# Patient Record
Sex: FEMALE | Race: WHITE | NOT HISPANIC OR LATINO | Employment: OTHER | URBAN - METROPOLITAN AREA
[De-identification: names, ages, dates, MRNs, and addresses within clinical notes are randomized per-mention and may not be internally consistent; named-entity substitution may affect disease eponyms.]

---

## 2019-01-01 ENCOUNTER — APPOINTMENT (OUTPATIENT)
Dept: CT IMAGING | Facility: HOSPITAL | Age: 84
End: 2019-01-01

## 2019-01-01 ENCOUNTER — APPOINTMENT (OUTPATIENT)
Dept: GENERAL RADIOLOGY | Facility: HOSPITAL | Age: 84
End: 2019-01-01

## 2019-01-01 ENCOUNTER — HOSPITAL ENCOUNTER (INPATIENT)
Facility: HOSPITAL | Age: 84
LOS: 1 days | End: 2019-11-30
Attending: EMERGENCY MEDICINE | Admitting: INTERNAL MEDICINE

## 2019-01-01 ENCOUNTER — HOSPITAL ENCOUNTER (OUTPATIENT)
Facility: HOSPITAL | Age: 84
Setting detail: OBSERVATION
Discharge: NURSING, REHAB OR LONG-TERM CARE | End: 2019-03-20
Attending: INTERNAL MEDICINE | Admitting: INTERNAL MEDICINE

## 2019-01-01 ENCOUNTER — HOSPITAL ENCOUNTER (INPATIENT)
Facility: HOSPITAL | Age: 84
LOS: 7 days | Discharge: HOME | End: 2019-06-14
Attending: EMERGENCY MEDICINE | Admitting: INTERNAL MEDICINE

## 2019-01-01 ENCOUNTER — HOSPITAL ENCOUNTER (OUTPATIENT)
Dept: CT IMAGING | Facility: HOSPITAL | Age: 84
Discharge: HOME | End: 2019-09-17
Admitting: INTERNAL MEDICINE

## 2019-01-01 ENCOUNTER — APPOINTMENT (OUTPATIENT)
Dept: CARDIOLOGY | Facility: HOSPITAL | Age: 84
End: 2019-01-01

## 2019-01-01 ENCOUNTER — TRANSCRIBE ORDERS (OUTPATIENT)
Dept: ADMINISTRATIVE | Facility: HOSPITAL | Age: 84
End: 2019-01-01

## 2019-01-01 VITALS
HEIGHT: 59 IN | TEMPERATURE: 98.3 F | SYSTOLIC BLOOD PRESSURE: 151 MMHG | BODY MASS INDEX: 20.18 KG/M2 | DIASTOLIC BLOOD PRESSURE: 58 MMHG | OXYGEN SATURATION: 89 % | RESPIRATION RATE: 18 BRPM | HEART RATE: 86 BPM | WEIGHT: 100.09 LBS

## 2019-01-01 VITALS
HEART RATE: 85 BPM | DIASTOLIC BLOOD PRESSURE: 63 MMHG | RESPIRATION RATE: 36 BRPM | HEIGHT: 60 IN | SYSTOLIC BLOOD PRESSURE: 118 MMHG | WEIGHT: 95 LBS | BODY MASS INDEX: 18.65 KG/M2 | OXYGEN SATURATION: 91 % | TEMPERATURE: 97.8 F

## 2019-01-01 VITALS
WEIGHT: 93.6 LBS | SYSTOLIC BLOOD PRESSURE: 133 MMHG | BODY MASS INDEX: 19.65 KG/M2 | HEIGHT: 58 IN | DIASTOLIC BLOOD PRESSURE: 68 MMHG | RESPIRATION RATE: 16 BRPM | HEART RATE: 73 BPM | OXYGEN SATURATION: 95 % | TEMPERATURE: 98.7 F

## 2019-01-01 DIAGNOSIS — R91.8 LUNG MASS: ICD-10-CM

## 2019-01-01 DIAGNOSIS — I50.9 CONGESTIVE HEART FAILURE, UNSPECIFIED HF CHRONICITY, UNSPECIFIED HEART FAILURE TYPE (HCC): Primary | ICD-10-CM

## 2019-01-01 DIAGNOSIS — J18.9 PNEUMONIA OF RIGHT MIDDLE LOBE DUE TO INFECTIOUS ORGANISM: ICD-10-CM

## 2019-01-01 DIAGNOSIS — R56.9 NEW ONSET SEIZURE (HCC): ICD-10-CM

## 2019-01-01 DIAGNOSIS — D64.9 SEVERE ANEMIA: ICD-10-CM

## 2019-01-01 DIAGNOSIS — R13.10 DYSPHAGIA, UNSPECIFIED TYPE: ICD-10-CM

## 2019-01-01 DIAGNOSIS — Z51.5 END OF LIFE CARE: Primary | ICD-10-CM

## 2019-01-01 DIAGNOSIS — J90 PLEURAL EFFUSION ON RIGHT: ICD-10-CM

## 2019-01-01 DIAGNOSIS — R09.02 HYPOXIA: ICD-10-CM

## 2019-01-01 DIAGNOSIS — N17.9 ACUTE RENAL FAILURE, UNSPECIFIED ACUTE RENAL FAILURE TYPE (HCC): ICD-10-CM

## 2019-01-01 DIAGNOSIS — R91.8 LUNG MASS: Primary | ICD-10-CM

## 2019-01-01 DIAGNOSIS — Z74.09 IMPAIRED FUNCTIONAL MOBILITY, BALANCE, GAIT, AND ENDURANCE: Primary | ICD-10-CM

## 2019-01-01 DIAGNOSIS — Z74.09 IMPAIRED FUNCTIONAL MOBILITY, BALANCE, GAIT, AND ENDURANCE: ICD-10-CM

## 2019-01-01 DIAGNOSIS — J90 PLEURAL EFFUSION: ICD-10-CM

## 2019-01-01 DIAGNOSIS — Z78.9 IMPAIRED MOBILITY AND ADLS: ICD-10-CM

## 2019-01-01 DIAGNOSIS — Z74.09 IMPAIRED MOBILITY AND ADLS: ICD-10-CM

## 2019-01-01 LAB
ALBUMIN SERPL-MCNC: 3.3 G/DL (ref 3.5–5.2)
ALBUMIN SERPL-MCNC: 3.5 G/DL (ref 3.5–5.2)
ALBUMIN SERPL-MCNC: 3.7 G/DL (ref 3.5–5.2)
ALBUMIN SERPL-MCNC: 3.7 G/DL (ref 3.5–5.2)
ALBUMIN SERPL-MCNC: 4.2 G/DL (ref 3.5–5.2)
ALBUMIN/GLOB SERPL: 1.8 G/DL
ALBUMIN/GLOB SERPL: 1.8 G/DL
ALBUMIN/GLOB SERPL: 1.9 G/DL
ALBUMIN/GLOB SERPL: 2.1 G/DL
ALBUMIN/GLOB SERPL: 2.5 G/DL
ALP SERPL-CCNC: 102 U/L (ref 39–117)
ALP SERPL-CCNC: 125 U/L (ref 39–117)
ALP SERPL-CCNC: 84 U/L (ref 39–117)
ALP SERPL-CCNC: 89 U/L (ref 39–117)
ALP SERPL-CCNC: 96 U/L (ref 39–117)
ALT SERPL W P-5'-P-CCNC: 20 U/L (ref 1–33)
ALT SERPL W P-5'-P-CCNC: 7 U/L (ref 1–33)
ALT SERPL W P-5'-P-CCNC: 8 U/L (ref 1–33)
ANION GAP SERPL CALCULATED.3IONS-SCNC: 10 MMOL/L
ANION GAP SERPL CALCULATED.3IONS-SCNC: 10.1 MMOL/L
ANION GAP SERPL CALCULATED.3IONS-SCNC: 11 MMOL/L
ANION GAP SERPL CALCULATED.3IONS-SCNC: 12 MMOL/L
ANION GAP SERPL CALCULATED.3IONS-SCNC: 12.3 MMOL/L
ANION GAP SERPL CALCULATED.3IONS-SCNC: 13 MMOL/L
ANION GAP SERPL CALCULATED.3IONS-SCNC: 13 MMOL/L
ANION GAP SERPL CALCULATED.3IONS-SCNC: 24 MMOL/L (ref 5–15)
ANION GAP SERPL CALCULATED.3IONS-SCNC: 7 MMOL/L
ANION GAP SERPL CALCULATED.3IONS-SCNC: 9.3 MMOL/L
APPEARANCE FLD: ABNORMAL
APTT PPP: 29.2 SECONDS (ref 24–37)
ASCENDING AORTA: 2.8 CM
AST SERPL-CCNC: 14 U/L (ref 1–32)
AST SERPL-CCNC: 14 U/L (ref 1–32)
AST SERPL-CCNC: 15 U/L (ref 1–32)
AST SERPL-CCNC: 15 U/L (ref 1–32)
AST SERPL-CCNC: 28 U/L (ref 1–32)
BACTERIA BLD CULT: ABNORMAL
BACTERIA FLD CULT: NORMAL
BACTERIA SPEC AEROBE CULT: ABNORMAL
BACTERIA SPEC AEROBE CULT: NORMAL
BACTERIA SPEC ANAEROBE CULT: NORMAL
BASOPHILS # BLD AUTO: 0.02 10*3/MM3 (ref 0–0.2)
BASOPHILS # BLD AUTO: 0.02 10*3/MM3 (ref 0–0.2)
BASOPHILS # BLD AUTO: 0.04 10*3/MM3 (ref 0–0.2)
BASOPHILS # BLD AUTO: 0.06 10*3/MM3 (ref 0–0.2)
BASOPHILS NFR BLD AUTO: 0.2 % (ref 0–1.5)
BASOPHILS NFR BLD AUTO: 0.2 % (ref 0–1.5)
BASOPHILS NFR BLD AUTO: 0.4 % (ref 0–1.5)
BASOPHILS NFR BLD AUTO: 0.7 % (ref 0–1.5)
BH CV ECHO MEAS - ACS: 1.2 CM
BH CV ECHO MEAS - AI DEC SLOPE: 125 CM/SEC^2
BH CV ECHO MEAS - AI MAX PG: 20.3 MMHG
BH CV ECHO MEAS - AI MAX VEL: 225 CM/SEC
BH CV ECHO MEAS - AI P1/2T: 527.2 MSEC
BH CV ECHO MEAS - AO MAX PG (FULL): 12.7 MMHG
BH CV ECHO MEAS - AO MAX PG: 15.4 MMHG
BH CV ECHO MEAS - AO MEAN PG (FULL): 6 MMHG
BH CV ECHO MEAS - AO MEAN PG: 7 MMHG
BH CV ECHO MEAS - AO ROOT AREA (BSA CORRECTED): 2.1
BH CV ECHO MEAS - AO ROOT AREA: 6.6 CM^2
BH CV ECHO MEAS - AO ROOT DIAM: 2.9 CM
BH CV ECHO MEAS - AO V2 MAX: 196 CM/SEC
BH CV ECHO MEAS - AO V2 MEAN: 123 CM/SEC
BH CV ECHO MEAS - AO V2 VTI: 38.4 CM
BH CV ECHO MEAS - AVA(I,A): 1.2 CM^2
BH CV ECHO MEAS - AVA(I,D): 1.2 CM^2
BH CV ECHO MEAS - AVA(V,A): 1.3 CM^2
BH CV ECHO MEAS - AVA(V,D): 1.3 CM^2
BH CV ECHO MEAS - BSA(HAYCOCK): 1.4 M^2
BH CV ECHO MEAS - BSA: 1.4 M^2
BH CV ECHO MEAS - BZI_BMI: 20.2 KILOGRAMS/M^2
BH CV ECHO MEAS - BZI_METRIC_HEIGHT: 149.9 CM
BH CV ECHO MEAS - BZI_METRIC_WEIGHT: 45.4 KG
BH CV ECHO MEAS - EDV(MOD-SP2): 111 ML
BH CV ECHO MEAS - EDV(MOD-SP4): 99 ML
BH CV ECHO MEAS - EDV(TEICH): 75.5 ML
BH CV ECHO MEAS - EF(CUBED): 49.4 %
BH CV ECHO MEAS - EF(MOD-BP): 32 %
BH CV ECHO MEAS - EF(MOD-SP2): 31.5 %
BH CV ECHO MEAS - EF(MOD-SP4): 30.3 %
BH CV ECHO MEAS - EF(TEICH): 42 %
BH CV ECHO MEAS - ESV(MOD-SP2): 76 ML
BH CV ECHO MEAS - ESV(MOD-SP4): 69 ML
BH CV ECHO MEAS - ESV(TEICH): 43.8 ML
BH CV ECHO MEAS - FS: 20.3 %
BH CV ECHO MEAS - IVS/LVPW: 1
BH CV ECHO MEAS - IVSD: 0.83 CM
BH CV ECHO MEAS - LAT PEAK E' VEL: 11 CM/SEC
BH CV ECHO MEAS - LV DIASTOLIC VOL/BSA (35-75): 72.1 ML/M^2
BH CV ECHO MEAS - LV MASS(C)D: 101.1 GRAMS
BH CV ECHO MEAS - LV MASS(C)DI: 73.6 GRAMS/M^2
BH CV ECHO MEAS - LV MAX PG: 2.7 MMHG
BH CV ECHO MEAS - LV MEAN PG: 1 MMHG
BH CV ECHO MEAS - LV SYSTOLIC VOL/BSA (12-30): 50.2 ML/M^2
BH CV ECHO MEAS - LV V1 MAX: 82.2 CM/SEC
BH CV ECHO MEAS - LV V1 MEAN: 46.1 CM/SEC
BH CV ECHO MEAS - LV V1 VTI: 15 CM
BH CV ECHO MEAS - LVIDD: 4.1 CM
BH CV ECHO MEAS - LVIDS: 3.3 CM
BH CV ECHO MEAS - LVLD AP2: 7.6 CM
BH CV ECHO MEAS - LVLD AP4: 8.2 CM
BH CV ECHO MEAS - LVLS AP2: 7.4 CM
BH CV ECHO MEAS - LVLS AP4: 7.4 CM
BH CV ECHO MEAS - LVOT AREA (M): 3.1 CM^2
BH CV ECHO MEAS - LVOT AREA: 3.1 CM^2
BH CV ECHO MEAS - LVOT DIAM: 2 CM
BH CV ECHO MEAS - LVPWD: 0.8 CM
BH CV ECHO MEAS - MED PEAK E' VEL: 3 CM/SEC
BH CV ECHO MEAS - MR MAX PG: 94.1 MMHG
BH CV ECHO MEAS - MR MAX VEL: 485 CM/SEC
BH CV ECHO MEAS - MV A DUR: 0.13 SEC
BH CV ECHO MEAS - MV A MAX VEL: 187 CM/SEC
BH CV ECHO MEAS - MV DEC SLOPE: 939 CM/SEC^2
BH CV ECHO MEAS - MV DEC TIME: 0.17 SEC
BH CV ECHO MEAS - MV E MAX VEL: 137 CM/SEC
BH CV ECHO MEAS - MV E/A: 0.73
BH CV ECHO MEAS - MV MAX PG: 16.3 MMHG
BH CV ECHO MEAS - MV MEAN PG: 7 MMHG
BH CV ECHO MEAS - MV P1/2T MAX VEL: 140 CM/SEC
BH CV ECHO MEAS - MV P1/2T: 43.7 MSEC
BH CV ECHO MEAS - MV V2 MAX: 202 CM/SEC
BH CV ECHO MEAS - MV V2 MEAN: 122 CM/SEC
BH CV ECHO MEAS - MV V2 VTI: 41.6 CM
BH CV ECHO MEAS - MVA P1/2T LCG: 1.6 CM^2
BH CV ECHO MEAS - MVA(P1/2T): 5 CM^2
BH CV ECHO MEAS - MVA(VTI): 1.1 CM^2
BH CV ECHO MEAS - PA ACC TIME: 0.1 SEC
BH CV ECHO MEAS - PA MAX PG (FULL): 2.6 MMHG
BH CV ECHO MEAS - PA MAX PG: 5 MMHG
BH CV ECHO MEAS - PA PR(ACCEL): 34 MMHG
BH CV ECHO MEAS - PA V2 MAX: 112 CM/SEC
BH CV ECHO MEAS - PULM A REVS DUR: 0.12 SEC
BH CV ECHO MEAS - PULM A REVS VEL: 21.6 CM/SEC
BH CV ECHO MEAS - PULM DIAS VEL: 24.2 CM/SEC
BH CV ECHO MEAS - PULM S/D: 1.8
BH CV ECHO MEAS - PULM SYS VEL: 44.7 CM/SEC
BH CV ECHO MEAS - RAP SYSTOLE: 8 MMHG
BH CV ECHO MEAS - RV MAX PG: 2.5 MMHG
BH CV ECHO MEAS - RV MEAN PG: 2 MMHG
BH CV ECHO MEAS - RV V1 MAX: 78.3 CM/SEC
BH CV ECHO MEAS - RV V1 MEAN: 58.9 CM/SEC
BH CV ECHO MEAS - RV V1 VTI: 16.3 CM
BH CV ECHO MEAS - RVSP: 44 MMHG
BH CV ECHO MEAS - SI(AO): 184.7 ML/M^2
BH CV ECHO MEAS - SI(CUBED): 25.4 ML/M^2
BH CV ECHO MEAS - SI(LVOT): 34.3 ML/M^2
BH CV ECHO MEAS - SI(MOD-SP2): 25.5 ML/M^2
BH CV ECHO MEAS - SI(MOD-SP4): 21.8 ML/M^2
BH CV ECHO MEAS - SI(TEICH): 23.1 ML/M^2
BH CV ECHO MEAS - SV(AO): 253.6 ML
BH CV ECHO MEAS - SV(CUBED): 34.8 ML
BH CV ECHO MEAS - SV(LVOT): 47.1 ML
BH CV ECHO MEAS - SV(MOD-SP2): 35 ML
BH CV ECHO MEAS - SV(MOD-SP4): 30 ML
BH CV ECHO MEAS - SV(TEICH): 31.7 ML
BH CV ECHO MEAS - TAPSE (>1.6): 1.9 CM2
BH CV ECHO MEAS - TR MAX VEL: 299 CM/SEC
BH CV ECHO MEASUREMENTS AVERAGE E/E' RATIO: 19.57
BH CV XLRA - RV BASE: 3 CM
BH CV XLRA - TDI S': 16 CM/SEC
BILIRUB SERPL-MCNC: 0.3 MG/DL (ref 0.1–1.2)
BILIRUB SERPL-MCNC: 0.4 MG/DL (ref 0.2–1.2)
BILIRUB SERPL-MCNC: 0.6 MG/DL (ref 0.2–1.2)
BILIRUB UR QL STRIP: NEGATIVE
BUN BLD-MCNC: 11 MG/DL (ref 8–23)
BUN BLD-MCNC: 11 MG/DL (ref 8–23)
BUN BLD-MCNC: 13 MG/DL (ref 8–23)
BUN BLD-MCNC: 15 MG/DL (ref 8–23)
BUN BLD-MCNC: 16 MG/DL (ref 8–23)
BUN BLD-MCNC: 31 MG/DL (ref 8–23)
BUN BLD-MCNC: 9 MG/DL (ref 8–23)
BUN/CREAT SERPL: 19.7 (ref 7–25)
BUN/CREAT SERPL: 21.7 (ref 7–25)
BUN/CREAT SERPL: 22 (ref 7–25)
BUN/CREAT SERPL: 24.3 (ref 7–25)
BUN/CREAT SERPL: 26 (ref 7–25)
BUN/CREAT SERPL: 27 (ref 7–25)
BUN/CREAT SERPL: 27.5 (ref 7–25)
BUN/CREAT SERPL: 31 (ref 7–25)
BUN/CREAT SERPL: 32.6 (ref 7–25)
BUN/CREAT SERPL: 43.2 (ref 7–25)
CALCIUM SPEC-SCNC: 8.3 MG/DL (ref 8.2–9.6)
CALCIUM SPEC-SCNC: 8.4 MG/DL (ref 8.2–9.6)
CALCIUM SPEC-SCNC: 8.5 MG/DL (ref 8.2–9.6)
CALCIUM SPEC-SCNC: 8.6 MG/DL (ref 8.2–9.6)
CALCIUM SPEC-SCNC: 8.8 MG/DL (ref 8.2–9.6)
CALCIUM SPEC-SCNC: 9 MG/DL (ref 8.2–9.6)
CALCIUM SPEC-SCNC: 9.2 MG/DL (ref 8.2–9.6)
CALCIUM SPEC-SCNC: 9.4 MG/DL (ref 8.2–9.6)
CHLORIDE SERPL-SCNC: 90 MMOL/L (ref 98–107)
CHLORIDE SERPL-SCNC: 92 MMOL/L (ref 98–107)
CHLORIDE SERPL-SCNC: 93 MMOL/L (ref 98–107)
CHLORIDE SERPL-SCNC: 93 MMOL/L (ref 98–107)
CHLORIDE SERPL-SCNC: 94 MMOL/L (ref 98–107)
CHLORIDE SERPL-SCNC: 95 MMOL/L (ref 98–107)
CHLORIDE SERPL-SCNC: 96 MMOL/L (ref 98–107)
CLARITY UR: ABNORMAL
CO2 SERPL-SCNC: 19 MMOL/L (ref 22–29)
CO2 SERPL-SCNC: 24 MMOL/L (ref 22–29)
CO2 SERPL-SCNC: 26.7 MMOL/L (ref 22–29)
CO2 SERPL-SCNC: 27 MMOL/L (ref 22–29)
CO2 SERPL-SCNC: 27.7 MMOL/L (ref 22–29)
CO2 SERPL-SCNC: 28 MMOL/L (ref 22–29)
CO2 SERPL-SCNC: 29.9 MMOL/L (ref 22–29)
CO2 SERPL-SCNC: 31 MMOL/L (ref 22–29)
CO2 SERPL-SCNC: 32 MMOL/L (ref 22–29)
CO2 SERPL-SCNC: 37 MMOL/L (ref 22–29)
COLOR FLD: YELLOW
COLOR UR: YELLOW
CREAT BLD-MCNC: 0.37 MG/DL (ref 0.57–1)
CREAT BLD-MCNC: 0.37 MG/DL (ref 0.57–1)
CREAT BLD-MCNC: 0.4 MG/DL (ref 0.57–1)
CREAT BLD-MCNC: 0.42 MG/DL (ref 0.57–1)
CREAT BLD-MCNC: 0.46 MG/DL (ref 0.57–1)
CREAT BLD-MCNC: 0.5 MG/DL (ref 0.57–1)
CREAT BLD-MCNC: 0.5 MG/DL (ref 0.57–1)
CREAT BLD-MCNC: 0.6 MG/DL (ref 0.57–1)
CREAT BLD-MCNC: 0.66 MG/DL (ref 0.57–1)
CREAT BLD-MCNC: 1.15 MG/DL (ref 0.57–1)
DEPRECATED RDW RBC AUTO: 44.1 FL (ref 37–54)
DEPRECATED RDW RBC AUTO: 44.5 FL (ref 37–54)
DEPRECATED RDW RBC AUTO: 44.6 FL (ref 37–54)
DEPRECATED RDW RBC AUTO: 45.1 FL (ref 37–54)
DEPRECATED RDW RBC AUTO: 45.4 FL (ref 37–54)
DEPRECATED RDW RBC AUTO: 57.1 FL (ref 37–54)
EOSINOPHIL # BLD AUTO: 0 10*3/MM3 (ref 0–0.4)
EOSINOPHIL # BLD AUTO: 0.11 10*3/MM3 (ref 0–0.4)
EOSINOPHIL NFR BLD AUTO: 0 % (ref 0.3–6.2)
EOSINOPHIL NFR BLD AUTO: 1.1 % (ref 0.3–6.2)
ERYTHROCYTE [DISTWIDTH] IN BLOOD BY AUTOMATED COUNT: 12.6 % (ref 12.3–15.4)
ERYTHROCYTE [DISTWIDTH] IN BLOOD BY AUTOMATED COUNT: 13.2 % (ref 12.3–15.4)
ERYTHROCYTE [DISTWIDTH] IN BLOOD BY AUTOMATED COUNT: 15.9 % (ref 12.3–15.4)
GFR SERPL CREATININE-BSD FRML MDRD: 116 ML/MIN/1.73
GFR SERPL CREATININE-BSD FRML MDRD: 116 ML/MIN/1.73
GFR SERPL CREATININE-BSD FRML MDRD: 127 ML/MIN/1.73
GFR SERPL CREATININE-BSD FRML MDRD: 141 ML/MIN/1.73
GFR SERPL CREATININE-BSD FRML MDRD: 150 ML/MIN/1.73
GFR SERPL CREATININE-BSD FRML MDRD: 44 ML/MIN/1.73
GFR SERPL CREATININE-BSD FRML MDRD: 84 ML/MIN/1.73
GFR SERPL CREATININE-BSD FRML MDRD: 94 ML/MIN/1.73
GFR SERPL CREATININE-BSD FRML MDRD: >150 ML/MIN/1.73
GFR SERPL CREATININE-BSD FRML MDRD: >150 ML/MIN/1.73
GLOBULIN UR ELPH-MCNC: 1.5 GM/DL
GLOBULIN UR ELPH-MCNC: 1.7 GM/DL
GLOBULIN UR ELPH-MCNC: 1.8 GM/DL
GLOBULIN UR ELPH-MCNC: 2 GM/DL
GLOBULIN UR ELPH-MCNC: 2.4 GM/DL
GLUCOSE BLD-MCNC: 103 MG/DL (ref 65–99)
GLUCOSE BLD-MCNC: 118 MG/DL (ref 65–99)
GLUCOSE BLD-MCNC: 271 MG/DL (ref 65–99)
GLUCOSE BLD-MCNC: 88 MG/DL (ref 65–99)
GLUCOSE BLD-MCNC: 91 MG/DL (ref 65–99)
GLUCOSE BLD-MCNC: 91 MG/DL (ref 65–99)
GLUCOSE BLD-MCNC: 92 MG/DL (ref 65–99)
GLUCOSE BLD-MCNC: 96 MG/DL (ref 65–99)
GLUCOSE BLD-MCNC: 98 MG/DL (ref 65–99)
GLUCOSE BLD-MCNC: 98 MG/DL (ref 65–99)
GLUCOSE BLDC GLUCOMTR-MCNC: 148 MG/DL (ref 70–130)
GLUCOSE FLD-MCNC: 101 MG/DL
GLUCOSE UR STRIP-MCNC: NEGATIVE MG/DL
GRAM STN SPEC: ABNORMAL
GRAM STN SPEC: NORMAL
GRAM STN SPEC: NORMAL
HCT VFR BLD AUTO: 19.3 % (ref 34–46.6)
HCT VFR BLD AUTO: 35 % (ref 34–46.6)
HCT VFR BLD AUTO: 36 % (ref 34–46.6)
HCT VFR BLD AUTO: 38.2 % (ref 34–46.6)
HCT VFR BLD AUTO: 40.1 % (ref 34–46.6)
HCT VFR BLD AUTO: 41.8 % (ref 34–46.6)
HGB BLD-MCNC: 11.3 G/DL (ref 12–15.9)
HGB BLD-MCNC: 12.2 G/DL (ref 12–15.9)
HGB BLD-MCNC: 12.8 G/DL (ref 12–15.9)
HGB BLD-MCNC: 13.2 G/DL (ref 12–15.9)
HGB BLD-MCNC: 13.6 G/DL (ref 12–15.9)
HGB BLD-MCNC: 6.1 G/DL (ref 12–15.9)
HGB UR QL STRIP.AUTO: NEGATIVE
HOLD SPECIMEN: NORMAL
IMM GRANULOCYTES # BLD AUTO: 0.02 10*3/MM3 (ref 0–0.05)
IMM GRANULOCYTES # BLD AUTO: 0.02 10*3/MM3 (ref 0–0.05)
IMM GRANULOCYTES # BLD AUTO: 0.03 10*3/MM3 (ref 0–0.05)
IMM GRANULOCYTES # BLD AUTO: 0.09 10*3/MM3 (ref 0–0.05)
IMM GRANULOCYTES NFR BLD AUTO: 0.2 % (ref 0–0.5)
IMM GRANULOCYTES NFR BLD AUTO: 0.2 % (ref 0–0.5)
IMM GRANULOCYTES NFR BLD AUTO: 0.3 % (ref 0–0.5)
IMM GRANULOCYTES NFR BLD AUTO: 0.7 % (ref 0–0.5)
INR PPP: 1.06 (ref 0.85–1.16)
INR PPP: 1.1 (ref 0.85–1.16)
ISOLATED FROM: ABNORMAL
KETONES UR QL STRIP: NEGATIVE
LAB AP CASE REPORT: NORMAL
LAB AP FLOW CYTOMETRY SUMMARY: NORMAL
LDH FLD-CCNC: 126 U/L
LDH SERPL-CCNC: 227 U/L (ref 135–214)
LDH SERPL-CCNC: 230 U/L (ref 135–214)
LEFT ATRIUM VOLUME INDEX: 43 ML/M2
LEUKOCYTE ESTERASE UR QL STRIP.AUTO: NEGATIVE
LV EF 2D ECHO EST: 32 %
LYMPHOCYTES # BLD AUTO: 2.34 10*3/MM3 (ref 0.7–3.1)
LYMPHOCYTES # BLD AUTO: 2.86 10*3/MM3 (ref 0.7–3.1)
LYMPHOCYTES # BLD AUTO: 2.88 10*3/MM3 (ref 0.7–3.1)
LYMPHOCYTES # BLD AUTO: 3.22 10*3/MM3 (ref 0.7–3.1)
LYMPHOCYTES NFR BLD AUTO: 17.8 % (ref 19.6–45.3)
LYMPHOCYTES NFR BLD AUTO: 32.6 % (ref 19.6–45.3)
LYMPHOCYTES NFR BLD AUTO: 33.1 % (ref 19.6–45.3)
LYMPHOCYTES NFR BLD AUTO: 36 % (ref 19.6–45.3)
LYMPHOCYTES NFR FLD MANUAL: 77 %
MACROPHAGE FLUID: 2 %
MAGNESIUM SERPL-MCNC: 1.8 MG/DL (ref 1.7–2.3)
MAGNESIUM SERPL-MCNC: 2 MG/DL (ref 1.7–2.3)
MAGNESIUM SERPL-MCNC: 2.2 MG/DL (ref 1.7–2.3)
MCH RBC QN AUTO: 30.2 PG (ref 26.6–33)
MCH RBC QN AUTO: 30.3 PG (ref 26.6–33)
MCH RBC QN AUTO: 30.4 PG (ref 26.6–33)
MCH RBC QN AUTO: 30.8 PG (ref 26.6–33)
MCH RBC QN AUTO: 31.1 PG (ref 26.6–33)
MCH RBC QN AUTO: 31.2 PG (ref 26.6–33)
MCHC RBC AUTO-ENTMCNC: 31.6 G/DL (ref 31.5–35.7)
MCHC RBC AUTO-ENTMCNC: 32.3 G/DL (ref 31.5–35.7)
MCHC RBC AUTO-ENTMCNC: 32.5 G/DL (ref 31.5–35.7)
MCHC RBC AUTO-ENTMCNC: 32.9 G/DL (ref 31.5–35.7)
MCHC RBC AUTO-ENTMCNC: 33.5 G/DL (ref 31.5–35.7)
MCHC RBC AUTO-ENTMCNC: 33.9 G/DL (ref 31.5–35.7)
MCV RBC AUTO: 90.7 FL (ref 79–97)
MCV RBC AUTO: 90.9 FL (ref 79–97)
MCV RBC AUTO: 91.8 FL (ref 79–97)
MCV RBC AUTO: 93.1 FL (ref 79–97)
MCV RBC AUTO: 96.7 FL (ref 79–97)
MCV RBC AUTO: 98.5 FL (ref 79–97)
MESOTHL CELL NFR FLD MANUAL: 4 %
MONOCYTES # BLD AUTO: 0.47 10*3/MM3 (ref 0.1–0.9)
MONOCYTES # BLD AUTO: 0.65 10*3/MM3 (ref 0.1–0.9)
MONOCYTES # BLD AUTO: 0.81 10*3/MM3 (ref 0.1–0.9)
MONOCYTES # BLD AUTO: 0.81 10*3/MM3 (ref 0.1–0.9)
MONOCYTES NFR BLD AUTO: 3.6 % (ref 5–12)
MONOCYTES NFR BLD AUTO: 8.1 % (ref 5–12)
MONOCYTES NFR BLD AUTO: 8.2 % (ref 5–12)
MONOCYTES NFR BLD AUTO: 9.4 % (ref 5–12)
MONOCYTES NFR FLD: 3 %
NEUTROPHILS # BLD AUTO: 10.26 10*3/MM3 (ref 1.7–7)
NEUTROPHILS # BLD AUTO: 4.44 10*3/MM3 (ref 1.4–7)
NEUTROPHILS # BLD AUTO: 4.87 10*3/MM3 (ref 1.7–7)
NEUTROPHILS # BLD AUTO: 5.68 10*3/MM3 (ref 1.7–7)
NEUTROPHILS NFR BLD AUTO: 55.5 % (ref 42.7–76)
NEUTROPHILS NFR BLD AUTO: 56.5 % (ref 42.7–76)
NEUTROPHILS NFR BLD AUTO: 57.5 % (ref 42.7–76)
NEUTROPHILS NFR BLD AUTO: 77.7 % (ref 42.7–76)
NEUTROPHILS NFR FLD MANUAL: 14 %
NITRITE UR QL STRIP: NEGATIVE
NRBC BLD AUTO-RTO: 0 /100 WBC (ref 0–0)
NRBC BLD AUTO-RTO: 0 /100 WBC (ref 0–0.2)
NT-PROBNP SERPL-MCNC: 5443 PG/ML (ref 5–1800)
NT-PROBNP SERPL-MCNC: ABNORMAL PG/ML (ref 5–1800)
NT-PROBNP SERPL-MCNC: ABNORMAL PG/ML (ref 5–1800)
PATH REPORT.FINAL DX SPEC: NORMAL
PH FLD: 7.37 [PH]
PH UR STRIP.AUTO: 8.5 [PH] (ref 5–8)
PHOSPHATE SERPL-MCNC: 3.1 MG/DL (ref 2.5–4.5)
PHOSPHATE SERPL-MCNC: 3.6 MG/DL (ref 2.5–4.5)
PLATELET # BLD AUTO: 195 10*3/MM3 (ref 140–450)
PLATELET # BLD AUTO: 252 10*3/MM3 (ref 140–450)
PLATELET # BLD AUTO: 294 10*3/MM3 (ref 140–450)
PLATELET # BLD AUTO: 298 10*3/MM3 (ref 140–450)
PLATELET # BLD AUTO: 299 10*3/MM3 (ref 140–450)
PLATELET # BLD AUTO: 356 10*3/MM3 (ref 140–450)
PMV BLD AUTO: 10.2 FL (ref 6–12)
PMV BLD AUTO: 10.2 FL (ref 6–12)
PMV BLD AUTO: 11.3 FL (ref 6–12)
PMV BLD AUTO: 9.8 FL (ref 6–12)
PMV BLD AUTO: 9.8 FL (ref 6–12)
PMV BLD AUTO: 9.9 FL (ref 6–12)
POTASSIUM BLD-SCNC: 2.8 MMOL/L (ref 3.5–5.2)
POTASSIUM BLD-SCNC: 3.3 MMOL/L (ref 3.5–5.2)
POTASSIUM BLD-SCNC: 3.5 MMOL/L (ref 3.5–5.2)
POTASSIUM BLD-SCNC: 3.5 MMOL/L (ref 3.5–5.2)
POTASSIUM BLD-SCNC: 3.6 MMOL/L (ref 3.5–5.2)
POTASSIUM BLD-SCNC: 4 MMOL/L (ref 3.5–5.2)
POTASSIUM BLD-SCNC: 4 MMOL/L (ref 3.5–5.2)
POTASSIUM BLD-SCNC: 4.2 MMOL/L (ref 3.5–5.2)
POTASSIUM BLD-SCNC: 4.4 MMOL/L (ref 3.5–5.2)
POTASSIUM BLD-SCNC: 4.4 MMOL/L (ref 3.5–5.2)
POTASSIUM BLD-SCNC: 4.5 MMOL/L (ref 3.5–5.2)
POTASSIUM BLD-SCNC: 4.9 MMOL/L (ref 3.5–5.2)
PROT FLD-MCNC: 3 G/DL
PROT SERPL-MCNC: 5 G/DL (ref 6–8.5)
PROT SERPL-MCNC: 5.2 G/DL (ref 6–8.5)
PROT SERPL-MCNC: 5.5 G/DL (ref 6–8.5)
PROT SERPL-MCNC: 5.5 G/DL (ref 6–8.5)
PROT SERPL-MCNC: 6.6 G/DL (ref 6–8.5)
PROT UR QL STRIP: NEGATIVE
PROTHROMBIN TIME: 13.2 SECONDS (ref 11.2–14.3)
PROTHROMBIN TIME: 13.7 SECONDS (ref 11.2–14.3)
RBC # BLD AUTO: 1.96 10*6/MM3 (ref 3.77–5.28)
RBC # BLD AUTO: 3.62 10*6/MM3 (ref 3.77–5.28)
RBC # BLD AUTO: 3.96 10*6/MM3 (ref 3.77–5.28)
RBC # BLD AUTO: 4.21 10*6/MM3 (ref 3.77–5.28)
RBC # BLD AUTO: 4.37 10*6/MM3 (ref 3.77–5.28)
RBC # BLD AUTO: 4.49 10*6/MM3 (ref 3.77–5.28)
RBC # FLD AUTO: <2000 /MM3
SINUS: 3 CM
SODIUM BLD-SCNC: 126 MMOL/L (ref 136–145)
SODIUM BLD-SCNC: 131 MMOL/L (ref 136–145)
SODIUM BLD-SCNC: 132 MMOL/L (ref 136–145)
SODIUM BLD-SCNC: 133 MMOL/L (ref 136–145)
SODIUM BLD-SCNC: 134 MMOL/L (ref 136–145)
SODIUM BLD-SCNC: 134 MMOL/L (ref 136–145)
SODIUM BLD-SCNC: 138 MMOL/L (ref 136–145)
SP GR UR STRIP: 1.01 (ref 1–1.03)
STJ: 2 CM
T4 FREE SERPL-MCNC: 1.24 NG/DL (ref 0.93–1.7)
TROPONIN T SERPL-MCNC: 0.22 NG/ML (ref 0–0.03)
TROPONIN T SERPL-MCNC: <0.01 NG/ML (ref 0–0.03)
TSH SERPL DL<=0.05 MIU/L-ACNC: 2.86 MIU/ML (ref 0.27–4.2)
UROBILINOGEN UR QL STRIP: ABNORMAL
VIT B12 BLD-MCNC: 921 PG/ML (ref 211–946)
WBC # FLD AUTO: 514 /MM3
WBC NRBC COR # BLD: 13.18 10*3/MM3 (ref 3.4–10.8)
WBC NRBC COR # BLD: 8.01 10*3/MM3 (ref 3.4–10.8)
WBC NRBC COR # BLD: 8.63 10*3/MM3 (ref 3.4–10.8)
WBC NRBC COR # BLD: 9.09 10*3/MM3 (ref 3.4–10.8)
WBC NRBC COR # BLD: 9.1 10*3/MM3 (ref 3.4–10.8)
WBC NRBC COR # BLD: 9.88 10*3/MM3 (ref 3.4–10.8)
WHOLE BLOOD HOLD SPECIMEN: NORMAL

## 2019-01-01 PROCEDURE — 84132 ASSAY OF SERUM POTASSIUM: CPT | Performed by: INTERNAL MEDICINE

## 2019-01-01 PROCEDURE — 87070 CULTURE OTHR SPECIMN AEROBIC: CPT | Performed by: HOSPITALIST

## 2019-01-01 PROCEDURE — 87147 CULTURE TYPE IMMUNOLOGIC: CPT | Performed by: NURSE PRACTITIONER

## 2019-01-01 PROCEDURE — 25010000002 FUROSEMIDE PER 20 MG: Performed by: FAMILY MEDICINE

## 2019-01-01 PROCEDURE — 92610 EVALUATE SWALLOWING FUNCTION: CPT

## 2019-01-01 PROCEDURE — G0378 HOSPITAL OBSERVATION PER HR: HCPCS

## 2019-01-01 PROCEDURE — 83615 LACTATE (LD) (LDH) ENZYME: CPT | Performed by: HOSPITALIST

## 2019-01-01 PROCEDURE — 92611 MOTION FLUOROSCOPY/SWALLOW: CPT

## 2019-01-01 PROCEDURE — 81003 URINALYSIS AUTO W/O SCOPE: CPT | Performed by: INTERNAL MEDICINE

## 2019-01-01 PROCEDURE — 87075 CULTR BACTERIA EXCEPT BLOOD: CPT | Performed by: HOSPITALIST

## 2019-01-01 PROCEDURE — 89051 BODY FLUID CELL COUNT: CPT | Performed by: HOSPITALIST

## 2019-01-01 PROCEDURE — 83735 ASSAY OF MAGNESIUM: CPT | Performed by: INTERNAL MEDICINE

## 2019-01-01 PROCEDURE — 85027 COMPLETE CBC AUTOMATED: CPT | Performed by: HOSPITALIST

## 2019-01-01 PROCEDURE — 93306 TTE W/DOPPLER COMPLETE: CPT | Performed by: INTERNAL MEDICINE

## 2019-01-01 PROCEDURE — 93306 TTE W/DOPPLER COMPLETE: CPT

## 2019-01-01 PROCEDURE — 99223 1ST HOSP IP/OBS HIGH 75: CPT | Performed by: FAMILY MEDICINE

## 2019-01-01 PROCEDURE — C1729 CATH, DRAINAGE: HCPCS

## 2019-01-01 PROCEDURE — 85610 PROTHROMBIN TIME: CPT | Performed by: HOSPITALIST

## 2019-01-01 PROCEDURE — 80048 BASIC METABOLIC PNL TOTAL CA: CPT | Performed by: INTERNAL MEDICINE

## 2019-01-01 PROCEDURE — 97166 OT EVAL MOD COMPLEX 45 MIN: CPT

## 2019-01-01 PROCEDURE — 85025 COMPLETE CBC W/AUTO DIFF WBC: CPT | Performed by: EMERGENCY MEDICINE

## 2019-01-01 PROCEDURE — 25010000002 MIDAZOLAM PER 1 MG: Performed by: EMERGENCY MEDICINE

## 2019-01-01 PROCEDURE — 93005 ELECTROCARDIOGRAM TRACING: CPT | Performed by: EMERGENCY MEDICINE

## 2019-01-01 PROCEDURE — 85025 COMPLETE CBC W/AUTO DIFF WBC: CPT | Performed by: INTERNAL MEDICINE

## 2019-01-01 PROCEDURE — 80048 BASIC METABOLIC PNL TOTAL CA: CPT | Performed by: FAMILY MEDICINE

## 2019-01-01 PROCEDURE — 99233 SBSQ HOSP IP/OBS HIGH 50: CPT | Performed by: HOSPITALIST

## 2019-01-01 PROCEDURE — 97162 PT EVAL MOD COMPLEX 30 MIN: CPT

## 2019-01-01 PROCEDURE — 25010000002 PIPERACILLIN SOD-TAZOBACTAM PER 1 G: Performed by: FAMILY MEDICINE

## 2019-01-01 PROCEDURE — 80053 COMPREHEN METABOLIC PANEL: CPT | Performed by: HOSPITALIST

## 2019-01-01 PROCEDURE — 82945 GLUCOSE OTHER FLUID: CPT | Performed by: HOSPITALIST

## 2019-01-01 PROCEDURE — 75989 ABSCESS DRAINAGE UNDER X-RAY: CPT

## 2019-01-01 PROCEDURE — 97530 THERAPEUTIC ACTIVITIES: CPT

## 2019-01-01 PROCEDURE — 84439 ASSAY OF FREE THYROXINE: CPT | Performed by: INTERNAL MEDICINE

## 2019-01-01 PROCEDURE — 25010000003 POTASSIUM CHLORIDE 10 MEQ/100ML SOLUTION: Performed by: HOSPITALIST

## 2019-01-01 PROCEDURE — 83880 ASSAY OF NATRIURETIC PEPTIDE: CPT | Performed by: HOSPITALIST

## 2019-01-01 PROCEDURE — 0W993ZX DRAINAGE OF RIGHT PLEURAL CAVITY, PERCUTANEOUS APPROACH, DIAGNOSTIC: ICD-10-PCS | Performed by: HOSPITALIST

## 2019-01-01 PROCEDURE — 83735 ASSAY OF MAGNESIUM: CPT

## 2019-01-01 PROCEDURE — 74230 X-RAY XM SWLNG FUNCJ C+: CPT

## 2019-01-01 PROCEDURE — 25010000002 ENOXAPARIN PER 10 MG: Performed by: INTERNAL MEDICINE

## 2019-01-01 PROCEDURE — 99222 1ST HOSP IP/OBS MODERATE 55: CPT | Performed by: INTERNAL MEDICINE

## 2019-01-01 PROCEDURE — 96361 HYDRATE IV INFUSION ADD-ON: CPT

## 2019-01-01 PROCEDURE — 96372 THER/PROPH/DIAG INJ SC/IM: CPT

## 2019-01-01 PROCEDURE — 97116 GAIT TRAINING THERAPY: CPT | Performed by: PHYSICAL THERAPIST

## 2019-01-01 PROCEDURE — 99285 EMERGENCY DEPT VISIT HI MDM: CPT

## 2019-01-01 PROCEDURE — 85025 COMPLETE CBC W/AUTO DIFF WBC: CPT | Performed by: HOSPITALIST

## 2019-01-01 PROCEDURE — 99232 SBSQ HOSP IP/OBS MODERATE 35: CPT | Performed by: INTERNAL MEDICINE

## 2019-01-01 PROCEDURE — 71045 X-RAY EXAM CHEST 1 VIEW: CPT

## 2019-01-01 PROCEDURE — 25010000002 ENOXAPARIN PER 10 MG: Performed by: FAMILY MEDICINE

## 2019-01-01 PROCEDURE — 87040 BLOOD CULTURE FOR BACTERIA: CPT | Performed by: NURSE PRACTITIONER

## 2019-01-01 PROCEDURE — 84100 ASSAY OF PHOSPHORUS: CPT | Performed by: HOSPITALIST

## 2019-01-01 PROCEDURE — 25010000002 LORAZEPAM PER 2 MG: Performed by: NURSE PRACTITIONER

## 2019-01-01 PROCEDURE — 97110 THERAPEUTIC EXERCISES: CPT

## 2019-01-01 PROCEDURE — 25010000002 HYDROMORPHONE PER 4 MG: Performed by: EMERGENCY MEDICINE

## 2019-01-01 PROCEDURE — 25010000002 ENOXAPARIN PER 10 MG: Performed by: HOSPITALIST

## 2019-01-01 PROCEDURE — 99233 SBSQ HOSP IP/OBS HIGH 50: CPT | Performed by: INTERNAL MEDICINE

## 2019-01-01 PROCEDURE — 88185 FLOWCYTOMETRY/TC ADD-ON: CPT

## 2019-01-01 PROCEDURE — 93010 ELECTROCARDIOGRAM REPORT: CPT | Performed by: INTERNAL MEDICINE

## 2019-01-01 PROCEDURE — 70450 CT HEAD/BRAIN W/O DYE: CPT

## 2019-01-01 PROCEDURE — 93005 ELECTROCARDIOGRAM TRACING: CPT | Performed by: INTERNAL MEDICINE

## 2019-01-01 PROCEDURE — 99238 HOSP IP/OBS DSCHRG MGMT 30/<: CPT | Performed by: INTERNAL MEDICINE

## 2019-01-01 PROCEDURE — 84157 ASSAY OF PROTEIN OTHER: CPT | Performed by: HOSPITALIST

## 2019-01-01 PROCEDURE — 99223 1ST HOSP IP/OBS HIGH 75: CPT | Performed by: INTERNAL MEDICINE

## 2019-01-01 PROCEDURE — 85027 COMPLETE CBC AUTOMATED: CPT | Performed by: INTERNAL MEDICINE

## 2019-01-01 PROCEDURE — 84484 ASSAY OF TROPONIN QUANT: CPT | Performed by: EMERGENCY MEDICINE

## 2019-01-01 PROCEDURE — 88184 FLOWCYTOMETRY/ TC 1 MARKER: CPT

## 2019-01-01 PROCEDURE — 80053 COMPREHEN METABOLIC PANEL: CPT | Performed by: EMERGENCY MEDICINE

## 2019-01-01 PROCEDURE — 87150 DNA/RNA AMPLIFIED PROBE: CPT | Performed by: NURSE PRACTITIONER

## 2019-01-01 PROCEDURE — 88112 CYTOPATH CELL ENHANCE TECH: CPT | Performed by: HOSPITALIST

## 2019-01-01 PROCEDURE — 25010000002 PERFLUTREN (DEFINITY) 8.476 MG IN SODIUM CHLORIDE 0.9 % 10 ML INJECTION: Performed by: INTERNAL MEDICINE

## 2019-01-01 PROCEDURE — 96360 HYDRATION IV INFUSION INIT: CPT

## 2019-01-01 PROCEDURE — 84100 ASSAY OF PHOSPHORUS: CPT | Performed by: INTERNAL MEDICINE

## 2019-01-01 PROCEDURE — 97530 THERAPEUTIC ACTIVITIES: CPT | Performed by: PHYSICAL THERAPIST

## 2019-01-01 PROCEDURE — 87205 SMEAR GRAM STAIN: CPT | Performed by: HOSPITALIST

## 2019-01-01 PROCEDURE — 82962 GLUCOSE BLOOD TEST: CPT

## 2019-01-01 PROCEDURE — 84443 ASSAY THYROID STIM HORMONE: CPT | Performed by: INTERNAL MEDICINE

## 2019-01-01 PROCEDURE — 25010000002 MORPHINE PER 10 MG: Performed by: NURSE PRACTITIONER

## 2019-01-01 PROCEDURE — 83986 ASSAY PH BODY FLUID NOS: CPT | Performed by: HOSPITALIST

## 2019-01-01 PROCEDURE — 25010000002 PIPERACILLIN SOD-TAZOBACTAM PER 1 G: Performed by: NURSE PRACTITIONER

## 2019-01-01 PROCEDURE — 97162 PT EVAL MOD COMPLEX 30 MIN: CPT | Performed by: PHYSICAL THERAPIST

## 2019-01-01 PROCEDURE — 83735 ASSAY OF MAGNESIUM: CPT | Performed by: HOSPITALIST

## 2019-01-01 PROCEDURE — 83880 ASSAY OF NATRIURETIC PEPTIDE: CPT | Performed by: EMERGENCY MEDICINE

## 2019-01-01 PROCEDURE — 84132 ASSAY OF SERUM POTASSIUM: CPT | Performed by: HOSPITALIST

## 2019-01-01 PROCEDURE — 71250 CT THORAX DX C-: CPT

## 2019-01-01 PROCEDURE — 87015 SPECIMEN INFECT AGNT CONCNTJ: CPT | Performed by: HOSPITALIST

## 2019-01-01 PROCEDURE — 85730 THROMBOPLASTIN TIME PARTIAL: CPT | Performed by: HOSPITALIST

## 2019-01-01 PROCEDURE — 99239 HOSP IP/OBS DSCHRG MGMT >30: CPT | Performed by: INTERNAL MEDICINE

## 2019-01-01 PROCEDURE — 80053 COMPREHEN METABOLIC PANEL: CPT | Performed by: INTERNAL MEDICINE

## 2019-01-01 PROCEDURE — 82607 VITAMIN B-12: CPT | Performed by: INTERNAL MEDICINE

## 2019-01-01 PROCEDURE — 25010000002 FUROSEMIDE PER 20 MG: Performed by: NURSE PRACTITIONER

## 2019-01-01 RX ORDER — CHOLECALCIFEROL (VITAMIN D3) 125 MCG
5 CAPSULE ORAL NIGHTLY PRN
Status: DISCONTINUED | OUTPATIENT
Start: 2019-01-01 | End: 2019-01-01

## 2019-01-01 RX ORDER — QUETIAPINE FUMARATE 25 MG/1
12.5 TABLET, FILM COATED ORAL EVERY 12 HOURS PRN
Start: 2019-01-01 | End: 2019-01-01

## 2019-01-01 RX ORDER — LORAZEPAM 2 MG/ML
0.5 INJECTION INTRAMUSCULAR EVERY 4 HOURS PRN
Status: DISCONTINUED | OUTPATIENT
Start: 2019-01-01 | End: 2019-01-01 | Stop reason: HOSPADM

## 2019-01-01 RX ORDER — FAMOTIDINE 20 MG/1
20 TABLET, FILM COATED ORAL DAILY
Status: DISCONTINUED | OUTPATIENT
Start: 2019-01-01 | End: 2019-01-01 | Stop reason: HOSPADM

## 2019-01-01 RX ORDER — TRAMADOL HYDROCHLORIDE 50 MG/1
50 TABLET ORAL EVERY 6 HOURS PRN
Status: ON HOLD | COMMUNITY
End: 2019-01-01 | Stop reason: SDUPTHER

## 2019-01-01 RX ORDER — CHOLECALCIFEROL (VITAMIN D3) 125 MCG
5 CAPSULE ORAL NIGHTLY PRN
Status: DISCONTINUED | OUTPATIENT
Start: 2019-01-01 | End: 2019-01-01 | Stop reason: HOSPADM

## 2019-01-01 RX ORDER — ONDANSETRON 2 MG/ML
4 INJECTION INTRAMUSCULAR; INTRAVENOUS EVERY 6 HOURS PRN
Status: DISCONTINUED | OUTPATIENT
Start: 2019-01-01 | End: 2019-01-01 | Stop reason: HOSPADM

## 2019-01-01 RX ORDER — METOPROLOL SUCCINATE 25 MG/1
25 TABLET, EXTENDED RELEASE ORAL
Qty: 30 TABLET | Refills: 1 | Status: SHIPPED | OUTPATIENT
Start: 2019-01-01 | End: 2019-01-01

## 2019-01-01 RX ORDER — TRAMADOL HYDROCHLORIDE 50 MG/1
50 TABLET ORAL EVERY 6 HOURS PRN
Status: DISCONTINUED | OUTPATIENT
Start: 2019-01-01 | End: 2019-01-01 | Stop reason: HOSPADM

## 2019-01-01 RX ORDER — HYDROXYZINE HYDROCHLORIDE 10 MG/1
10 TABLET, FILM COATED ORAL NIGHTLY
Qty: 30 TABLET | Refills: 3 | Status: SHIPPED | OUTPATIENT
Start: 2019-01-01 | End: 2019-01-01

## 2019-01-01 RX ORDER — CITALOPRAM 20 MG/1
20 TABLET ORAL DAILY
Status: DISCONTINUED | OUTPATIENT
Start: 2019-01-01 | End: 2019-01-01 | Stop reason: HOSPADM

## 2019-01-01 RX ORDER — ACETAMINOPHEN 325 MG/1
650 TABLET ORAL EVERY 4 HOURS PRN
Status: DISCONTINUED | OUTPATIENT
Start: 2019-01-01 | End: 2019-01-01 | Stop reason: HOSPADM

## 2019-01-01 RX ORDER — SENNA AND DOCUSATE SODIUM 50; 8.6 MG/1; MG/1
2 TABLET, FILM COATED ORAL NIGHTLY
Status: DISCONTINUED | OUTPATIENT
Start: 2019-01-01 | End: 2019-01-01 | Stop reason: HOSPADM

## 2019-01-01 RX ORDER — SODIUM CHLORIDE 0.9 % (FLUSH) 0.9 %
3-10 SYRINGE (ML) INJECTION AS NEEDED
Status: DISCONTINUED | OUTPATIENT
Start: 2019-01-01 | End: 2019-01-01 | Stop reason: HOSPADM

## 2019-01-01 RX ORDER — MIDAZOLAM HYDROCHLORIDE 1 MG/ML
1 INJECTION INTRAMUSCULAR; INTRAVENOUS ONCE
Status: COMPLETED | OUTPATIENT
Start: 2019-01-01 | End: 2019-01-01

## 2019-01-01 RX ORDER — MIRTAZAPINE 30 MG/1
30 TABLET, FILM COATED ORAL NIGHTLY
COMMUNITY

## 2019-01-01 RX ORDER — AMLODIPINE BESYLATE 5 MG/1
5 TABLET ORAL
Status: DISCONTINUED | OUTPATIENT
Start: 2019-01-01 | End: 2019-01-01

## 2019-01-01 RX ORDER — POTASSIUM CHLORIDE 1.5 G/1.77G
40 POWDER, FOR SOLUTION ORAL AS NEEDED
Status: DISCONTINUED | OUTPATIENT
Start: 2019-01-01 | End: 2019-01-01 | Stop reason: HOSPADM

## 2019-01-01 RX ORDER — FAMOTIDINE 40 MG/1
40 TABLET, FILM COATED ORAL DAILY
Status: DISCONTINUED | OUTPATIENT
Start: 2019-01-01 | End: 2019-01-01 | Stop reason: DRUGHIGH

## 2019-01-01 RX ORDER — FUROSEMIDE 10 MG/ML
40 INJECTION INTRAMUSCULAR; INTRAVENOUS
Status: DISCONTINUED | OUTPATIENT
Start: 2019-01-01 | End: 2019-01-01

## 2019-01-01 RX ORDER — DEXAMETHASONE 0.5 MG/5ML
SOLUTION ORAL EVERY 6 HOURS PRN
COMMUNITY
End: 2019-01-01 | Stop reason: HOSPADM

## 2019-01-01 RX ORDER — LORAZEPAM 0.5 MG/1
0.5 TABLET ORAL EVERY 6 HOURS PRN
Qty: 12 TABLET | Refills: 0 | Status: SHIPPED | OUTPATIENT
Start: 2019-01-01 | End: 2019-01-01

## 2019-01-01 RX ORDER — LORAZEPAM 1 MG/1
1 TABLET ORAL EVERY 6 HOURS PRN
Qty: 30 TABLET | Refills: 0 | Status: ON HOLD | OUTPATIENT
Start: 2019-01-01 | End: 2019-01-01

## 2019-01-01 RX ORDER — POTASSIUM CHLORIDE 20 MEQ/1
20 TABLET, EXTENDED RELEASE ORAL DAILY
COMMUNITY

## 2019-01-01 RX ORDER — QUETIAPINE FUMARATE 25 MG/1
12.5 TABLET, FILM COATED ORAL EVERY 12 HOURS PRN
Status: DISCONTINUED | OUTPATIENT
Start: 2019-01-01 | End: 2019-01-01 | Stop reason: HOSPADM

## 2019-01-01 RX ORDER — HYDROMORPHONE HYDROCHLORIDE 1 MG/ML
0.25 INJECTION, SOLUTION INTRAMUSCULAR; INTRAVENOUS; SUBCUTANEOUS ONCE
Status: COMPLETED | OUTPATIENT
Start: 2019-01-01 | End: 2019-01-01

## 2019-01-01 RX ORDER — CHOLECALCIFEROL (VITAMIN D3) 125 MCG
5 CAPSULE ORAL NIGHTLY PRN
Qty: 30 TABLET | Refills: 1 | Status: SHIPPED | OUTPATIENT
Start: 2019-01-01 | End: 2019-01-01

## 2019-01-01 RX ORDER — DOXYCYCLINE HYCLATE 100 MG/1
100 CAPSULE ORAL 2 TIMES DAILY
COMMUNITY
Start: 2019-01-01 | End: 2019-01-01 | Stop reason: HOSPADM

## 2019-01-01 RX ORDER — FAMOTIDINE 20 MG/1
20 TABLET, FILM COATED ORAL DAILY
Qty: 30 TABLET | Refills: 1 | Status: SHIPPED | OUTPATIENT
Start: 2019-01-01

## 2019-01-01 RX ORDER — POTASSIUM CHLORIDE 7.45 MG/ML
10 INJECTION INTRAVENOUS
Status: DISCONTINUED | OUTPATIENT
Start: 2019-01-01 | End: 2019-01-01 | Stop reason: HOSPADM

## 2019-01-01 RX ORDER — MULTIPLE VITAMINS W/ MINERALS TAB 9MG-400MCG
1 TAB ORAL DAILY
Qty: 30 TABLET | Refills: 1 | Status: SHIPPED | OUTPATIENT
Start: 2019-01-01 | End: 2019-01-01

## 2019-01-01 RX ORDER — SODIUM CHLORIDE 0.9 % (FLUSH) 0.9 %
3 SYRINGE (ML) INJECTION EVERY 12 HOURS SCHEDULED
Status: DISCONTINUED | OUTPATIENT
Start: 2019-01-01 | End: 2019-01-01 | Stop reason: HOSPADM

## 2019-01-01 RX ORDER — ACETAMINOPHEN 325 MG/1
650 TABLET ORAL EVERY 6 HOURS PRN
COMMUNITY

## 2019-01-01 RX ORDER — SODIUM CHLORIDE 9 MG/ML
75 INJECTION, SOLUTION INTRAVENOUS CONTINUOUS
Status: DISCONTINUED | OUTPATIENT
Start: 2019-01-01 | End: 2019-01-01

## 2019-01-01 RX ORDER — MULTIPLE VITAMINS W/ MINERALS TAB 9MG-400MCG
1 TAB ORAL DAILY
Status: DISCONTINUED | OUTPATIENT
Start: 2019-01-01 | End: 2019-01-01 | Stop reason: HOSPADM

## 2019-01-01 RX ORDER — POTASSIUM CHLORIDE 750 MG/1
40 CAPSULE, EXTENDED RELEASE ORAL AS NEEDED
Status: DISCONTINUED | OUTPATIENT
Start: 2019-01-01 | End: 2019-01-01 | Stop reason: HOSPADM

## 2019-01-01 RX ORDER — MORPHINE SULFATE 2 MG/ML
2 INJECTION, SOLUTION INTRAMUSCULAR; INTRAVENOUS
Status: DISCONTINUED | OUTPATIENT
Start: 2019-01-01 | End: 2019-01-01

## 2019-01-01 RX ORDER — LORAZEPAM 0.5 MG/1
0.5 TABLET ORAL EVERY 6 HOURS PRN
COMMUNITY
End: 2019-01-01 | Stop reason: HOSPADM

## 2019-01-01 RX ORDER — HYDROXYZINE HYDROCHLORIDE 10 MG/1
10 TABLET, FILM COATED ORAL NIGHTLY
Status: DISCONTINUED | OUTPATIENT
Start: 2019-01-01 | End: 2019-01-01 | Stop reason: HOSPADM

## 2019-01-01 RX ORDER — SPIRONOLACTONE 50 MG/1
25 TABLET, FILM COATED ORAL DAILY
Status: DISCONTINUED | OUTPATIENT
Start: 2019-01-01 | End: 2019-01-01

## 2019-01-01 RX ORDER — BISACODYL 5 MG/1
5 TABLET, DELAYED RELEASE ORAL DAILY PRN
Status: DISCONTINUED | OUTPATIENT
Start: 2019-01-01 | End: 2019-01-01 | Stop reason: HOSPADM

## 2019-01-01 RX ORDER — POTASSIUM CHLORIDE 20 MEQ/1
20 TABLET, EXTENDED RELEASE ORAL DAILY
Status: DISCONTINUED | OUTPATIENT
Start: 2019-01-01 | End: 2019-01-01 | Stop reason: CLARIF

## 2019-01-01 RX ORDER — AMOXICILLIN AND CLAVULANATE POTASSIUM 875; 125 MG/1; MG/1
1 TABLET, FILM COATED ORAL EVERY 12 HOURS SCHEDULED
Status: DISCONTINUED | OUTPATIENT
Start: 2019-01-01 | End: 2019-01-01 | Stop reason: HOSPADM

## 2019-01-01 RX ORDER — LORAZEPAM 0.5 MG/1
0.5 TABLET ORAL EVERY 6 HOURS PRN
Status: DISCONTINUED | OUTPATIENT
Start: 2019-01-01 | End: 2019-01-01 | Stop reason: HOSPADM

## 2019-01-01 RX ORDER — POTASSIUM CHLORIDE 750 MG/1
20 CAPSULE, EXTENDED RELEASE ORAL DAILY
Status: DISCONTINUED | OUTPATIENT
Start: 2019-01-01 | End: 2019-01-01 | Stop reason: HOSPADM

## 2019-01-01 RX ORDER — AMOXICILLIN AND CLAVULANATE POTASSIUM 875; 125 MG/1; MG/1
1 TABLET, FILM COATED ORAL EVERY 12 HOURS SCHEDULED
Qty: 4 TABLET | Refills: 0 | Status: SHIPPED | OUTPATIENT
Start: 2019-01-01 | End: 2019-01-01

## 2019-01-01 RX ORDER — CITALOPRAM 20 MG/1
20 TABLET ORAL DAILY
COMMUNITY
End: 2019-01-01

## 2019-01-01 RX ORDER — TERAZOSIN 1 MG/1
1 CAPSULE ORAL NIGHTLY
COMMUNITY
End: 2019-01-01 | Stop reason: HOSPADM

## 2019-01-01 RX ORDER — FUROSEMIDE 40 MG/1
40 TABLET ORAL DAILY
COMMUNITY

## 2019-01-01 RX ORDER — LOSARTAN POTASSIUM 50 MG/1
50 TABLET ORAL DAILY
Qty: 30 TABLET | Refills: 1 | Status: SHIPPED | OUTPATIENT
Start: 2019-01-01 | End: 2019-01-01

## 2019-01-01 RX ORDER — TRAMADOL HYDROCHLORIDE 50 MG/1
50 TABLET ORAL EVERY 6 HOURS PRN
Qty: 30 TABLET | Refills: 0 | Status: ON HOLD | OUTPATIENT
Start: 2019-01-01 | End: 2019-01-01

## 2019-01-01 RX ORDER — POTASSIUM CHLORIDE 750 MG/1
40 CAPSULE, EXTENDED RELEASE ORAL AS NEEDED
Status: DISCONTINUED | OUTPATIENT
Start: 2019-01-01 | End: 2019-01-01

## 2019-01-01 RX ORDER — TRAMADOL HYDROCHLORIDE 50 MG/1
50 TABLET ORAL EVERY 6 HOURS PRN
Qty: 12 TABLET | Refills: 0 | Status: SHIPPED | OUTPATIENT
Start: 2019-01-01

## 2019-01-01 RX ORDER — ACETAMINOPHEN 650 MG/1
650 SUPPOSITORY RECTAL EVERY 4 HOURS PRN
Status: DISCONTINUED | OUTPATIENT
Start: 2019-01-01 | End: 2019-01-01 | Stop reason: HOSPADM

## 2019-01-01 RX ORDER — LORAZEPAM 1 MG/1
1 TABLET ORAL EVERY 6 HOURS PRN
Status: ON HOLD | COMMUNITY
End: 2019-01-01 | Stop reason: SDUPTHER

## 2019-01-01 RX ORDER — LORAZEPAM 2 MG/ML
0.25 INJECTION INTRAMUSCULAR EVERY 4 HOURS PRN
Status: DISCONTINUED | OUTPATIENT
Start: 2019-01-01 | End: 2019-01-01

## 2019-01-01 RX ORDER — DEXAMETHASONE 0.5 MG/5ML
1 SOLUTION ORAL EVERY 8 HOURS SCHEDULED
Status: DISCONTINUED | OUTPATIENT
Start: 2019-01-01 | End: 2019-01-01 | Stop reason: HOSPADM

## 2019-01-01 RX ORDER — LORAZEPAM 0.5 MG/1
0.5 TABLET ORAL EVERY 8 HOURS PRN
Status: DISCONTINUED | OUTPATIENT
Start: 2019-01-01 | End: 2019-01-01

## 2019-01-01 RX ORDER — METOPROLOL SUCCINATE 25 MG/1
25 TABLET, EXTENDED RELEASE ORAL
Status: DISCONTINUED | OUTPATIENT
Start: 2019-01-01 | End: 2019-01-01 | Stop reason: HOSPADM

## 2019-01-01 RX ORDER — FUROSEMIDE 10 MG/ML
40 INJECTION INTRAMUSCULAR; INTRAVENOUS ONCE
Status: COMPLETED | OUTPATIENT
Start: 2019-01-01 | End: 2019-01-01

## 2019-01-01 RX ORDER — CHOLECALCIFEROL (VITAMIN D3) 125 MCG
5 CAPSULE ORAL NIGHTLY
Status: DISCONTINUED | OUTPATIENT
Start: 2019-01-01 | End: 2019-01-01

## 2019-01-01 RX ORDER — SPIRONOLACTONE 25 MG/1
25 TABLET ORAL DAILY
Qty: 30 TABLET | Refills: 1 | Status: SHIPPED | OUTPATIENT
Start: 2019-01-01 | End: 2019-01-01

## 2019-01-01 RX ORDER — CALCIUM CARBONATE 200(500)MG
1 TABLET,CHEWABLE ORAL AS NEEDED
COMMUNITY
End: 2019-01-01

## 2019-01-01 RX ORDER — LORAZEPAM 1 MG/1
1 TABLET ORAL EVERY 12 HOURS SCHEDULED
Status: DISCONTINUED | OUTPATIENT
Start: 2019-01-01 | End: 2019-01-01 | Stop reason: HOSPADM

## 2019-01-01 RX ORDER — SODIUM CHLORIDE 0.9 % (FLUSH) 0.9 %
10 SYRINGE (ML) INJECTION AS NEEDED
Status: DISCONTINUED | OUTPATIENT
Start: 2019-01-01 | End: 2019-01-01 | Stop reason: HOSPADM

## 2019-01-01 RX ORDER — IBUPROFEN 200 MG
TABLET ORAL EVERY 12 HOURS PRN
Status: DISCONTINUED | OUTPATIENT
Start: 2019-01-01 | End: 2019-01-01 | Stop reason: HOSPADM

## 2019-01-01 RX ORDER — POTASSIUM CHLORIDE 1.5 G/1.77G
20 POWDER, FOR SOLUTION ORAL ONCE
Status: COMPLETED | OUTPATIENT
Start: 2019-01-01 | End: 2019-01-01

## 2019-01-01 RX ORDER — SPIRONOLACTONE 25 MG/1
25 TABLET ORAL DAILY
Status: DISCONTINUED | OUTPATIENT
Start: 2019-01-01 | End: 2019-01-01 | Stop reason: HOSPADM

## 2019-01-01 RX ORDER — LORAZEPAM 1 MG/1
1 TABLET ORAL EVERY 6 HOURS PRN
Status: DISCONTINUED | OUTPATIENT
Start: 2019-01-01 | End: 2019-01-01

## 2019-01-01 RX ORDER — LOSARTAN POTASSIUM 50 MG/1
50 TABLET ORAL DAILY
Status: DISCONTINUED | OUTPATIENT
Start: 2019-01-01 | End: 2019-01-01 | Stop reason: HOSPADM

## 2019-01-01 RX ORDER — MORPHINE SULFATE 2 MG/ML
2 INJECTION, SOLUTION INTRAMUSCULAR; INTRAVENOUS
Status: DISCONTINUED | OUTPATIENT
Start: 2019-01-01 | End: 2019-01-01 | Stop reason: HOSPADM

## 2019-01-01 RX ORDER — LIDOCAINE HYDROCHLORIDE 10 MG/ML
10 INJECTION, SOLUTION EPIDURAL; INFILTRATION; INTRACAUDAL; PERINEURAL ONCE
Status: COMPLETED | OUTPATIENT
Start: 2019-01-01 | End: 2019-01-01

## 2019-01-01 RX ORDER — LOSARTAN POTASSIUM 25 MG/1
50 TABLET ORAL DAILY
Status: DISCONTINUED | OUTPATIENT
Start: 2019-01-01 | End: 2019-01-01 | Stop reason: HOSPADM

## 2019-01-01 RX ORDER — POTASSIUM CHLORIDE 1.5 G/1.77G
40 POWDER, FOR SOLUTION ORAL AS NEEDED
Status: DISCONTINUED | OUTPATIENT
Start: 2019-01-01 | End: 2019-01-01

## 2019-01-01 RX ORDER — HYDROCODONE BITARTRATE AND ACETAMINOPHEN 5; 325 MG/1; MG/1
1 TABLET ORAL EVERY 4 HOURS PRN
Status: DISCONTINUED | OUTPATIENT
Start: 2019-01-01 | End: 2019-01-01 | Stop reason: HOSPADM

## 2019-01-01 RX ORDER — ACETAMINOPHEN 160 MG/5ML
650 SOLUTION ORAL EVERY 4 HOURS PRN
Status: DISCONTINUED | OUTPATIENT
Start: 2019-01-01 | End: 2019-01-01 | Stop reason: HOSPADM

## 2019-01-01 RX ORDER — FUROSEMIDE 10 MG/ML
40 INJECTION INTRAMUSCULAR; INTRAVENOUS 2 TIMES DAILY
Status: DISCONTINUED | OUTPATIENT
Start: 2019-01-01 | End: 2019-01-01

## 2019-01-01 RX ORDER — HYDROCODONE BITARTRATE AND ACETAMINOPHEN 5; 325 MG/1; MG/1
1 TABLET ORAL EVERY 4 HOURS PRN
Qty: 18 TABLET | Refills: 0 | Status: SHIPPED | OUTPATIENT
Start: 2019-01-01 | End: 2019-01-01

## 2019-01-01 RX ORDER — SODIUM CHLORIDE 0.9 % (FLUSH) 0.9 %
10 SYRINGE (ML) INJECTION EVERY 12 HOURS SCHEDULED
Status: DISCONTINUED | OUTPATIENT
Start: 2019-01-01 | End: 2019-01-01 | Stop reason: HOSPADM

## 2019-01-01 RX ORDER — CALCIUM CARBONATE 200(500)MG
2 TABLET,CHEWABLE ORAL 2 TIMES DAILY PRN
Status: DISCONTINUED | OUTPATIENT
Start: 2019-01-01 | End: 2019-01-01 | Stop reason: HOSPADM

## 2019-01-01 RX ORDER — POTASSIUM CHLORIDE 7.45 MG/ML
10 INJECTION INTRAVENOUS
Status: DISCONTINUED | OUTPATIENT
Start: 2019-01-01 | End: 2019-01-01

## 2019-01-01 RX ADMIN — DEXAMETHASONE 1 MG: 0.5 SOLUTION ORAL at 21:50

## 2019-01-01 RX ADMIN — ENOXAPARIN SODIUM 40 MG: 40 INJECTION SUBCUTANEOUS at 17:20

## 2019-01-01 RX ADMIN — CITALOPRAM HYDROBROMIDE 20 MG: 20 TABLET ORAL at 08:32

## 2019-01-01 RX ADMIN — MORPHINE SULFATE 2 MG: 2 INJECTION, SOLUTION INTRAMUSCULAR; INTRAVENOUS at 08:00

## 2019-01-01 RX ADMIN — LIDOCAINE HYDROCHLORIDE 5 ML: 20 SOLUTION ORAL; TOPICAL at 18:27

## 2019-01-01 RX ADMIN — SENNOSIDES,DOCUSATE SODIUM 2 TABLET: 8.6; 5 TABLET, FILM COATED ORAL at 20:09

## 2019-01-01 RX ADMIN — DEXAMETHASONE 1 MG: 0.5 SOLUTION ORAL at 05:00

## 2019-01-01 RX ADMIN — AMLODIPINE BESYLATE 5 MG: 5 TABLET ORAL at 08:55

## 2019-01-01 RX ADMIN — SODIUM CHLORIDE, PRESERVATIVE FREE 3 ML: 5 INJECTION INTRAVENOUS at 09:41

## 2019-01-01 RX ADMIN — Medication 5 MG: at 21:05

## 2019-01-01 RX ADMIN — CITALOPRAM 20 MG: 20 TABLET, FILM COATED ORAL at 18:32

## 2019-01-01 RX ADMIN — SPIRONOLACTONE 25 MG: 50 TABLET ORAL at 09:48

## 2019-01-01 RX ADMIN — SODIUM CHLORIDE, PRESERVATIVE FREE 3 ML: 5 INJECTION INTRAVENOUS at 21:49

## 2019-01-01 RX ADMIN — METOPROLOL SUCCINATE 25 MG: 25 TABLET, EXTENDED RELEASE ORAL at 08:25

## 2019-01-01 RX ADMIN — TAZOBACTAM SODIUM AND PIPERACILLIN SODIUM 3.38 G: 375; 3 INJECTION, SOLUTION INTRAVENOUS at 04:43

## 2019-01-01 RX ADMIN — CITALOPRAM 20 MG: 20 TABLET, FILM COATED ORAL at 08:02

## 2019-01-01 RX ADMIN — HYDROXYZINE HYDROCHLORIDE 10 MG: 10 TABLET, FILM COATED ORAL at 20:30

## 2019-01-01 RX ADMIN — AMLODIPINE BESYLATE 5 MG: 5 TABLET ORAL at 08:02

## 2019-01-01 RX ADMIN — SODIUM CHLORIDE, PRESERVATIVE FREE 10 ML: 5 INJECTION INTRAVENOUS at 00:14

## 2019-01-01 RX ADMIN — TAZOBACTAM SODIUM AND PIPERACILLIN SODIUM 3.38 G: 375; 3 INJECTION, SOLUTION INTRAVENOUS at 20:38

## 2019-01-01 RX ADMIN — POTASSIUM CHLORIDE 10 MEQ: 7.46 INJECTION, SOLUTION INTRAVENOUS at 12:20

## 2019-01-01 RX ADMIN — SODIUM CHLORIDE, PRESERVATIVE FREE 3 ML: 5 INJECTION INTRAVENOUS at 20:37

## 2019-01-01 RX ADMIN — POTASSIUM CHLORIDE 40 MEQ: 750 CAPSULE, EXTENDED RELEASE ORAL at 13:43

## 2019-01-01 RX ADMIN — MULTIPLE VITAMINS W/ MINERALS TAB 1 TABLET: TAB at 09:40

## 2019-01-01 RX ADMIN — DEXAMETHASONE 1 MG: 0.5 SOLUTION ORAL at 21:12

## 2019-01-01 RX ADMIN — AMLODIPINE BESYLATE 5 MG: 5 TABLET ORAL at 18:32

## 2019-01-01 RX ADMIN — METOPROLOL SUCCINATE 25 MG: 25 TABLET, FILM COATED, EXTENDED RELEASE ORAL at 10:41

## 2019-01-01 RX ADMIN — TAZOBACTAM SODIUM AND PIPERACILLIN SODIUM 3.38 G: 375; 3 INJECTION, SOLUTION INTRAVENOUS at 12:33

## 2019-01-01 RX ADMIN — TAZOBACTAM SODIUM AND PIPERACILLIN SODIUM 3.38 G: 375; 3 INJECTION, SOLUTION INTRAVENOUS at 05:12

## 2019-01-01 RX ADMIN — MULTIPLE VITAMINS W/ MINERALS TAB 1 TABLET: TAB at 12:11

## 2019-01-01 RX ADMIN — CITALOPRAM HYDROBROMIDE 20 MG: 20 TABLET ORAL at 08:35

## 2019-01-01 RX ADMIN — LOSARTAN POTASSIUM 50 MG: 50 TABLET, FILM COATED ORAL at 12:11

## 2019-01-01 RX ADMIN — POTASSIUM CHLORIDE 20 MEQ: 750 CAPSULE, EXTENDED RELEASE ORAL at 09:49

## 2019-01-01 RX ADMIN — DEXAMETHASONE 1 MG: 0.5 SOLUTION ORAL at 14:53

## 2019-01-01 RX ADMIN — Medication 5 MG: at 01:18

## 2019-01-01 RX ADMIN — SODIUM CHLORIDE, PRESERVATIVE FREE 3 ML: 5 INJECTION INTRAVENOUS at 08:26

## 2019-01-01 RX ADMIN — FAMOTIDINE 40 MG: 40 TABLET, FILM COATED ORAL at 09:49

## 2019-01-01 RX ADMIN — TAZOBACTAM SODIUM AND PIPERACILLIN SODIUM 3.38 G: 375; 3 INJECTION, SOLUTION INTRAVENOUS at 03:28

## 2019-01-01 RX ADMIN — TAZOBACTAM SODIUM AND PIPERACILLIN SODIUM 3.38 G: 375; 3 INJECTION, SOLUTION INTRAVENOUS at 12:24

## 2019-01-01 RX ADMIN — CITALOPRAM 20 MG: 20 TABLET, FILM COATED ORAL at 09:40

## 2019-01-01 RX ADMIN — POTASSIUM CHLORIDE 20 MEQ: 750 CAPSULE, EXTENDED RELEASE ORAL at 09:40

## 2019-01-01 RX ADMIN — METOPROLOL SUCCINATE 25 MG: 25 TABLET, EXTENDED RELEASE ORAL at 08:37

## 2019-01-01 RX ADMIN — MULTIPLE VITAMINS W/ MINERALS TAB 1 TABLET: TAB at 08:02

## 2019-01-01 RX ADMIN — SODIUM CHLORIDE, PRESERVATIVE FREE 3 ML: 5 INJECTION INTRAVENOUS at 21:32

## 2019-01-01 RX ADMIN — LOSARTAN POTASSIUM 50 MG: 25 TABLET, FILM COATED ORAL at 10:53

## 2019-01-01 RX ADMIN — LORAZEPAM 1 MG: 1 TABLET ORAL at 21:01

## 2019-01-01 RX ADMIN — CITALOPRAM 20 MG: 20 TABLET, FILM COATED ORAL at 08:55

## 2019-01-01 RX ADMIN — TAZOBACTAM SODIUM AND PIPERACILLIN SODIUM 3.38 G: 375; 3 INJECTION, SOLUTION INTRAVENOUS at 21:32

## 2019-01-01 RX ADMIN — MULTIPLE VITAMINS W/ MINERALS TAB 1 TABLET: TAB at 08:55

## 2019-01-01 RX ADMIN — FUROSEMIDE 40 MG: 10 INJECTION, SOLUTION INTRAMUSCULAR; INTRAVENOUS at 17:32

## 2019-01-01 RX ADMIN — SENNOSIDES,DOCUSATE SODIUM 2 TABLET: 8.6; 5 TABLET, FILM COATED ORAL at 20:37

## 2019-01-01 RX ADMIN — POTASSIUM CHLORIDE 10 MEQ: 7.46 INJECTION, SOLUTION INTRAVENOUS at 16:06

## 2019-01-01 RX ADMIN — METOPROLOL SUCCINATE 25 MG: 25 TABLET, FILM COATED, EXTENDED RELEASE ORAL at 12:11

## 2019-01-01 RX ADMIN — FAMOTIDINE 20 MG: 20 TABLET ORAL at 09:04

## 2019-01-01 RX ADMIN — MIDAZOLAM 1 MG: 1 INJECTION INTRAMUSCULAR; INTRAVENOUS at 18:58

## 2019-01-01 RX ADMIN — SODIUM CHLORIDE, PRESERVATIVE FREE 3 ML: 5 INJECTION INTRAVENOUS at 20:30

## 2019-01-01 RX ADMIN — HYDROXYZINE HYDROCHLORIDE 10 MG: 10 TABLET, FILM COATED ORAL at 20:09

## 2019-01-01 RX ADMIN — SENNOSIDES,DOCUSATE SODIUM 2 TABLET: 8.6; 5 TABLET, FILM COATED ORAL at 20:07

## 2019-01-01 RX ADMIN — FAMOTIDINE 20 MG: 20 TABLET ORAL at 10:52

## 2019-01-01 RX ADMIN — METOPROLOL SUCCINATE 25 MG: 25 TABLET, EXTENDED RELEASE ORAL at 08:53

## 2019-01-01 RX ADMIN — FUROSEMIDE 40 MG: 10 INJECTION, SOLUTION INTRAMUSCULAR; INTRAVENOUS at 13:28

## 2019-01-01 RX ADMIN — TRAMADOL HYDROCHLORIDE 50 MG: 50 TABLET ORAL at 22:40

## 2019-01-01 RX ADMIN — SODIUM CHLORIDE 75 ML/HR: 9 INJECTION, SOLUTION INTRAVENOUS at 18:49

## 2019-01-01 RX ADMIN — ENOXAPARIN SODIUM 40 MG: 40 INJECTION SUBCUTANEOUS at 11:00

## 2019-01-01 RX ADMIN — NITROGLYCERIN 1 INCH: 20 OINTMENT TOPICAL at 13:26

## 2019-01-01 RX ADMIN — FAMOTIDINE 20 MG: 20 TABLET ORAL at 08:41

## 2019-01-01 RX ADMIN — ACETAMINOPHEN 650 MG: 325 TABLET, FILM COATED ORAL at 22:41

## 2019-01-01 RX ADMIN — BARIUM SULFATE 20 ML: 400 PASTE ORAL at 13:15

## 2019-01-01 RX ADMIN — Medication 5 MG: at 22:41

## 2019-01-01 RX ADMIN — SENNOSIDES,DOCUSATE SODIUM 2 TABLET: 8.6; 5 TABLET, FILM COATED ORAL at 20:30

## 2019-01-01 RX ADMIN — LOSARTAN POTASSIUM 50 MG: 25 TABLET, FILM COATED ORAL at 08:53

## 2019-01-01 RX ADMIN — FAMOTIDINE 20 MG: 20 TABLET, FILM COATED ORAL at 12:11

## 2019-01-01 RX ADMIN — TAZOBACTAM SODIUM AND PIPERACILLIN SODIUM 3.38 G: 375; 3 INJECTION, SOLUTION INTRAVENOUS at 20:30

## 2019-01-01 RX ADMIN — LOSARTAN POTASSIUM 50 MG: 50 TABLET, FILM COATED ORAL at 09:49

## 2019-01-01 RX ADMIN — FAMOTIDINE 20 MG: 20 TABLET ORAL at 08:35

## 2019-01-01 RX ADMIN — CITALOPRAM 20 MG: 20 TABLET, FILM COATED ORAL at 12:11

## 2019-01-01 RX ADMIN — SPIRONOLACTONE 25 MG: 25 TABLET, FILM COATED ORAL at 09:40

## 2019-01-01 RX ADMIN — MORPHINE SULFATE 2 MG: 2 INJECTION, SOLUTION INTRAMUSCULAR; INTRAVENOUS at 03:06

## 2019-01-01 RX ADMIN — DEXAMETHASONE 1 MG: 0.5 SOLUTION ORAL at 14:22

## 2019-01-01 RX ADMIN — TAZOBACTAM SODIUM AND PIPERACILLIN SODIUM 3.38 G: 375; 3 INJECTION, SOLUTION INTRAVENOUS at 14:31

## 2019-01-01 RX ADMIN — LOSARTAN POTASSIUM 50 MG: 25 TABLET, FILM COATED ORAL at 08:37

## 2019-01-01 RX ADMIN — POTASSIUM CHLORIDE 20 MEQ: 750 CAPSULE, EXTENDED RELEASE ORAL at 08:55

## 2019-01-01 RX ADMIN — DEXAMETHASONE 1 MG: 0.5 SOLUTION ORAL at 06:25

## 2019-01-01 RX ADMIN — HYDROXYZINE HYDROCHLORIDE 10 MG: 10 TABLET, FILM COATED ORAL at 20:07

## 2019-01-01 RX ADMIN — FAMOTIDINE 20 MG: 20 TABLET ORAL at 08:53

## 2019-01-01 RX ADMIN — TAZOBACTAM SODIUM AND PIPERACILLIN SODIUM 3.38 G: 375; 3 INJECTION, SOLUTION INTRAVENOUS at 12:50

## 2019-01-01 RX ADMIN — LORAZEPAM 1 MG: 1 TABLET ORAL at 21:04

## 2019-01-01 RX ADMIN — SODIUM CHLORIDE, PRESERVATIVE FREE 3 ML: 5 INJECTION INTRAVENOUS at 21:14

## 2019-01-01 RX ADMIN — TAZOBACTAM SODIUM AND PIPERACILLIN SODIUM 3.38 G: 375; 3 INJECTION, SOLUTION INTRAVENOUS at 20:09

## 2019-01-01 RX ADMIN — FAMOTIDINE 40 MG: 40 TABLET, FILM COATED ORAL at 08:02

## 2019-01-01 RX ADMIN — CITALOPRAM HYDROBROMIDE 20 MG: 20 TABLET ORAL at 08:53

## 2019-01-01 RX ADMIN — METOPROLOL SUCCINATE 25 MG: 25 TABLET, EXTENDED RELEASE ORAL at 10:52

## 2019-01-01 RX ADMIN — SODIUM CHLORIDE, PRESERVATIVE FREE 3 ML: 5 INJECTION INTRAVENOUS at 20:10

## 2019-01-01 RX ADMIN — HYDROXYZINE HYDROCHLORIDE 10 MG: 10 TABLET, FILM COATED ORAL at 20:49

## 2019-01-01 RX ADMIN — FAMOTIDINE 20 MG: 20 TABLET ORAL at 08:32

## 2019-01-01 RX ADMIN — ENOXAPARIN SODIUM 40 MG: 40 INJECTION SUBCUTANEOUS at 17:32

## 2019-01-01 RX ADMIN — FAMOTIDINE 40 MG: 40 TABLET, FILM COATED ORAL at 08:55

## 2019-01-01 RX ADMIN — LIDOCAINE HYDROCHLORIDE 10 ML: 10 INJECTION, SOLUTION EPIDURAL; INFILTRATION; INTRACAUDAL; PERINEURAL at 16:00

## 2019-01-01 RX ADMIN — LORAZEPAM 1 MG: 1 TABLET ORAL at 09:49

## 2019-01-01 RX ADMIN — SPIRONOLACTONE 25 MG: 25 TABLET, FILM COATED ORAL at 14:51

## 2019-01-01 RX ADMIN — LIDOCAINE HYDROCHLORIDE 5 ML: 20 SOLUTION ORAL; TOPICAL at 18:39

## 2019-01-01 RX ADMIN — METOPROLOL SUCCINATE 25 MG: 25 TABLET, FILM COATED, EXTENDED RELEASE ORAL at 09:40

## 2019-01-01 RX ADMIN — CITALOPRAM HYDROBROMIDE 20 MG: 20 TABLET ORAL at 09:04

## 2019-01-01 RX ADMIN — SODIUM CHLORIDE 75 ML/HR: 9 INJECTION, SOLUTION INTRAVENOUS at 06:25

## 2019-01-01 RX ADMIN — LORAZEPAM 1 MG: 1 TABLET ORAL at 09:40

## 2019-01-01 RX ADMIN — BARIUM SULFATE 100 ML: 0.81 POWDER, FOR SUSPENSION ORAL at 13:15

## 2019-01-01 RX ADMIN — POTASSIUM CHLORIDE 40 MEQ: 750 CAPSULE, EXTENDED RELEASE ORAL at 08:32

## 2019-01-01 RX ADMIN — TAZOBACTAM SODIUM AND PIPERACILLIN SODIUM 3.38 G: 375; 3 INJECTION, SOLUTION INTRAVENOUS at 04:53

## 2019-01-01 RX ADMIN — SODIUM CHLORIDE, PRESERVATIVE FREE 10 ML: 5 INJECTION INTRAVENOUS at 12:24

## 2019-01-01 RX ADMIN — CITALOPRAM 20 MG: 20 TABLET, FILM COATED ORAL at 09:49

## 2019-01-01 RX ADMIN — SODIUM CHLORIDE, PRESERVATIVE FREE 3 ML: 5 INJECTION INTRAVENOUS at 08:56

## 2019-01-01 RX ADMIN — POTASSIUM CHLORIDE 20 MEQ: 1.5 POWDER, FOR SOLUTION ORAL at 15:09

## 2019-01-01 RX ADMIN — SENNOSIDES,DOCUSATE SODIUM 2 TABLET: 8.6; 5 TABLET, FILM COATED ORAL at 20:40

## 2019-01-01 RX ADMIN — DEXAMETHASONE 1 MG: 0.5 SOLUTION ORAL at 12:10

## 2019-01-01 RX ADMIN — AMOXICILLIN AND CLAVULANATE POTASSIUM 1 TABLET: 875; 125 TABLET, FILM COATED ORAL at 12:19

## 2019-01-01 RX ADMIN — DEXAMETHASONE 1 MG: 0.5 SOLUTION ORAL at 13:22

## 2019-01-01 RX ADMIN — TAZOBACTAM SODIUM AND PIPERACILLIN SODIUM 3.38 G: 375; 3 INJECTION, SOLUTION INTRAVENOUS at 20:07

## 2019-01-01 RX ADMIN — SPIRONOLACTONE 25 MG: 25 TABLET, FILM COATED ORAL at 09:48

## 2019-01-01 RX ADMIN — FAMOTIDINE 20 MG: 20 TABLET ORAL at 08:25

## 2019-01-01 RX ADMIN — HYDROXYZINE HYDROCHLORIDE 10 MG: 10 TABLET, FILM COATED ORAL at 20:37

## 2019-01-01 RX ADMIN — AMOXICILLIN AND CLAVULANATE POTASSIUM 1 TABLET: 875; 125 TABLET, FILM COATED ORAL at 10:52

## 2019-01-01 RX ADMIN — SODIUM CHLORIDE, PRESERVATIVE FREE 3 ML: 5 INJECTION INTRAVENOUS at 09:06

## 2019-01-01 RX ADMIN — DEXAMETHASONE 1 MG: 0.5 SOLUTION ORAL at 21:01

## 2019-01-01 RX ADMIN — TAZOBACTAM SODIUM AND PIPERACILLIN SODIUM 3.38 G: 375; 3 INJECTION, SOLUTION INTRAVENOUS at 03:53

## 2019-01-01 RX ADMIN — LIDOCAINE HYDROCHLORIDE 5 ML: 20 SOLUTION ORAL; TOPICAL at 09:41

## 2019-01-01 RX ADMIN — METOPROLOL SUCCINATE 25 MG: 25 TABLET, FILM COATED, EXTENDED RELEASE ORAL at 09:49

## 2019-01-01 RX ADMIN — SODIUM CHLORIDE, PRESERVATIVE FREE 3 ML: 5 INJECTION INTRAVENOUS at 12:14

## 2019-01-01 RX ADMIN — MULTIPLE VITAMINS W/ MINERALS TAB 1 TABLET: TAB at 18:32

## 2019-01-01 RX ADMIN — POTASSIUM CHLORIDE 40 MEQ: 750 CAPSULE, EXTENDED RELEASE ORAL at 17:46

## 2019-01-01 RX ADMIN — ENOXAPARIN SODIUM 40 MG: 40 INJECTION SUBCUTANEOUS at 18:33

## 2019-01-01 RX ADMIN — FAMOTIDINE 20 MG: 20 TABLET, FILM COATED ORAL at 09:40

## 2019-01-01 RX ADMIN — FUROSEMIDE 40 MG: 10 INJECTION, SOLUTION INTRAMUSCULAR; INTRAVENOUS at 17:20

## 2019-01-01 RX ADMIN — TAZOBACTAM SODIUM AND PIPERACILLIN SODIUM 3.38 G: 375; 3 INJECTION, SOLUTION INTRAVENOUS at 11:21

## 2019-01-01 RX ADMIN — HYDROXYZINE HYDROCHLORIDE 10 MG: 10 TABLET, FILM COATED ORAL at 20:40

## 2019-01-01 RX ADMIN — AMOXICILLIN AND CLAVULANATE POTASSIUM 1 TABLET: 875; 125 TABLET, FILM COATED ORAL at 19:58

## 2019-01-01 RX ADMIN — ENOXAPARIN SODIUM 40 MG: 40 INJECTION SUBCUTANEOUS at 12:19

## 2019-01-01 RX ADMIN — HYDROXYZINE HYDROCHLORIDE 10 MG: 10 TABLET ORAL at 21:01

## 2019-01-01 RX ADMIN — POTASSIUM CHLORIDE 20 MEQ: 750 CAPSULE, EXTENDED RELEASE ORAL at 12:11

## 2019-01-01 RX ADMIN — TAZOBACTAM SODIUM AND PIPERACILLIN SODIUM 3.38 G: 375; 3 INJECTION, SOLUTION INTRAVENOUS at 11:24

## 2019-01-01 RX ADMIN — CITALOPRAM HYDROBROMIDE 20 MG: 20 TABLET ORAL at 08:41

## 2019-01-01 RX ADMIN — SODIUM CHLORIDE, PRESERVATIVE FREE 3 ML: 5 INJECTION INTRAVENOUS at 08:42

## 2019-01-01 RX ADMIN — HYDROMORPHONE HYDROCHLORIDE 0.25 MG: 1 INJECTION, SOLUTION INTRAMUSCULAR; INTRAVENOUS; SUBCUTANEOUS at 18:58

## 2019-01-01 RX ADMIN — PERFLUTREN 1 ML: 6.52 INJECTION, SUSPENSION INTRAVENOUS at 11:16

## 2019-01-01 RX ADMIN — DEXAMETHASONE 1 MG: 0.5 SOLUTION ORAL at 16:56

## 2019-01-01 RX ADMIN — HYDROXYZINE HYDROCHLORIDE 10 MG: 10 TABLET, FILM COATED ORAL at 19:58

## 2019-01-01 RX ADMIN — FUROSEMIDE 40 MG: 10 INJECTION, SOLUTION INTRAMUSCULAR; INTRAVENOUS at 17:46

## 2019-01-01 RX ADMIN — TRAMADOL HYDROCHLORIDE 50 MG: 50 TABLET ORAL at 07:18

## 2019-01-01 RX ADMIN — POTASSIUM CHLORIDE 10 MEQ: 7.46 INJECTION, SOLUTION INTRAVENOUS at 11:03

## 2019-01-01 RX ADMIN — SODIUM CHLORIDE, PRESERVATIVE FREE 3 ML: 5 INJECTION INTRAVENOUS at 20:50

## 2019-01-01 RX ADMIN — TAZOBACTAM SODIUM AND PIPERACILLIN SODIUM 3.38 G: 375; 3 INJECTION, SOLUTION INTRAVENOUS at 03:38

## 2019-01-01 RX ADMIN — SODIUM CHLORIDE, PRESERVATIVE FREE 3 ML: 5 INJECTION INTRAVENOUS at 10:53

## 2019-01-01 RX ADMIN — LOSARTAN POTASSIUM 50 MG: 25 TABLET, FILM COATED ORAL at 09:49

## 2019-01-01 RX ADMIN — CITALOPRAM HYDROBROMIDE 20 MG: 20 TABLET ORAL at 10:53

## 2019-01-01 RX ADMIN — SENNOSIDES,DOCUSATE SODIUM 2 TABLET: 8.6; 5 TABLET, FILM COATED ORAL at 20:49

## 2019-01-01 RX ADMIN — LOSARTAN POTASSIUM 50 MG: 25 TABLET, FILM COATED ORAL at 08:25

## 2019-01-01 RX ADMIN — METOPROLOL SUCCINATE 25 MG: 25 TABLET, EXTENDED RELEASE ORAL at 09:49

## 2019-01-01 RX ADMIN — SPIRONOLACTONE 25 MG: 25 TABLET, FILM COATED ORAL at 12:14

## 2019-01-01 RX ADMIN — LORAZEPAM 0.25 MG: 2 INJECTION INTRAMUSCULAR; INTRAVENOUS at 07:59

## 2019-01-01 RX ADMIN — FAMOTIDINE 40 MG: 40 TABLET, FILM COATED ORAL at 18:32

## 2019-01-01 RX ADMIN — LORAZEPAM 1 MG: 1 TABLET ORAL at 12:11

## 2019-01-01 RX ADMIN — LOSARTAN POTASSIUM 50 MG: 50 TABLET, FILM COATED ORAL at 09:40

## 2019-01-01 RX ADMIN — POTASSIUM CHLORIDE 10 MEQ: 7.46 INJECTION, SOLUTION INTRAVENOUS at 14:48

## 2019-01-01 RX ADMIN — HYDROXYZINE HYDROCHLORIDE 10 MG: 10 TABLET ORAL at 21:50

## 2019-01-01 RX ADMIN — LORAZEPAM 0.25 MG: 2 INJECTION INTRAMUSCULAR; INTRAVENOUS at 03:06

## 2019-01-01 RX ADMIN — TAZOBACTAM SODIUM AND PIPERACILLIN SODIUM 3.38 G: 375; 3 INJECTION, SOLUTION INTRAVENOUS at 20:49

## 2019-01-01 RX ADMIN — DEXAMETHASONE 1 MG: 0.5 SOLUTION ORAL at 21:05

## 2019-01-01 RX ADMIN — MULTIPLE VITAMINS W/ MINERALS TAB 1 TABLET: TAB at 09:49

## 2019-01-01 RX ADMIN — LORAZEPAM 1 MG: 1 TABLET ORAL at 21:50

## 2019-01-01 RX ADMIN — SODIUM CHLORIDE, PRESERVATIVE FREE 3 ML: 5 INJECTION INTRAVENOUS at 08:02

## 2019-01-01 RX ADMIN — SODIUM CHLORIDE 500 ML: 9 INJECTION, SOLUTION INTRAVENOUS at 10:44

## 2019-01-01 RX ADMIN — DEXAMETHASONE 1 MG: 0.5 SOLUTION ORAL at 20:45

## 2019-01-01 RX ADMIN — LOSARTAN POTASSIUM 50 MG: 50 TABLET, FILM COATED ORAL at 14:50

## 2019-03-15 PROBLEM — F41.9 ANXIETY DISORDER: Status: ACTIVE | Noted: 2019-01-01

## 2019-03-15 PROBLEM — R42 DIZZINESS: Status: ACTIVE | Noted: 2019-01-01

## 2019-03-15 PROBLEM — F03.90 DEMENTIA WITHOUT BEHAVIORAL DISTURBANCE (HCC): Status: ACTIVE | Noted: 2019-01-01

## 2019-03-15 PROBLEM — R42 LIGHTHEADEDNESS: Status: ACTIVE | Noted: 2019-01-01

## 2019-03-15 PROBLEM — R29.6 FALLS FREQUENTLY: Status: ACTIVE | Noted: 2019-01-01

## 2019-03-15 PROBLEM — L43.9 LICHEN PLANUS: Status: ACTIVE | Noted: 2019-01-01

## 2019-03-15 PROBLEM — I10 ESSENTIAL HYPERTENSION: Status: ACTIVE | Noted: 2019-01-01

## 2019-03-15 PROBLEM — E86.0 DEHYDRATION: Status: ACTIVE | Noted: 2019-01-01

## 2019-03-15 NOTE — H&P
[facility]                                                                                             Nephrology Initial Consult Note    Patient Identification:  Name: Belinda Vega MRN: [id number]  Age: 89 y.o. : 1928  Sex: female  Date:3/15/2019    Requesting Physician: As per consult order.  Reason for Consultation: Dehydration, frequent falls  Information from:patient/ family/ chart      History of Present Illness: This is a 89 y.o. year old female with a history of lichen planus, previously treated with CellCept, also history of hypertension, anxiety, mild dementia.  Was having frequent falls at home associated with some lightheadedness and occasional dizziness, no loss of consciousness, no injuries from the falls.  She denied any chest pain or shortness of breath associated with her symptoms.  Her oral intake has been low secondary to worsening lichen planus, she has had significant weight loss over the last 1 year.  She denies any fevers or chills, cough or congestion, admitted for fluids, dehydration and possible rehab placement.        The following medical history and medications personally reviewed by me:    Problem List:   Patient Active Problem List    Diagnosis   • Dizziness [R42]   • Lightheadedness [R42]   • Falls frequently [R29.6]   • Dehydration [E86.0]   • Lichen planus [L43.9]   • Essential hypertension [I10]   • Anxiety disorder [F41.9]   • Dementia without behavioral disturbance [F03.90]       Past Medical History:  No past medical history on file.  Hypertension, anxiety, lichen planus, dementia  Past Surgical History:  No past surgical history on file.   No prior surgeries  Home Meds:   No medications prior to admission.     See med list  Current Meds:   No current facility-administered medications for this encounter.        Allergies:  Allergies not on file  No drug  allergies    Social History:   Social History     Socioeconomic History   • Marital status:      Spouse name: Not on file   • Number of children: Not on file   • Years of education: Not on file   • Highest education level: Not on file   No tobacco alcohol or illicit drug use lives at home, independent with care with some assistance    Family History:  No family history on file.   No family history of chronic kidney disease    Review of Systems: as per HPI, in addition:    General:      Planes of significant weakness / fatigue, frequent falls  Occasional lightheadedness and dizziness                       No fevers / chills                       no weight loss  HEENT:       no dysphagia / odynophagia  Neck:           normal range of motion, no swelling  Respiratory: no cough / congestion                      No shortness of air                       No wheezing  CV:              No chest pain                       No palpitations  Abdomen/GI: no nausea / vomiting                      No diarrhea / constipation                      No abdominal pain  :             no dysuria / urinary frequency                       No urgency, normal output  Endocrine:   no polyuria / polydipsia,                      No heat or cold intolerance  Skin:           no rashes or skin breakdown   Vascular:   No edema                     No claudication  Psych:        no depression/ anxiety  Neuro:        no focal weakness, no seizures  Musculoskeletal: no joint pain or deformities      Physical Exam:  Vitals:   BP (P) 161/76 (BP Location: Right arm, Patient Position: Sitting)   Pulse 71   SpO2 94%       Wt Readings from Last 1 Encounters:   No data found for Wt       Exam:    General Appearance:  Awake, alert, oriented x3, no acute distress  Chronically ill-appearing, thin   Head and Face:  Normocephalic, atraumatic, mucus membranes moist, oropharynx clear   Eyes:  No icterus, pupils equal round and reactive to light,  extraocular movements intact    ENMT: Moist mucosa, tongue symmetric    Neck: Supple  no jugular venous distention  no thyromegaly   Pulmonary:  Respiratory effort: Normal  Auscultation of lungs: Clear bilaterally  No wheezes  No rhonchi  Good air movement, good expansion   Chest wall:  No tenderness or deformity   Cardiovascular:  Auscultation of the heart: Normal rhythm, no murmurs  No edema of extremities    Abdomen:  Abdomen: soft, non-tender, normal bowel sounds all four quadrants, no masses   Liver and spleen: no hepatosplenomegaly   Musculoskeletal: Digits and nails: normal  Normal range of motion  No joint swelling or gross deformities    Skin: Skin inspection: color normal, no visible rashes or lesions  Skin palpation: texture, turgor normal, no palpable lesions   Lymphatic:  no cervical lymphadenopathy    Psychiatric: Judgement and insight: normal  Orientation to person place and time: normal  Mood and affect: normal       DATA:  Radiology and Labs:  The following labs independently reviewed by me  Old records independently reviewed showing history of lichen planus and hypertension, no prior renal failure  The following radiologic studies independently viewed by me, findings nothing available recently  Interval notes, chart personally reviewed by me.   New problems include dehydration, lightheadedness with frequent falls  Discussed with patient    Risk/ complexity of medical care/ medical decision making  Moderate complexity          Labs:   No results found for this or any previous visit (from the past 24 hour(s)).  Labs are currently pending    Radiology:  Imaging Results (last 24 hours)     ** No results found for the last 24 hours. **               ASSESSMENT:     Dizziness    Lightheadedness    Falls frequently    Dehydration    Lichen planus    Essential hypertension    Anxiety disorder    Dementia without behavioral disturbance        PLAN:   Will admit, check initial labs  Hydrate with normal  saline  PT/OT consults  Consult CCP for rehab placement  Check baseline echocardiogram  Resume home medications, monitor blood pressure closely  Will ask [facility] to follow for medical management    Continue to monitor electrolytes and volume closely    Nestor Berman M.D  [facility]  Office phone number: [phone]  Answering service phone number: [phone]        3/15/2019        Dictation via Dragon dictation software

## 2019-03-15 NOTE — PLAN OF CARE
Problem: Patient Care Overview  Goal: Plan of Care Review  Outcome: Ongoing (interventions implemented as appropriate)   03/15/19 0471   Coping/Psychosocial   Plan of Care Reviewed With patient   Plan of Care Review   Progress no change   OTHER   Outcome Summary pt resting well no c/o pain n/v or soa. vss will continue to monitor

## 2019-03-16 PROBLEM — R01.1 HEART MURMUR: Status: ACTIVE | Noted: 2019-01-01

## 2019-03-16 PROBLEM — E44.0 MODERATE PROTEIN-CALORIE MALNUTRITION (HCC): Status: ACTIVE | Noted: 2019-01-01

## 2019-03-16 PROBLEM — E44.1 MILD MALNUTRITION (HCC): Status: ACTIVE | Noted: 2019-01-01

## 2019-03-16 PROBLEM — S00.83XA TRAUMATIC HEMATOMA OF FOREHEAD: Status: ACTIVE | Noted: 2019-01-01

## 2019-03-16 NOTE — CONSULTS
"Adult Nutrition  Assessment/PES    Patient Name:  Belinda Vega  YOB: 1928  MRN: [id number]  Admit Date:  3/15/2019    Assessment Date:  3/16/2019    Comments:  Consult per nurse admission screen.  Patient admitted with dizziness, frequent falls at home.  Patient has lichen planus of the mouth.  This creates difficulty chewing/swallowing, painful.  Not able to eat many solid foods at all (granddaughter reports none).    Drinks 2-3 Ensures per day (depending on what kind this could be 440-660 kcal/day, 18-27 grams protein/day).  This is not providing adequate nutrition for patient.    Granddaughter reports 100# weight loss (50%) x 2 years.  MSA completed for severe malnutrition.  NFPE completed.    Adding Boost Plus TID, Boost Pudding and Milkshake with protein powder.    Recommend consideration of feeding tube d/t poor PO and inability to eat solid foods.    RD to continue to follow.    Reason for Assessment     Row Name 03/16/19 1444          Reason for Assessment    Reason For Assessment  nurse/nurse practitioner consult;identified at risk by screening criteria     Diagnosis  cardiac disease;psychosocial;neurologic conditions lichen planus, HTN, anxiety, dementia     Identified At Risk by Screening Criteria  MST SCORE 2+;difficulty chewing/swallowing         Nutrition/Diet History     Row Name 03/16/19 1444          Nutrition/Diet History    Typical Food/Fluid Intake  d/t lichen planus of the mouth (has seen many specialists per granddaughter) only is able to really take liquids and/or more liquid foods     Supplemental Drinks/Foods/Additives  granddaughter reports she basically lives off of Ensure (drinks 2-3 times/day)         Anthropometrics     Row Name 03/16/19 1445 03/16/19 1117       Anthropometrics    Height  149.9 cm (59.02\")  149.9 cm (59\")    Weight  45.4 kg (100 lb 1.4 oz)  45.4 kg (100 lb)       Admit Weight    Admit Weight  -- 100# 3/16  --       Ideal Body Weight (IBW)    Ideal Body " "Weight (IBW) (kg)  43.61  43.57    % Ideal Body Weight  104.1  104.1       Usual Body Weight (UBW)    Usual Body Weight  90.7 kg (200 lb)  --    % Usual Body Weight  50.04  --    % of Usual Body Weight Assessment  less than 74% - severe deficit  --    Weight Loss  unintentional 100# (50%)  --    Weight Loss Time Frame  2 years per granddaughter  --       Body Mass Index (BMI)    BMI (kg/m2)  20.25  20.24    BMI Assessment  BMI 18.5-24.9: normal  --       IBW Adjustment, Para/Tetraplegia    5% Adjustment, Para (IBW)  41.43  41.39    10% Adjustment, Para (IBW)  39.25  39.21    10% Adjustment, Tetra (IBW)  39.25  39.21    15% Adjustment, Tetra (IBW)  37.07  37.03    Row Name 03/16/19 0917          Anthropometrics    Height  149.9 cm (59\")        Ideal Body Weight (IBW)    Ideal Body Weight (IBW) (kg)  43.57        IBW Adjustment, Para/Tetraplegia    5% Adjustment, Para (IBW)  41.39     10% Adjustment, Para (IBW)  39.21     10% Adjustment, Tetra (IBW)  39.21     15% Adjustment, Tetra (IBW)  37.03         Labs/Tests/Procedures/Meds     Row Name 03/16/19 1447          Labs/Procedures/Meds    Lab Results Reviewed  reviewed, pertinent     Lab Results Comments  gluc, Na+, Creat        Diagnostic Tests/Procedures    Diagnostic Test/Procedure Reviewed  reviewed, pertinent        Medications    Pertinent Medications Reviewed  reviewed, pertinent     Pertinent Medications Comments  pepcid, MVI, KCl, IVFs @ 75 ml/hr         Physical Findings     Row Name 03/16/19 1447          Physical Findings    Overall Physical Appearance  generalized wasting;loss of subcutaneous fat;loss of muscle mass     Skin  other (see comments) B=18, L leg skin CA removal, wound, bruised         Estimated/Assessed Needs     Row Name 03/16/19 1448 03/16/19 1445       Calculation Measurements    Weight Used For Calculations  45.4 kg (100 lb 1.4 oz)  --    Height  --  149.9 cm (59.02\")       Estimated/Assessed Needs    Additional Documentation  KCAL/KG " "(Group);Protein Requirements (Group);Fluid Requirements (Group)  --       KCAL/KG    14 Kcal/Kg (kcal)  635.6  --    15 Kcal/Kg (kcal)  681  --    18 Kcal/Kg (kcal)  817.2  --    20 Kcal/Kg (kcal)  908  --    25 Kcal/Kg (kcal)  1135  --    30 Kcal/Kg (kcal)  1362  --    35 Kcal/Kg (kcal)  1589  --    40 Kcal/Kg (kcal)  1816  --    45 Kcal/Kg (kcal)  2043  --    50 Kcal/Kg (kcal)  2270  --    kcal/kg (Specify)  -- 4914-7472  --       [place]-St. Jeor Equation    RMR ([place]-St. Jeor Equation)  774.88  --       Protein Requirements    Est Protein Requirement Amount (gms/kg)  1.5 gm protein 54-68  --    Estimated Protein Requirements (gms/day)  68.1  --       Fluid Requirements    Estimated Fluid Requirements (mL/day)  1362  --    Estimated Fluid Requirement Method  RDA Method  --    RDA Method (mL)  1362  --    Juliette-Kamilah Method (over 20 kg)  2408  --    Row Name 03/16/19 1117 03/16/19 0917       Calculation Measurements    Height  149.9 cm (59\")  149.9 cm (59\")        Nutrition Prescription Ordered     Row Name 03/16/19 1448          Nutrition Prescription PO    Current PO Diet  Regular         Evaluation of Received Nutrient/Fluid Intake     Row Name 03/16/19 1448 03/16/19 1445       Calculation Measurements    Weight Used For Calculations  45.4 kg (100 lb 1.4 oz)  --    Height  --  149.9 cm (59.02\")       PO Evaluation    % PO Intake  drank 1 Boost this am per granddaughter  --    Row Name 03/16/19 1117 03/16/19 0917       Calculation Measurements    Height  149.9 cm (59\")  149.9 cm (59\")        Evaluation of Prescribed Nutrient/Fluid Intake     Row Name 03/16/19 1448 03/16/19 1445       Calculation Measurements    Weight Used For Calculations  45.4 kg (100 lb 1.4 oz)  --    Height  --  149.9 cm (59.02\")    Row Name 03/16/19 1117 03/16/19 0917       Calculation Measurements    Height  149.9 cm (59\")  149.9 cm (59\")        Malnutrition Severity Assessment     Row Name 03/16/19 1449          Malnutrition " Severity Assessment    Malnutrition Type  Chronic Illness Malnutrition        Physical Signs of Malnutrition (Chronic)    Muscle Wasting  Severe severe clavicle region, acromion region, temporal region, patellar region     Fat Loss  Severe severe triceps region, orbital region; moderate-severe thoracic region        Weight Status (Chronic)    %UBW  Severe (<75%)     Weight Loss  Severe (>20% / 1 yr)        Energy Intake Status (Chronic)    Energy Intake  Severe (< or equal to 50% / > or equal to 1 mo)        Criteria Met (Must meet criteria for severity in at least 2 of these categories: M Wasting, Fat Loss, Fluid, Secondary Signs, Wt. Status, Intake)    Patient meets criteria for   Severe malnutrition           Problem/Interventions:  Problem 1     Row Name 03/16/19 1451          Nutrition Diagnoses Problem 1    Problem 1  Malnutrition     Etiology (related to)  Medical Diagnosis;Factors Affecting Nutrition     Immune  Other (comment) lichen planus of mouth     Oral  Other (comment);Mouth Pain lichen planus of mouth, hard to chew/swallow     Signs/Symptoms (evidenced by)  Report/Observation;Report of Mnimal PO Intake;% UBW;Unintended Weight Change     Percent (%) UBW  50 %     Unintended Weight Change  Loss     Number of Pounds Lost  100     Weight loss time period  2 years     Reported/Observed By  Patient;Family                 Intervention Goal     Row Name 03/16/19 1452          Intervention Goal    General  Maintain nutrition;Reduce/improve symptoms;Meet nutritional needs for age/condition;Disease management/therapy     PO  Increase intake;PO intake (%);Meet estimated needs with nutrition supplements     PO Intake %  100 %     Weight  Appropriate weight gain         Nutrition Intervention     Row Name 03/16/19 1452          Nutrition Intervention    RD/Tech Action  Follow Tx progress;Care plan reviewd;Encourage intake;Recommend/ordered     Recommended/Ordered  Supplement         Nutrition Prescription     Row  Name 03/16/19 1452          Nutrition Prescription PO    PO Prescription  Begin/change supplement     Supplement  Boost Plus;Boost Pudding;PRO powder;Milkshake     New PO Prescription Ordered?  Yes         Education/Evaluation     Row Name 03/16/19 1453          Education    Education  Will Instruct as appropriate        Monitor/Evaluation    Monitor  Per protocol;PO intake;Supplement intake;Pertinent labs;Weight;Skin status;Symptoms     Education Follow-up  Reinforce PRN           Electronically signed by:  Marija Martinez RD  03/16/19 2:53 PM

## 2019-03-16 NOTE — PLAN OF CARE
Problem: Patient Care Overview  Goal: Plan of Care Review  Outcome: Ongoing (interventions implemented as appropriate)   03/16/19 7217   Coping/Psychosocial   Plan of Care Reviewed With patient   Plan of Care Review   Progress no change   OTHER   Outcome Summary Pt has been up several times to the [facility] with assistance. Forgets to use the call light sometimes and attempts to get up by herself. reinforcement given on the importance to call for assistance to prevent falls. Pills whole with water. Pt does not eat much solid food due to lichen planus of the mouth. Drinks mainly nutrition shakes. Bed alarm at all times.

## 2019-03-16 NOTE — PROGRESS NOTES
Malnutrition Severity Assessment    Patient Name:  Belinda Vega  YOB: 1928  MRN: [id number]  Admit Date:  3/15/2019    Patient meets criteria for : Severe malnutrition    Comments:  50% UBW, 100# (50%) weight loss x 2 years, decreased PO intake.    Physical exam completed and detailed in chart below.    Malnutrition Type: Chronic Illness Malnutrition     Malnutrition Type (last 8 hours)      Malnutrition Severity Assessment     Row Name 03/16/19 1449       Malnutrition Severity Assessment    Malnutrition Type  Chronic Illness Malnutrition    Row Name 03/16/19 1449       Physical Signs of Malnutrition (Chronic)    Muscle Wasting  Severe severe clavicle region, acromion region, temporal region, patellar region    Fat Loss  Severe severe triceps region, orbital region; moderate-severe thoracic region    Row Name 03/16/19 1449       Weight Status (Chronic)    %UBW  Severe (<75%)    Weight Loss  Severe (>20% / 1 yr)    Row Name 03/16/19 1449       Energy Intake Status (Chronic)    Energy Intake  Severe (< or equal to 50% / > or equal to 1 mo)    Row Name 03/16/19 1449       Criteria Met (Must meet criteria for severity in at least 2 of these categories: M Wasting, Fat Loss, Fluid, Secondary Signs, Wt. Status, Intake)    Patient meets criteria for   Severe malnutrition          Electronically signed by:  Marija Martinez RD  03/16/19 2:57 PM

## 2019-03-16 NOTE — CONSULTS
Patient Name:  Belinda Vega  YOB: 1928  MRN:  [id number]  Admit Date:  3/15/2019  Patient Care Team:  Marlen Berman MD as PCP - General (Nephrology)      Subjective   History Present Illness     No chief complaint on file.  cc- weakness, falls    Ms. Vega is a 89 y.o. with a history of lichen planus, previously treated with CellCept, also history of hypertension, anxiety, mild dementia.  She has had significant issues due to her lichen planus.  Has had approximately 100 pound weight loss over the past couple of years due to decreased oral intake.  They have had thorough evaluation for this including dermatology dentistry/oral surgery.  Have even went to referral center but have not found adequate treatment.  Her nutrition has been attempted to be maintained by Ensure shakes.  She has expressed wishes of not wanting any kind of artificial feeding.  She has had increasing falls and weakness over the past week.  Has had 2 falls 1 of which hit her head on another hit her back.  She denies syncope.  She denies any prodromal symptoms of dizziness.  She has hard time describing these falls.  Has been admitted and started on IV fluids.          History of Present Illness  Review of Systems   Constitutional: Positive for unexpected weight change. Negative for fever.   HENT: Positive for trouble swallowing. Negative for congestion.    Eyes: Negative.    Respiratory: Negative.    Cardiovascular: Negative for chest pain, palpitations and leg swelling.   Gastrointestinal: Negative.    Endocrine: Negative.    Genitourinary: Negative.    Musculoskeletal: Negative.    Skin: Negative.    Allergic/Immunologic: Negative.    Neurological: Positive for weakness (generalized). Negative for headaches.   Hematological: Negative.    Psychiatric/Behavioral: Negative.         Personal History     Patient Active Problem List   Diagnosis   • Dizziness   • Lightheadedness   • Falls frequently   • Dehydration   • Lichen  planus   • Essential hypertension   • Anxiety disorder   • Dementia without behavioral disturbance   • Moderate protein-calorie malnutrition (CMS/HCC)   • Traumatic hematoma of forehead   • Heart murmur         No past medical history on file.  No past surgical history on file.  Family History   Problem Relation Age of Onset   • Hypertension Other      Social History     Tobacco Use   • Smoking status: Never Smoker   • Smokeless tobacco: Never Used   Substance Use Topics   • Alcohol use: No     Frequency: Never   • Drug use: No     Medications Prior to Admission   Medication Sig Dispense Refill Last Dose   • citalopram (CeleXA) 20 MG tablet Take 20 mg by mouth Daily.      • dexamethasone 0.5 MG/5ML solution Take  by mouth Every 6 (Six) Hours As Needed.      • LORazepam (ATIVAN) 1 MG tablet Take 1 mg by mouth Every 6 (Six) Hours As Needed for Anxiety.      • terazosin (HYTRIN) 1 MG capsule Take 1 mg by mouth Every Night.      • traMADol (ULTRAM) 50 MG tablet Take 50 mg by mouth Every 6 (Six) Hours As Needed for Moderate Pain .      • ZOLPIDEM TARTRATE PO Take 10 mg by mouth Every Night.        Allergies:  No Known Allergies    Objective    Objective     Vital Signs  Temp:  [98 °F (36.7 °C)-98.4 °F (36.9 °C)] 98.1 °F (36.7 °C)  Heart Rate:  [71-93] 92  Resp:  [16-18] 16  BP: (160-172)/(76-80) 160/79  SpO2:  [94 %-96 %] 96 %  on   ;   Device (Oxygen Therapy): room air  Body mass index is 20.2 kg/m².    Physical Exam   Constitutional: She is oriented to person, place, and time. No distress.   Elderly     HENT:   +bruising and hematoma to head/face  +lichen planus in mouth   Eyes: EOM are normal. Pupils are equal, round, and reactive to light. No scleral icterus.   Neck: Normal range of motion. Neck supple.   Cardiovascular: Normal rate and regular rhythm.   Murmur heard.  Pulmonary/Chest: Effort normal and breath sounds normal.   Abdominal: Soft. Bowel sounds are normal. She exhibits no distension. There is no  tenderness.   Genitourinary:   Genitourinary Comments: Deferred    Musculoskeletal: She exhibits no edema or deformity.   +muscle atrophy   Neurological: She is alert and oriented to person, place, and time. No cranial nerve deficit. Coordination normal.   4+/5 strength in all extremities     Skin: Skin is warm and dry. No rash noted.   Psychiatric: She has a normal mood and affect. Her behavior is normal. Thought content normal.   Nursing note and vitals reviewed.      Results Review:  I reviewed the patient's new clinical results.  I reviewed the patient's new imaging results and agree with the interpretation.  I reviewed the patient's other test results and agree with the interpretation  I personally viewed and interpreted the patient's EKG/Telemetry data  Discussed with ED provider.      Lab Results (last 24 hours)     Procedure Component Value Units Date/Time    CBC Auto Differential [[id number]]  (Abnormal) Collected:  03/15/19 1539    Specimen:  Blood Updated:  03/15/19 1603     WBC 8.01 10*3/mm3      RBC 3.62 10*6/mm3      Hemoglobin 11.3 g/dL      Hematocrit 35.0 %      MCV 96.7 fL      MCH 31.2 pg      MCHC 32.3 g/dL      RDW 12.6 %      RDW-SD 45.1 fl      MPV 11.3 fL      Platelets 195 10*3/mm3      Neutrophil % 55.5 %      Lymphocyte % 36.0 %      Monocyte % 8.1 %      Eosinophil % 0.0 %      Basophil % 0.2 %      Immature Grans % 0.2 %      Neutrophils, Absolute 4.44 10*3/mm3      Lymphocytes, Absolute 2.88 10*3/mm3      Monocytes, Absolute 0.65 10*3/mm3      Eosinophils, Absolute 0.00 10*3/mm3      Basophils, Absolute 0.02 10*3/mm3      Immature Grans, Absolute 0.02 10*3/mm3      nRBC 0.0 /100 WBC     Comprehensive Metabolic Panel [[id number]]  (Abnormal) Collected:  03/15/19 1539    Specimen:  Blood Updated:  03/15/19 1631     Glucose 91 mg/dL      BUN 13 mg/dL      Creatinine 0.42 mg/dL      Sodium 134 mmol/L      Potassium 4.4 mmol/L      Chloride 94 mmol/L      CO2 29.9 mmol/L      Calcium 9.2 mg/dL       Total Protein 5.5 g/dL      Albumin 3.50 g/dL      ALT (SGPT) 7 U/L      AST (SGOT) 15 U/L      Alkaline Phosphatase 84 U/L      Total Bilirubin 0.3 mg/dL      eGFR Non African Amer 141 mL/min/1.73      Globulin 2.0 gm/dL      A/G Ratio 1.8 g/dL      BUN/Creatinine Ratio 31.0     Anion Gap 10.1 mmol/L     Narrative:       GFR Normal >60  Chronic Kidney Disease <60  Kidney Failure <15    Magnesium [[id number]]  (Normal) Collected:  03/15/19 1539    Specimen:  Blood Updated:  03/15/19 1631     Magnesium 2.2 mg/dL     Phosphorus [[id number]]  (Normal) Collected:  03/15/19 1539    Specimen:  Blood Updated:  03/15/19 1631     Phosphorus 3.6 mg/dL     Urinalysis With Microscopic If Indicated (No Culture) - Urine, Clean Catch [[id number]]  (Abnormal) Collected:  03/16/19 0216    Specimen:  Urine, Clean Catch Updated:  03/16/19 0250     Color, UA Yellow     Appearance, UA Cloudy     pH, UA 8.5     Specific Gravity, UA 1.013     Glucose, UA Negative     Ketones, UA Negative     Bilirubin, UA Negative     Blood, UA Negative     Protein, UA Negative     Leuk Esterase, UA Negative     Nitrite, UA Negative     Urobilinogen, UA 0.2 E.U./dL    Narrative:       Urine microscopic not indicated.    Basic Metabolic Panel [[id number]]  (Abnormal) Collected:  03/16/19 0600    Specimen:  Blood Updated:  03/16/19 0725     Glucose 118 mg/dL      BUN 9 mg/dL      Creatinine 0.37 mg/dL      Sodium 133 mmol/L      Potassium 3.5 mmol/L      Chloride 93 mmol/L      CO2 27.7 mmol/L      Calcium 8.6 mg/dL      eGFR Non African Amer >150 mL/min/1.73      BUN/Creatinine Ratio 24.3     Anion Gap 12.3 mmol/L     Narrative:       GFR Normal >60  Chronic Kidney Disease <60  Kidney Failure <15    Magnesium [[id number]]  (Normal) Collected:  03/16/19 0600    Specimen:  Blood Updated:  03/16/19 0725     Magnesium 2.0 mg/dL           Imaging Results (last 24 hours)     ** No results found for the last 24 hours. **               No orders to display         Assessment/Plan     Active Hospital Problems    Diagnosis Date Noted   • **Dizziness [R42] 03/15/2019   • Moderate protein-calorie malnutrition (CMS/HCC) [E44.0] 03/16/2019   • Traumatic hematoma of forehead [S00.83XA] 03/16/2019   • Heart murmur [R01.1] 03/16/2019   • Lightheadedness [R42] 03/15/2019   • Falls frequently [R29.6] 03/15/2019   • Dehydration [E86.0] 03/15/2019   • Lichen planus [L43.9] 03/15/2019   • Essential hypertension [I10] 03/15/2019   • Anxiety disorder [F41.9] 03/15/2019   • Dementia without behavioral disturbance [F03.90] 03/15/2019     · PT/OT/CCP consultation  · Check CT head with fall and head injury  · Gentle IVFs  · Check orthostatics  · ECHO already done, follow results but preliminary results show new decrease in EF. May need Cardiology consultation depending on the ECHO findings for new cardiomyopathy  · Check TSH and B12  · Nutrition consultation, has had significant weight loss  · Continue majority of home meds    Compa Villalba MD  [facility]  03/16/19  1:04 PM

## 2019-03-16 NOTE — PLAN OF CARE
Problem: Nutrition, Imbalanced: Inadequate Oral Intake (Adult)  Intervention: Promote/Optimize Nutrition   03/16/19 1459   Nutrition Interventions   Oral Nutrition Promotion calorie dense liquids provided;nutritional therapy counseling provided; MSA completed for severe malnutrition; encouraged PO intake; added nutrition supplements

## 2019-03-16 NOTE — THERAPY EVALUATION
Acute Care - Physical Therapy Initial Evaluation  [facility]     Patient Name: Belinda Vega  : 1928  MRN: [id number]  Today's Date: 3/16/2019      Date of Referral to PT: 19  Referring Physician: Dr. Berman      Admit Date: 3/15/2019    Visit Dx:     ICD-10-CM ICD-9-CM   1. Impaired functional mobility, balance, gait, and endurance Z74.09 V49.89     Patient Active Problem List   Diagnosis   • Dizziness   • Lightheadedness   • Falls frequently   • Dehydration   • Lichen planus   • Essential hypertension   • Anxiety disorder   • Dementia without behavioral disturbance   • Moderate protein-calorie malnutrition (CMS/HCC)   • Traumatic hematoma of forehead   • Heart murmur     History reviewed. No pertinent past medical history.  History reviewed. No pertinent surgical history.     PT ASSESSMENT (last 12 hours)      Physical Therapy Evaluation     Row Name 19 1433          PT Evaluation Time/Intention    Subjective Information  complains of;weakness;fatigue  -DO     Document Type  evaluation  -DO     Mode of Treatment  physical therapy  -DO     Total Evaluation Minutes, Physical Therapy  15  -DO     Patient Effort  good  -DO     Symptoms Noted During/After Treatment  none  -DO     Comment  Pt tolerated evaluation well with no adverse s/s noted; VSS throughout.   -DO     Row Name 19 1433          General Information    Patient Profile Reviewed?  yes  -DO     Referring Physician  Dr. Berman  -DO     Patient Observations  alert;cooperative;agree to therapy  -DO     Patient/Family Observations  Pt supine in bed in no apparent distress. Family present.   -DO     Prior Level of Function  independent:;all household mobility;community mobility  -DO     Equipment Currently Used at Home  -- 3-wheeled RWx.  -DO     Existing Precautions/Restrictions  fall  -DO     Benefits Reviewed  patient and family:;improve function;increase independence;increase strength;increase balance  -DO     Barriers to Rehab   none identified  -DO     Row Name 03/16/19 1433          Resource/Environmental Concerns    Current Living Arrangements  home/apartment/condo 3 steps to enter home.   -DO     Resource/Environmental Concerns  none  -DO     Row Name 03/16/19 1433          Cognitive Assessment/Intervention- PT/OT    Orientation Status (Cognition)  oriented x 3  -DO     Follows Commands (Cognition)  follows one step commands;over 90% accuracy;physical/tactile prompts required;repetition of directions required;verbal cues/prompting required  -DO     Safety Deficit (Cognitive)  mild deficit;ability to follow commands;awareness of need for assistance;insight into deficits/self awareness  -DO     Row Name 03/16/19 1433          Safety Issues, Functional Mobility    Safety Issues Affecting Function (Mobility)  ability to follow commands;awareness of need for assistance;insight into deficits/self awareness  -DO     Impairments Affecting Function (Mobility)  balance;endurance/activity tolerance;strength  -DO     Row Name 03/16/19 1433          Bed Mobility Assessment/Treatment    Bed Mobility Assessment/Treatment  supine-sit;sit-supine  -DO     Supine-Sit [place] (Bed Mobility)  contact guard;minimum assist (75% patient effort);verbal cues;nonverbal cues (demo/gesture)  -DO     Sit-Supine [place] (Bed Mobility)  minimum assist (75% patient effort);verbal cues;nonverbal cues (demo/gesture);2 person assist assist at trunk and BLEs.   -DO     Bed Mobility, Safety Issues  impaired trunk control for bed mobility  -DO     Assistive Device (Bed Mobility)  bed rails;head of bed elevated  -DO     Row Name 03/16/19 1433          Transfer Assessment/Treatment    Transfer Assessment/Treatment  sit-stand transfer;stand-sit transfer  -DO     Sit-Stand [place] (Transfers)  minimum assist (75% patient effort);2 person assist;nonverbal cues (demo/gesture);contact guard  -DO     Stand-Sit [place] (Transfers)  minimum assist (75% patient  effort);verbal cues;nonverbal cues (demo/gesture)  -DO     Row Name 03/16/19 1433          Sit-Stand Transfer    Assistive Device (Sit-Stand Transfers)  walker, front-wheeled  -DO     Row Name 03/16/19 [facility]          Stand-Sit Transfer    Assistive Device (Stand-Sit Transfers)  walker, front-wheeled  -DO     Row Name 03/16/19 1433          Gait/Stairs Assessment/Training    [place] Level (Gait)  minimum assist (75% patient effort);verbal cues;nonverbal cues (demo/gesture);1 person to manage equipment  -DO     Assistive Device (Gait)  -- Pt has 3-wheeled RWx in room.   -DO     Distance in Feet (Gait)  60  -DO     Pattern (Gait)  step-through  -DO     Deviations/Abnormal Patterns (Gait)  base of support, narrow;gait speed decreased;stride length decreased  -DO     Bilateral Gait Deviations  -- posterior lean.   -DO     Row Name 03/16/19 1433          General ROM    GENERAL ROM COMMENTS  AROM is WFL for the BUE/BLE.   -DO     Row Name 03/16/19 1433          MMT (Manual Muscle Testing)    General MMT Comments  Pt demonstrates functional strength of at least 4/5 in the BUE/BLE.   -DO     Row Name 03/16/19 1433          Sensory Assessment/Intervention    Sensory General Assessment  no sensation deficits identified  -DO     Row Name 03/16/19 1433          Vision Assessment/Intervention    Visual Impairment/Limitations  WFL;corrective lenses full time  -DO     Row Name 03/16/19 [facility]          Pain Assessment    Additional Documentation  Pain Scale: Numbers Pre/Post-Treatment (Group)  -DO     Row Name 03/16/19 1433          Pain Scale: Numbers Pre/Post-Treatment    Pain Scale: Numbers, Pretreatment  0/10 - no pain denies any HA as well.   -DO     Pain Scale: Numbers, Post-Treatment  0/10 - no pain  -DO     Row Name             Wound 03/15/19 2051 Left anterior;lower;proximal leg surgical    Wound - Properties Group Date first assessed: 03/15/19  -DB Time first assessed: 2051  -DB Side: Left  -DB Orientation:  anterior;lower;proximal  -DB Location: leg  -DB Type: surgical  -DB Stage, Pressure Injury: other (see comments)  -DB, skin cancer removal     Row Name 03/16/19 1433          Physical Therapy Clinical Impression    Date of Referral to PT  03/16/19  -DO     PT Diagnosis (PT Clinical Impression)  Impaired mobility, generalized weakness, decreased activity tolerance  -DO     Prognosis (PT Clinical Impression)  good  -DO     Functional Level at Time of Evaluation (PT Clinical Impression)  Min-Modx1 and use of Rwx.   -DO     Criteria for Skilled Interventions Met (PT Clinical Impression)  yes  -DO     Pathology/Pathophysiology Noted (Describe Specifically for Each System)  musculoskeletal  -DO     Impairments Found (describe specific impairments)  aerobic capacity/endurance;gait, locomotion, and balance;muscle performance  -DO     Functional Limitations in Following Categories (Describe Specific Limitations)  self-care;home management;community/leisure  -DO     Rehab Potential (PT Clinical Summary)  good, to achieve stated therapy goals  -DO     Row Name 03/16/19 1433          Physical Therapy Goals    Bed Mobility Goal Selection (PT)  bed mobility, PT goal 1  -DO     Transfer Goal Selection (PT)  transfer, PT goal 1  -DO     Gait Training Goal Selection (PT)  gait training, PT goal 1  -DO     Row Name 03/16/19 1433          Bed Mobility Goal 1 (PT)    Activity/Assistive Device (Bed Mobility Goal 1, PT)  bed mobility activities, all  -DO     [place] Level/Cues Needed (Bed Mobility Goal 1, PT)  independent  -DO     Time Frame (Bed Mobility Goal 1, PT)  long term goal (LTG);1 week  -DO     Progress/Outcomes (Bed Mobility Goal 1, PT)  continuing progress toward goal  -DO     Row Name 03/16/19 1433          Transfer Goal 1 (PT)    Activity/Assistive Device (Transfer Goal 1, PT)  transfers, all  -DO     [place] Level/Cues Needed (Transfer Goal 1, PT)  conditional independence  -DO     Time Frame (Transfer Goal 1,  PT)  long term goal (LTG);1 week  -DO     Progress/Outcome (Transfer Goal 1, PT)  continuing progress toward goal  -DO     Row Name 03/16/19 1433          Gait Training Goal 1 (PT)    Activity/Assistive Device (Gait Training Goal 1, PT)  gait (walking locomotion)  -DO     [place] Level (Gait Training Goal 1, PT)  conditional independence  -DO     Distance (Gait Goal 1, PT)  350  -DO     Time Frame (Gait Training Goal 1, PT)  long term goal (LTG);1 week  -DO     Progress/Outcome (Gait Training Goal 1, PT)  continuing progress toward goal  -DO     Row Name 03/16/19 1433          Positioning and Restraints    Pre-Treatment Position  in bed  -DO     Post Treatment Position  bed  -DO     In Bed  supine;call light within reach;encouraged to call for assist;with family/caregiver  -DO       User Key  (r) = Recorded By, (t) = Taken By, (c) = Cosigned By    Initials Name Provider Type    Shamir Hankins RN Registered Nurse    Claude Rodriguez, PT Physical Therapist        Physical Therapy Education     Title: PT OT SLP Therapies (Done)     Topic: Physical Therapy (Done)     Point: Mobility training (Done)     Learning Progress Summary           Patient Acceptance, E, VU,DU,NR by DO at 3/16/2019  2:59 PM   Family Acceptance, E, VU,DU,NR by DO at 3/16/2019  2:59 PM                   Point: Home exercise program (Done)     Learning Progress Summary           Patient Acceptance, E, VU,DU,NR by DO at 3/16/2019  2:59 PM   Family Acceptance, E, VU,DU,NR by DO at 3/16/2019  2:59 PM                   Point: Body mechanics (Done)     Learning Progress Summary           Patient Acceptance, E, VU,DU,NR by DO at 3/16/2019  2:59 PM   Family Acceptance, E, VU,DU,NR by DO at 3/16/2019  2:59 PM                   Point: Precautions (Done)     Learning Progress Summary           Patient Acceptance, E, VU,DU,NR by DO at 3/16/2019  2:59 PM   Family Acceptance, E, VU,DU,NR by DO at 3/16/2019  2:59 PM                               User Key      Initials Effective Dates Name Provider Type Discipline    DO 03/07/18 -  Claude Womakc, PT Physical Therapist PT              PT Recommendation and Plan  Anticipated Discharge Disposition (PT): skilled nursing facility(pending progress and assistance at home. )  Planned Therapy Interventions (PT Eval): bed mobility training, gait training, strengthening, transfer training  Therapy Frequency (PT Clinical Impression): daily  Outcome Summary/Treatment Plan (PT)  Anticipated Equipment Needs at Discharge (PT): (Pt already has 3-wheeled RWx.)  Anticipated Discharge Disposition (PT): skilled nursing facility(pending progress and assistance at home. )  Plan of Care Reviewed With: patient, family  Outcome Summary: Pt is a 89 y.o. female presenting to PT with impaired mobility, generalized weakness, and decreased activity tolerance after being admitted to [facility] for dizziness. Pt was able to perform bed mobility, transfers, and ambulate 60 ft with RWx and Minx1. She demonstrates no overt unsteadiness/LOB during mobility. She also denies any lightheadedness/dizziness throughout. She will benefit from skilled PT to address her impairments in order to improve her independence with functional mobility and to reduce her falls risk as she has a history of frequent falling at home. PT recommends d/c to SNF at this time pending progress.   Outcome Measures     Row Name 03/16/19 1500             How much help from another person do you currently need...    Turning from your back to your side while in flat bed without using bedrails?  4  -DO      Moving from lying on back to sitting on the side of a flat bed without bedrails?  3  -DO      Moving to and from a bed to a chair (including a wheelchair)?  3  -DO      Standing up from a chair using your arms (e.g., wheelchair, bedside chair)?  3  -DO      Climbing 3-5 steps with a railing?  2  -DO      To walk in hospital room?  3  -DO      AM-PAC 6 Clicks Score  18  -DO          Functional Assessment    Outcome Measure Options  AM-PAC 6 Clicks Basic Mobility (PT)  -DO        User Key  (r) = Recorded By, (t) = Taken By, (c) = Cosigned By    Initials Name Provider Type    Claude Rodriguez PT Physical Therapist         Time Calculation:   PT Charges     Row Name 03/16/19 1502             Time Calculation    Start Time  1433  -DO      Stop Time  1448  -DO      Time Calculation (min)  15 min  -DO      PT Received On  03/16/19  -DO      PT - Next Appointment  03/17/19  -DO      PT Goal Re-Cert Due Date  03/23/19  -DO        User Key  (r) = Recorded By, (t) = Taken By, (c) = Cosigned By    Initials Name Provider Type    Claude Rodriguez PT Physical Therapist        Therapy Suggested Charges     Code   Minutes Charges    None           Therapy Charges for Today     Code Description Service Date Service Provider Modifiers Qty    73179216065 HC PT EVAL MOD COMPLEXITY 2 3/16/2019 Claude Womack, PT GP 1    85516302132 HC PT THER PROC EA 15 MIN 3/16/2019 Claude Womack, PT GP 1          PT G-Codes  Outcome Measure Options: AM-PAC 6 Clicks Basic Mobility (PT)  AM-PAC 6 Clicks Score: 18      Claude Womack PT  3/16/2019

## 2019-03-16 NOTE — PLAN OF CARE
Problem: Patient Care Overview  Goal: Plan of Care Review  Outcome: Ongoing (interventions implemented as appropriate)   03/16/19 9028   Coping/Psychosocial   Plan of Care Reviewed With patient;family   Plan of Care Review   Progress no change   OTHER   Outcome Summary pt on NS @ 75, working w/ PT, frequently up to bedside commode w/ assist, multiple dietary boosters to help with po caloric intake. VSS, continue to monitor closely.       Problem: Fall Risk (Adult)  Goal: Identify Related Risk Factors and Signs and Symptoms  Outcome: Outcome(s) achieved Date Met: 03/16/19    Goal: Absence of Fall  Outcome: Ongoing (interventions implemented as appropriate)      Problem: Skin Injury Risk (Adult)  Goal: Identify Related Risk Factors and Signs and Symptoms  Outcome: Outcome(s) achieved Date Met: 03/16/19    Goal: Skin Health and Integrity  Outcome: Ongoing (interventions implemented as appropriate)      Problem: Nutrition, Imbalanced: Inadequate Oral Intake (Adult)  Goal: Identify Related Risk Factors and Signs and Symptoms  Outcome: Outcome(s) achieved Date Met: 03/16/19    Goal: Improved Oral Intake  Outcome: Ongoing (interventions implemented as appropriate)    Goal: Prevent Further Weight Loss  Outcome: Ongoing (interventions implemented as appropriate)

## 2019-03-16 NOTE — PROGRESS NOTES
: the chart reviewed , the pt. Feeling better     Vital Signs  Vitals:    03/16/19 1519   BP:    Pulse: 81   Resp: 16   Temp: 98.3 °F (36.8 °C)   SpO2:      No intake/output data recorded.  I/O last 3 completed shifts:  In: 200 [P.O.:200]  Out: 100 [Urine:100]      Physical Exam:    General: in nad  HEENT:  Normo cephalic,bruise over left side of forehead,, EOMI, PERRLA, NO icterus,  Neck:  Supple, No JVD, No Carotid artery bruit, No lymphadenopathy  Chest: Clear to auscultation bilaterally,   Cardiovascular: Regular rate and rhythm. Positive S1 & S2, No rub, murmur or gallop.  Abdominal: Soft, non tender, no palpable organomegaly, no abdominal bruit, positive bowel sounds  Musculoskeletal: No joint tenderness or swelling, good range of motion, Adequate muscle strength on both sides, no cyanosis, clubbing or edema on lower extremities.  Neuro: Alert oriented x3, CN1 - 12 intact, No focal sensory or motor deficit.      Review of Systems:   CNS: Denied headaches, blurred vision, tingling or numbness in any part of body. No weakness or any impaired speech.  CVS: No chest pain or shortness of breath, No orthopnea or PND or exertional dyspnea,  Pulmonary: Denies shortness of breath, coughs, hematemesis, night sweats or weight loss.  GI: Denies diarrhea, nausea, vomiting. Denies weight loss, hematemesis, melena.  : Denies dysuria, frequency or hesitancy of urination  Vascular: Denies claudication, resting pain, tingling numbness or weakness in any part of the body.  Musculoskeletal: Denies Joint tenderness or pain, denies stiffness in joints, denies fever or weight loss, or skin rashes.    Current Labs  [unfilled]  reviewed  Current Radiology    Current Meds    Current Facility-Administered Medications:   •  acetaminophen (TYLENOL) tablet 650 mg, 650 mg, Oral, Q4H PRN, Nestor Berman MD, 650 mg at 03/15/19 1185  •  amLODIPine (NORVASC) tablet 5 mg, 5 mg, Oral, Q24H, Nestor Berman MD, 5 mg at 03/16/19  0802  •  bisacodyl (DULCOLAX) EC tablet 5 mg, 5 mg, Oral, Daily PRN, Nestor Berman MD  •  citalopram (CeleXA) tablet 20 mg, 20 mg, Oral, Daily, Nestor Berman MD, 20 mg at 03/16/19 0802  •  dexamethasone 0.5 MG/5ML solution 1 mg, 1 mg, Oral, Q8H, Nestor Berman MD, 1 mg at 03/16/19 1322  •  famotidine (PEPCID) tablet 40 mg, 40 mg, Oral, Daily, Nestor Berman MD, 40 mg at 03/16/19 0802  •  LORazepam (ATIVAN) tablet 1 mg, 1 mg, Oral, Q6H PRN, Nestor Berman MD  •  melatonin tablet 5 mg, 5 mg, Oral, Nightly PRN, Nestor Berman MD, 5 mg at 03/15/19 2241  •  multivitamin with minerals 1 tablet, 1 tablet, Oral, Daily, Nestor Berman MD, 1 tablet at 03/16/19 0802  •  neomycin-bacitracin-polymyxin (NEOSPORIN) ointment, , Topical, Q12H PRN, Nestor Berman MD  •  ondansetron (ZOFRAN) injection 4 mg, 4 mg, Intravenous, Q6H PRN, Nestor Berman MD  •  sodium chloride 0.9 % flush 3 mL, 3 mL, Intravenous, Q12H, Nestor Berman MD, 3 mL at 03/16/19 0802  •  sodium chloride 0.9 % flush 3-10 mL, 3-10 mL, Intravenous, PRN, Nestor Berman MD  •  sodium chloride 0.9 % infusion, 75 mL/hr, Intravenous, Continuous, Nestor Berman MD, Last Rate: 75 mL/hr at 03/15/19 1849, 75 mL/hr at 03/15/19 1849  •  traMADol (ULTRAM) tablet 50 mg, 50 mg, Oral, Q6H PRN, Nestor Berman MD, 50 mg at 03/15/19 2240              Patient Active Problem List   Diagnosis   • Dizziness   • Lightheadedness   • Falls frequently   • Dehydration   • Lichen planus   • Essential hypertension   • Anxiety disorder   • Dementia without behavioral disturbance   • Moderate protein-calorie malnutrition (CMS/HCC)   • Traumatic hematoma of forehead   • Heart murmur   falls , dizziness , old age , need physical therapy , gait training and evaluation by rehab physcian  Danielle Stroud MD [facility], FASN.  03/16/19  3:33 PM

## 2019-03-17 PROBLEM — I42.9 CARDIOMYOPATHY (HCC): Status: ACTIVE | Noted: 2019-01-01

## 2019-03-17 PROBLEM — I67.9 CEREBROVASCULAR DISEASE: Status: ACTIVE | Noted: 2019-01-01

## 2019-03-17 PROBLEM — Z74.09 IMPAIRED FUNCTIONAL MOBILITY, BALANCE, GAIT, AND ENDURANCE: Status: ACTIVE | Noted: 2019-01-01

## 2019-03-17 NOTE — PLAN OF CARE
Problem: Patient Care Overview  Goal: Plan of Care Review  Outcome: Ongoing (interventions implemented as appropriate)   03/17/19 3966   Coping/Psychosocial   Plan of Care Reviewed With patient;family   Plan of Care Review   Progress improving   OTHER   Outcome Summary Pt demonstrates increased ambulation distance today, going 130ft with Minx1 and use of 3-wheeled rollator in room. Pt demonstrates slight unsteadiness, mainly due to a posterior lean and improper use of her walker. Verbal/tactile cues provided to improve upright posture, but she is inconsistent in maintaining throughout. Ended with seated therex at EOB x 10 reps. Pt is progressing well and will benefit from continuing skilled PT to assist in improving her independence with functional mobility.

## 2019-03-17 NOTE — PLAN OF CARE
Problem: Patient Care Overview  Goal: Plan of Care Review  Outcome: Ongoing (interventions implemented as appropriate)   03/17/19 0600   Coping/Psychosocial   Plan of Care Reviewed With patient   Plan of Care Review   Progress no change   OTHER   Outcome Summary Pt has new IV in right arm with NS @75. Assist x 1 to BSC frequently. Melatonin given this shift. Has not helped much with sleep.

## 2019-03-17 NOTE — PLAN OF CARE
Problem: Patient Care Overview  Goal: Plan of Care Review  Outcome: Ongoing (interventions implemented as appropriate)   03/17/19 2253   Coping/Psychosocial   Plan of Care Reviewed With patient;daughter;family   Plan of Care Review   Progress improving   OTHER   Outcome Summary Patient working w/ PT, multiple changes in po medications this shift, IV now SL, encourage PO intake of Boost, VSS, conitinue to monitor carefully       Problem: Fall Risk (Adult)  Goal: Absence of Fall  Outcome: Ongoing (interventions implemented as appropriate)      Problem: Skin Injury Risk (Adult)  Goal: Skin Health and Integrity  Outcome: Ongoing (interventions implemented as appropriate)      Problem: Nutrition, Imbalanced: Inadequate Oral Intake (Adult)  Goal: Improved Oral Intake  Outcome: Ongoing (interventions implemented as appropriate)    Goal: Prevent Further Weight Loss  Outcome: Ongoing (interventions implemented as appropriate)

## 2019-03-17 NOTE — PROGRESS NOTES
Discharge Planning Assessment  [facility]     Patient Name: Belinda Vega  MRN: [id number]  Today's Date: 3/17/2019    Admit Date: 3/15/2019    Discharge Needs Assessment     Row Name 03/17/19 1343       Living Environment    Lives With  alone    Current Living Arrangements  home/apartment/condo 2 story house with Bed and Bath on main floor with 2 steps to enter house    Primary Care Provided by  other (see comments) private pay assistance    Provides Primary Care For  no one, unable/limited ability to care for self    Family Caregiver if Needed  none    Quality of Family Relationships  helpful;involved;supportive    Able to Return to Prior Arrangements  no       Resource/Environmental Concerns    Resource/Environmental Concerns  none    Transportation Concerns  car, none       Transition Planning    Patient/Family Anticipates Transition to  inpatient rehabilitation facility    Patient/Family Anticipated Services at Transition  rehabilitation services    Transportation Anticipated  family or friend will provide       Discharge Needs Assessment    Readmission Within the Last 30 Days  no previous admission in last 30 days    Concerns to be Addressed  basic needs    Equipment Currently Used at Home  rollator;shower chair    Anticipated Changes Related to Illness  inability to care for self    Equipment Needed After Discharge  none    Outpatient/Agency/Support Group Needs  skilled nursing facility    Discharge Facility/Level of Care Needs  nursing facility, skilled    Offered/Gave Vendor List  no    Current Discharge Risk  lives alone        Discharge Plan     Row Name 03/17/19 1345       Plan    Plan  SNF and transition to LTC- await choices from sonArley    Patient/Family in Agreement with Plan  yes    Plan Comments  Introduced self and explained role of CCP, IMM obtained from son and facesheet verified with Arley blank via phone at [phone] and states he is patient's emergency contact.  Patient did give CCP  permission to speak with son about discharge planning.  Patient states her PCP is Marlen Berman MD and she uses [facility] and denies any issues with affording or obtaining medications.  Patient states she lives alone in a 2 story house and her bedroom and bathroom is on 1st floor and she has 2 steps to enter house.  Patient states she currently has a rollator walker and shower chair and denies DME needs at discharge.  Patient states she has private pay assistance daily from 8am to 12noon that assist her with medications, cooking, housekeeping and transportation and denies ever using home health or going to SNF.  Spoke with son, Arley Vega via phone at [phone] and he states the plan is for patient to go to SNF and transition to LTC at discharge and is not sure which facilities they want referrals made yet as they are looking at the [place], [place] area and have and asks that CCP call him on Monday afternoon for SNF choices.  CCP will follow and assist as needed.... Olga MartinezRN,CCP         Destination      No service coordination in this encounter.      Durable Medical Equipment      No service coordination in this encounter.      Dialysis/Infusion      No service coordination in this encounter.      Home Medical Care      No service coordination in this encounter.      Community Resources      No service coordination in this encounter.          Demographic Summary     Row Name 03/17/19 1330       General Information    Admission Type  inpatient    Arrived From  home    Required Notices Provided  Important Message from Medicare    Referral Source  nursing    Reason for Consult  discharge planning    Preferred Language  English     Used During This Interaction  no       Contact Information    Permission Granted to Share Info With  ;family/designee Arley Vega(son)     Contact Information Obtained for          Functional Status     Row Name 03/17/19 0016        Functional Status    Usual Activity Tolerance  fair    Current Activity Tolerance  poor       Functional Status, IADL    Medications  assistive person    Meal Preparation  assistive person    Housekeeping  assistive person    Laundry  assistive person    Shopping  assistive person       Mental Status    General Appearance WDL  WDL       Mental Status Summary    Recent Changes in Mental Status/Cognitive Functioning  no changes        Psychosocial    No documentation.       Abuse/Neglect    No documentation.       Legal    No documentation.       Substance Abuse    No documentation.       Patient Forms    No documentation.           Olga Martinez RN

## 2019-03-17 NOTE — PROGRESS NOTES
:the pt. Feeling better     Vital Signs  Vitals:    03/17/19 1448   BP: 126/60   Pulse: 69   Resp: 16   Temp: 98.3 °F (36.8 °C)   SpO2:      No intake/output data recorded.  I/O last 3 completed shifts:  In: 1200 [P.O.:200; I.V.:1000]  Out: 500 [Urine:500]      Physical Exam:    General: in nad   HEENT:  Normo cephalic, Atraumatic, EOMI, PERRLA, NO icterus,  Neck:  Supple, No JVD, No Carotid artery bruit, No lymphadenopathy  Chest: Clear to auscultation bilaterally,   Cardiovascular: Regular rate and rhythm. Positive S1 & S2, No rub, murmur or gallop.  Abdominal: Soft, non tender, no palpable organomegaly, no abdominal bruit, positive bowel sounds  Musculoskeletal: No joint tenderness or swelling, good range of motion, Adequate muscle strength on both sides, no cyanosis, clubbing or edema on lower extremities.  Neuro: Alert oriented x3, CN1 - 12 intact, No focal sensory or motor deficit.      Review of Systems:   CNS: Denied headaches, blurred vision, tingling or numbness in any part of body. No weakness or any impaired speech.  CVS: No chest pain or shortness of breath, No orthopnea or PND or exertional dyspnea,  Pulmonary: Denies shortness of breath, coughs, hematemesis, night sweats or weight loss.  GI: Denies diarrhea, nausea, vomiting. Denies weight loss, hematemesis, melena.  : Denies dysuria, frequency or hesitancy of urination  Vascular: Denies claudication, resting pain, tingling numbness or weakness in any part of the body.  Musculoskeletal: Denies Joint tenderness or pain, denies stiffness in joints, denies fever or weight loss, or skin rashes.    Current Labs  [unfilled]    Current Radiology    Current Meds    Current Facility-Administered Medications:   •  acetaminophen (TYLENOL) tablet 650 mg, 650 mg, Oral, Q4H PRN, Nestor Berman MD, 650 mg at 03/15/19 6819  •  bisacodyl (DULCOLAX) EC tablet 5 mg, 5 mg, Oral, Daily PRN, Nestor Berman MD  •  citalopram (CeleXA) tablet 20 mg, 20  mg, Oral, Daily, Nestro Berman MD, 20 mg at 03/17/19 0855  •  dexamethasone 0.5 MG/5ML solution 1 mg, 1 mg, Oral, Q8H, Nestor Berman MD, 1 mg at 03/17/19 1453  •  famotidine (PEPCID) tablet 40 mg, 40 mg, Oral, Daily, Nestor Berman MD, 40 mg at 03/17/19 0855  •  lidocaine viscous (XYLOCAINE) 2 % mouth solution 5 mL, 5 mL, Mouth/Throat, TID AC, Nestor Berman MD, 5 mL at 03/17/19 1839  •  LORazepam (ATIVAN) tablet 1 mg, 1 mg, Oral, Q12H, Nestor Berman MD  •  losartan (COZAAR) tablet 50 mg, 50 mg, Oral, Daily, Maurilio Wiggins MD, 50 mg at 03/17/19 1450  •  melatonin tablet 5 mg, 5 mg, Oral, Nightly, Nestor Berman MD  •  metoprolol succinate XL (TOPROL-XL) 24 hr tablet 25 mg, 25 mg, Oral, Q24H, Compa Villalba MD, 25 mg at 03/17/19 1041  •  multivitamin with minerals 1 tablet, 1 tablet, Oral, Daily, Nestor Berman MD, 1 tablet at 03/17/19 0855  •  neomycin-bacitracin-polymyxin (NEOSPORIN) ointment, , Topical, Q12H PRN, Nestor Berman MD  •  ondansetron (ZOFRAN) injection 4 mg, 4 mg, Intravenous, Q6H PRN, Nestor Berman MD  •  potassium chloride (MICRO-K) CR capsule 20 mEq, 20 mEq, Oral, Daily, Frank Stroud MD, 20 mEq at 03/17/19 0855  •  sodium chloride 0.9 % flush 3 mL, 3 mL, Intravenous, Q12H, Nestor Berman MD, 3 mL at 03/17/19 0856  •  sodium chloride 0.9 % flush 3-10 mL, 3-10 mL, Intravenous, PRN, Nestor Berman MD  •  spironolactone (ALDACTONE) tablet 25 mg, 25 mg, Oral, Daily, Maurilio Wiggins MD, 25 mg at 03/17/19 1451  •  traMADol (ULTRAM) tablet 50 mg, 50 mg, Oral, Q6H PRN, Nestor Berman MD, 50 mg at 03/15/19 2240              Patient Active Problem List   Diagnosis   • Dizziness   • Lightheadedness   • Falls frequently   • Dehydration   • Lichen planus   • Essential hypertension   • Anxiety disorder   • Dementia without behavioral disturbance   • Moderate protein-calorie malnutrition (CMS/HCC)   • Traumatic  hematoma of forehead   • Heart murmur   • Cardiomyopathy (CMS/HCC)   • Cerebrovascular disease   • Impaired functional mobility, balance, gait, and endurance   fall , b.p stable , will benefit from inpatient rehab , with gait training         Frank Stroud MD FACP, FASN.  03/17/19  6:50 PM

## 2019-03-17 NOTE — THERAPY TREATMENT NOTE
Acute Care - Physical Therapy Treatment Note  [facility]     Patient Name: Belinda Vega  : 1928  MRN: [id number]  Today's Date: 3/17/2019     Date of Referral to PT: 19  Referring Physician: Dr. Berman    Admit Date: 3/15/2019    Visit Dx:    ICD-10-CM ICD-9-CM   1. Impaired functional mobility, balance, gait, and endurance Z74.09 V49.89     Patient Active Problem List   Diagnosis   • Dizziness   • Lightheadedness   • Falls frequently   • Dehydration   • Lichen planus   • Essential hypertension   • Anxiety disorder   • Dementia without behavioral disturbance   • Moderate protein-calorie malnutrition (CMS/HCC)   • Traumatic hematoma of forehead   • Heart murmur   • Cardiomyopathy (CMS/HCC)   • Cerebrovascular disease   • Impaired functional mobility, balance, gait, and endurance       Therapy Treatment    Rehabilitation Treatment Summary     Row Name 19 1517             Treatment Time/Intention    Discipline  physical therapist  -DO      Document Type  therapy note (daily note)  -DO      Subjective Information  complains of;weakness;fatigue  -DO      Mode of Treatment  physical therapy  -DO      Patient/Family Observations  Pt supine in bed in no acute distress; family present.   -DO      Total Minutes, Physical Therapy Treatment  15  -DO      Therapy Frequency (PT Clinical Impression)  daily  -DO      Patient Effort  good  -DO      Existing Precautions/Restrictions  fall  -DO      Patient Response to Treatment  Pt tolerated session well with no adverse s/s noted; VSS throughout.   -DO      Recorded by [DO] Claude Womack, PT 19 1546      Row Name 19 1517             Cognitive Assessment/Intervention- PT/OT    Orientation Status (Cognition)  oriented x 3  -DO      Follows Commands (Cognition)  follows one step commands;over 90% accuracy;physical/tactile prompts required;repetition of directions required;verbal cues/prompting required  -DO      Safety Deficit (Cognitive)  mild  deficit;awareness of need for assistance;insight into deficits/self awareness  -DO      Recorded by [DO] Claude Womack, PT 03/17/19 1546      Row Name 03/17/19 1517             Safety Issues, Functional Mobility    Safety Issues Affecting Function (Mobility)  ability to follow commands;awareness of need for assistance;insight into deficits/self awareness;positioning of assistive device  -DO      Impairments Affecting Function (Mobility)  balance;endurance/activity tolerance;strength  -DO      Recorded by [DO] Claude Womack, PT 03/17/19 1546      Row Name 03/17/19 1517             Bed Mobility Assessment/Treatment    Supine-Sit [place] (Bed Mobility)  minimum assist (75% patient effort);verbal cues;nonverbal cues (demo/gesture)  -DO      Sit-Supine [place] (Bed Mobility)  minimum assist (75% patient effort);verbal cues;nonverbal cues (demo/gesture)  -DO      Recorded by [DO] Claude Womack, PT 03/17/19 1546      Row Name 03/17/19 1517             Sit-Stand Transfer    Sit-Stand [place] (Transfers)  minimum assist (75% patient effort);moderate assist (50% patient effort);verbal cues;nonverbal cues (demo/gesture)  -DO      Assistive Device (Sit-Stand Transfers)  -- rollator  -DO      Recorded by [DO] Claude Womack, PT 03/17/19 1546      Row Name 03/17/19 1517             Stand-Sit Transfer    Stand-Sit [place] (Transfers)  minimum assist (75% patient effort);moderate assist (50% patient effort);verbal cues;nonverbal cues (demo/gesture)  -DO      Assistive Device (Stand-Sit Transfers)  -- rollator  -DO      Recorded by [DO] Claude Womack, PT 03/17/19 1546      Row Name 03/17/19 1517             Gait/Stairs Assessment/Training    [place] Level (Gait)  minimum assist (75% patient effort);verbal cues;nonverbal cues (demo/gesture)  -DO      Assistive Device (Gait)  -- rollator   -DO      Distance in Feet (Gait)  130  -DO      Pattern (Gait)  step-through  -DO      Deviations/Abnormal  Patterns (Gait)  base of support, narrow;michelle decreased;gait speed decreased;stride length decreased  -DO      Bilateral Gait Deviations  forward flexed posture;weight shift ability decreased  -DO      Recorded by [DO] Claude Womack, PT 03/17/19 1546      Row Name 03/17/19 1517             Positioning and Restraints    Pre-Treatment Position  in bed  -DO      Post Treatment Position  bed  -DO      In Bed  supine;call light within reach;encouraged to call for assist;exit alarm on;with family/caregiver;SCD pump applied  -DO      Recorded by [DO] Claude Womack, PT 03/17/19 1546      Row Name 03/17/19 1517             Pain Scale: Numbers Pre/Post-Treatment    Pain Scale: Numbers, Pretreatment  0/10 - no pain  -DO      Pain Scale: Numbers, Post-Treatment  0/10 - no pain  -DO      Recorded by [DO] Claude Womack, PT 03/17/19 1546      Row Name                Wound 03/15/19 2051 Left anterior;lower;proximal leg surgical    Wound - Properties Group Date first assessed: 03/15/19 [DB] Time first assessed: 2051 [DB] Side: Left [DB] Orientation: anterior;lower;proximal [DB] Location: leg [DB] Type: surgical [DB] Stage, Pressure Injury: other (see comments) [DB], skin cancer removal  Recorded by:  [DB] Shamir Blancas RN 03/16/19 0110    Row Name 03/17/19 1517             Outcome Summary/Treatment Plan (PT)    Daily Summary of Progress (PT)  progress toward functional goals as expected  -DO      Recorded by [DO] Claude Womack, PT 03/17/19 1546        User Key  (r) = Recorded By, (t) = Taken By, (c) = Cosigned By    Initials Name Effective Dates Discipline    DB Shamir Blancas RN 06/16/16 -  Nurse    DO Claude Womack, PT 03/07/18 -  PT          Wound 03/15/19 2051 Left anterior;lower;proximal leg surgical (Active)   Closure Open to air 3/17/2019  3:03 PM   Drainage Amount none 3/17/2019  3:03 PM           Physical Therapy Education     Title: PT OT SLP Therapies (Done)     Topic: Physical Therapy (Done)     Point:  Mobility training (Done)     Learning Progress Summary           Patient Acceptance, E, VU,DU by DO at 3/17/2019  3:46 PM    Acceptance, E, VU,DU,NR by DO at 3/16/2019  2:59 PM   Family Acceptance, E, VU,DU by DO at 3/17/2019  3:46 PM    Acceptance, E, VU,DU,NR by DO at 3/16/2019  2:59 PM                   Point: Home exercise program (Done)     Learning Progress Summary           Patient Acceptance, E, VU,DU by DO at 3/17/2019  3:46 PM    Acceptance, E, VU,DU,NR by DO at 3/16/2019  2:59 PM   Family Acceptance, E, VU,DU by DO at 3/17/2019  3:46 PM    Acceptance, E, VU,DU,NR by DO at 3/16/2019  2:59 PM                   Point: Body mechanics (Done)     Learning Progress Summary           Patient Acceptance, E, VU,DU by DO at 3/17/2019  3:46 PM    Acceptance, E, VU,DU,NR by DO at 3/16/2019  2:59 PM   Family Acceptance, E, VU,DU by DO at 3/17/2019  3:46 PM    Acceptance, E, VU,DU,NR by DO at 3/16/2019  2:59 PM                   Point: Precautions (Done)     Learning Progress Summary           Patient Acceptance, E, VU,DU by DO at 3/17/2019  3:46 PM    Acceptance, E, VU,DU,NR by DO at 3/16/2019  2:59 PM   Family Acceptance, E, VU,DU by DO at 3/17/2019  3:46 PM    Acceptance, E, VU,DU,NR by DO at 3/16/2019  2:59 PM                               User Key     Initials Effective Dates Name Provider Type Discipline    DO 03/07/18 -  Claude Womack, PT Physical Therapist PT                PT Recommendation and Plan  Anticipated Discharge Disposition (PT): skilled nursing facility(pending progress and assistance at home. )  Planned Therapy Interventions (PT Eval): bed mobility training, gait training, strengthening, transfer training  Therapy Frequency (PT Clinical Impression): daily  Outcome Summary/Treatment Plan (PT)  Daily Summary of Progress (PT): progress toward functional goals as expected  Anticipated Equipment Needs at Discharge (PT): (Pt already has 3-wheeled RWx.)  Anticipated Discharge Disposition (PT): skilled  nursing facility(pending progress and assistance at home. )  Plan of Care Reviewed With: patient, family  Progress: improving  Outcome Summary: Pt demonstrates increased ambulation distance today, going 130ft with Minx1 and use of 3-wheeled rollator in room. Pt demonstrates slight unsteadiness, mainly due to a posterior lean and improper use of her walker. Verbal/tactile cues provided to improve upright posture, but she is inconsistent in maintaining throughout. Ended with seated therex at EOB x 10 reps. Pt is progressing well and will benefit from continuing skilled PT to assist in improving her independence with functional mobility.   Outcome Measures     Row Name 03/17/19 1500 03/16/19 1500          How much help from another person do you currently need...    Turning from your back to your side while in flat bed without using bedrails?  4  -DO  4  -DO     Moving from lying on back to sitting on the side of a flat bed without bedrails?  3  -DO  3  -DO     Moving to and from a bed to a chair (including a wheelchair)?  3  -DO  3  -DO     Standing up from a chair using your arms (e.g., wheelchair, bedside chair)?  3  -DO  3  -DO     Climbing 3-5 steps with a railing?  2  -DO  2  -DO     To walk in hospital room?  3  -DO  3  -DO     AM-PAC 6 Clicks Score  18  -DO  18  -DO        Functional Assessment    Outcome Measure Options  AM-PAC 6 Clicks Basic Mobility (PT)  -DO  AM-PAC 6 Clicks Basic Mobility (PT)  -DO       User Key  (r) = Recorded By, (t) = Taken By, (c) = Cosigned By    Initials Name Provider Type    DO Claude Womack, PT Physical Therapist         Time Calculation:   PT Charges     Row Name 03/17/19 8808             Time Calculation    Start Time  1517  -DO      Stop Time  1532  -DO      Time Calculation (min)  15 min  -DO      PT Received On  03/17/19  -DO      PT - Next Appointment  03/18/19  -DO        User Key  (r) = Recorded By, (t) = Taken By, (c) = Cosigned By    Initials Name Provider Type    DO  Claude Womack, PT Physical Therapist        Therapy Suggested Charges     Code   Minutes Charges    None           Therapy Charges for Today     Code Description Service Date Service Provider Modifiers Qty    24630670310 HC PT EVAL MOD COMPLEXITY 2 3/16/2019 Claude Womack, PT GP 1    23705489314 HC PT THER PROC EA 15 MIN 3/16/2019 Claude Womack, PT GP 1    51391968110 HC PT THER PROC EA 15 MIN 3/17/2019 Claude Womack, PT GP 1          PT G-Codes  Outcome Measure Options: AM-PAC 6 Clicks Basic Mobility (PT)  AM-PAC 6 Clicks Score: 18    Claude Womack, PT  3/17/2019

## 2019-03-17 NOTE — CONSULTS
Date of Hospital Visit: 19  Encounter Provider: Maurilio Wiggins MD  Place of Service: [facility] CARDIOLOGY  Patient Name: Belinda Vega  :1928  [id number]  Referral Provider: Nestor Berman MD    Chief complaint: weakness    History of Present Illness:  Ms. Vega is a 89 year old woman with history dementia, lichen planus on CellCept, hypertension, and anxiety. She was hospitalized in 2012 at [facility]. Echocardiogram showed EF 55-60% and no significant valvular disease. Holter monitor showed sinus rhythm with frequent PVCs and occasional PACs.     She has lost nearly 100lbs in a year due to decreased intake. She was admitted per Dr. Baez Daily for increased weakness with falls; she denies syncope. She is receiving IV fluids. An echocardiogram showed EF 32%, global LV hypokinesis, grade I diastolic dysfunction, severely dilated LV, severe AV calcification, mild aortic regurgitation, mild aortic stenosis with valve area 1.2cm2, severe MAC, severe bileaflet thickening of mitral valve, moderate mitral stenosis with mean gradient 0.7mmhg, mild mitral regurgitation, mild TR, and RVSP 35-45mmHg. We've been asked to evaluate for these abnormal echo results. She is sinus rhythm on monitor; an EKG has been ordered.    I have reviewed the above HPI.  This is a very elderly, frail woman who recently had a bad fall, trip and fall, hit her head, has a lot of ecchymosis on her head.  She has been put in the hospital due to weakness.  She does not complain of PND, orthopnea, edema, chest pain, shortness of breath, palpitations.  She never had any heart issues that she knows of.  An echocardiogram was done and we are asked to see her.        Cardiac Testing:  Echocardiogram 3/16/19  Interpretation Summary     · Calculated EF = 32%. Estimated EF was in agreement with the calculated EF. Estimated EF appears to be in the range of 31 - 35%. Estimated EF = 32%. Normal  left ventricular cavity size and wall thickness noted. There is left ventricular global hypokinesis noted. Left ventricular diastolic dysfunction is noted (grade I a w/high LAP) consistent with impaired relaxation.  · Left atrial volume is severely increased.  · Right atrial cavity size is moderately dilated  · The aortic valve is abnormal in structure. There is severe calcification of the aortic valve.Mild aortic valve regurgitation is present. Mild aortic valve stenosis is present. There is mild low output low gradient aortic valve stenosis Aortic valve mean pressure gradient is 7.0 mmHg. Aortic valve area is 1.2 cm2.  · Severe MAC is present. There is severe bileaflet mitral valve thickening present. Mild mitral valve regurgitation is present. Moderate mitral valve stenosis is present. The mitral valve mean gradient is 7.0 mmHg. The mitral valve peak gradient is 16.3 mmHg. The mitral valve area by continuity equation is 1.1 cm².  · Mild tricuspid valve regurgitation is present. Estimated right ventricular systolic pressure from tricuspid regurgitation is mildly elevated (35-45 mmHg). Calculated right ventricular systolic pressure from tricuspid regurgitation is 44 mmHg.       Holter Monitor 12/12/12 [facility]        Echocardiogram 12/14/12 [facility]      History reviewed. No pertinent past medical history.    History reviewed. No pertinent surgical history.    Medications Prior to Admission   Medication Sig Dispense Refill Last Dose   • citalopram (CeleXA) 20 MG tablet Take 20 mg by mouth Daily.      • dexamethasone 0.5 MG/5ML solution Take  by mouth Every 6 (Six) Hours As Needed.      • LORazepam (ATIVAN) 1 MG tablet Take 1 mg by mouth Every 6 (Six) Hours As Needed for Anxiety.      • terazosin (HYTRIN) 1 MG capsule Take 1 mg by mouth Every Night.      • traMADol (ULTRAM) 50 MG tablet Take 50 mg by mouth Every 6 (Six) Hours As Needed for Moderate Pain .      • ZOLPIDEM TARTRATE PO Take 10 mg by  mouth Every Night.          Current Meds  Scheduled Meds:    citalopram 20 mg Oral Daily   dexamethasone 1 mg Oral Q8H   famotidine 40 mg Oral Daily   losartan 50 mg Oral Daily   metoprolol succinate XL 25 mg Oral Q24H   multivitamin with minerals 1 tablet Oral Daily   potassium chloride 20 mEq Oral Daily   sodium chloride 3 mL Intravenous Q12H   spironolactone 25 mg Oral Daily     Continuous Infusions:   PRN Meds:.•  acetaminophen  •  bisacodyl  •  LORazepam  •  melatonin  •  neomycin-bacitracin-polymyxin  •  ondansetron  •  sodium chloride  •  traMADol    Allergies as of 03/15/2019   • (No Known Allergies)       Social History     Socioeconomic History   • Marital status:      Spouse name: Not on file   • Number of children: Not on file   • Years of education: Not on file   • Highest education level: Not on file   Social Needs   • Financial resource strain: Not on file   • Food insecurity - worry: Not on file   • Food insecurity - inability: Not on file   • Transportation needs - medical: Not on file   • Transportation needs - non-medical: Not on file   Occupational History   • Not on file   Tobacco Use   • Smoking status: Never Smoker   • Smokeless tobacco: Never Used   Substance and Sexual Activity   • Alcohol use: No     Frequency: Never   • Drug use: No   • Sexual activity: Defer   Other Topics Concern   • Not on file   Social History Narrative   • Not on file       Family History   Problem Relation Age of Onset   • Hypertension Other        REVIEW OF SYSTEMS:   ROS was performed and is negative except as outlined in HPI     REVIEW OF SYSTEMS:   CONSTITUTIONAL: No weight loss, fever, chills, weakness or fatigue.   HEENT: Eyes: No visual loss, blurred vision, double vision or yellow sclerae. Ears, Nose, Throat: No hearing loss, sneezing, congestion, runny nose or sore throat.   SKIN: No rash or itching.     RESPIRATORY: No shortness of breath, hemoptysis, cough or sputum.   GASTROINTESTINAL: No anorexia,  "nausea, vomiting or diarrhea. No abdominal pain, bright red blood per rectum or melena.  GENITOURINARY: No burning on urination, hematuria or increased frequency.  NEUROLOGICAL: No headache, dizziness, syncope, paralysis, ataxia, numbness or tingling in the extremities. No change in bowel or bladder control.   MUSCULOSKELETAL: No muscle, back pain, joint pain or stiffness.   HEMATOLOGIC: No anemia, bleeding or bruising.   LYMPHATICS: No enlarged nodes. No history of splenectomy.   PSYCHIATRIC: No history of depression, anxiety, hallucinations.   ENDOCRINOLOGIC: No reports of sweating, cold or heat intolerance. No polyuria or polydipsia.        Objective:   Temp:  [97.8 °F (36.6 °C)-98.6 °F (37 °C)] 97.8 °F (36.6 °C)  Heart Rate:  [74-89] 79  Resp:  [16] 16  BP: (136-165)/(64-84) 158/71  Body mass index is 20.2 kg/m².  Flowsheet Rows      First Filed Value   Admission Height  149.9 cm (59\") Documented at 03/15/2019 1700   Admission Weight  45.4 kg (100 lb) Documented at 03/16/2019 1117        Vitals:    03/17/19 0821   BP: 158/71   Pulse: 79   Resp:    Temp:    SpO2:        Head:    Normocephalic, without obvious abnormality, large ecchymotic area on the left side of the head   Eyes:            Lids and lashes normal, conjunctivae and sclerae normal, no   icterus, no pallor   Ears:    Ears appear intact with no abnormalities noted   Throat:   No oral lesions, dentition good   Neck:   No adenopathy, supple, trachea midline, no thyromegaly, no   carotid bruit, no JVD   Lungs:     Breath sounds are equal and clear to auscultation    Heart:    Normal S1 and S2, RRR, 2 out of 6 mid peaking systolic ejection M/G/R   Abdomen:     Normal bowel sounds, no masses, no organomegaly, soft        non-tender, non-distended, no guarding   Extremities:   Moves all extremities well, no edema, no cyanosis, no redness   Pulses:   Pulses palpable and equal bilaterally.    Skin:  Psychiatric:   No bleeding, bruising or rash    Awake, " alert and oriented x 3, normal mood and affect           Telemetry:          I personally viewed and interpreted the patient's EKG/Telemetry data    Assessment:  Active Hospital Problems    Diagnosis  POA   • **Dizziness [R42]  Yes   • Cardiomyopathy (CMS/HCC) [I42.9]  Unknown   • Cerebrovascular disease [I67.9]  Unknown   • Impaired functional mobility, balance, gait, and endurance [Z74.09]  Yes   • Moderate protein-calorie malnutrition (CMS/HCC) [E44.0]  Yes   • Traumatic hematoma of forehead [S00.83XA]  Unknown   • Heart murmur [R01.1]  Unknown   • Lightheadedness [R42]  Yes   • Falls frequently [R29.6]  Not Applicable   • Dehydration [E86.0]  Yes   • Lichen planus [L43.9]  Yes   • Essential hypertension [I10]  Yes   • Dementia without behavioral disturbance [F03.90]  Yes      Resolved Hospital Problems   No resolved problems to display.       Plan: She is a moderate dilated cardiomyopathy has left bundle branch block mild to moderate aortic stenosis while her mitral valve has a lot of mitral annular calcification structurally it still works pretty well.  Left bundle branch block.  At this point I am going to adjust her medicines a little bit and put her on an ARB as well as Aldactone along with her long-acting beta-blocker.  I stopped her Norvasc.  Given her age and her overall functional status I think will be conservative in our approach with her.    Bigger issue is what is the future hold for her as far as advanced directives and goals of care.  She obviously has falls, she has had significant weight loss, she lives alone and has some element of dementia.  To me it appears her prognosis is very guarded    Maurilio Wiggins MD  03/17/19  1:47 PM.

## 2019-03-17 NOTE — PROGRESS NOTES
Name: Belinda Vega ADMIT: 3/15/2019   : 1928  PCP: Marlen Berman MD    MRN: [id number] LOS: 1 days   AGE/SEX: 89 y.o. female  ROOM: [facility]   Subjective   Cc- weakness    Still profoundly weak  Difficulty even with transfers  Had echo yesterday  CT yesterday  Has been on IVFs    ROS  No f/c  No n/v  No cp/palp  No soa/cough    Objective   Vital Signs  Temp:  [97.8 °F (36.6 °C)-98.6 °F (37 °C)] 97.8 °F (36.6 °C)  Heart Rate:  [74-89] 79  Resp:  [16] 16  BP: (136-165)/(64-84) 158/71  SpO2:  [95 %-97 %] 96 %  on   ;   Device (Oxygen Therapy): room air  Body mass index is 20.2 kg/m².    Physical Exam   Constitutional: She is oriented to person, place, and time.   Elderly, chronically ill   HENT:   Head: Normocephalic and atraumatic.   Eyes: Conjunctivae are normal. No scleral icterus.   Neck: Neck supple. No tracheal deviation present.   Cardiovascular: Normal rate and regular rhythm.   Murmur heard.  Pulmonary/Chest: Effort normal and breath sounds normal.   Abdominal: Soft. There is no tenderness. There is no guarding.   Genitourinary:   Genitourinary Comments: Deferred    Musculoskeletal:   Muscle atrophy   Neurological: She is alert and oriented to person, place, and time. She exhibits normal muscle tone.   Skin: Skin is warm and dry.   Psychiatric: She has a normal mood and affect. Her behavior is normal.       Results Review:       I reviewed the patient's new clinical results.  Results from last 7 days   Lab Units 03/15/19  1539   WBC 10*3/mm3 8.01   HEMOGLOBIN g/dL 11.3*   PLATELETS 10*3/mm3 195     Results from last 7 days   Lab Units 19  0443 19  0600 03/15/19  1539   SODIUM mmol/L 133* 133* 134*   POTASSIUM mmol/L 3.6 3.5 4.4   CHLORIDE mmol/L 93* 93* 94*   CO2 mmol/L 27.0 27.7 29.9*   BUN mg/dL 11 9 13   CREATININE mg/dL 0.40* 0.37* 0.42*   GLUCOSE mg/dL 103* 118* 91   Estimated Creatinine Clearance: 32.8 mL/min (A) (by C-G formula based on SCr of 0.4 mg/dL (L)).  Results from last  7 days   Lab Units 03/15/19  1539   ALBUMIN g/dL 3.50   BILIRUBIN mg/dL 0.3   ALK PHOS U/L 84   AST (SGOT) U/L 15   ALT (SGPT) U/L 7     Results from last 7 days   Lab Units 03/17/19  0443 03/16/19  0600 03/15/19  1539   CALCIUM mg/dL 8.5 8.6 9.2   ALBUMIN g/dL  --   --  3.50   MAGNESIUM mg/dL 2.0 2.0 2.2   PHOSPHORUS mg/dL  --   --  3.6         Radiology(recent) Ct Head Without Contrast    Result Date: 3/16/2019  Evidence of mild small vessel chronic ischemic change as described. Small chronic right cerebellar and basal ganglia infarcts. Large hematoma in the scalp overlying the left frontal bone. No acute intracranial abnormality is identified.  The patient's nurse was informed that a completed corrected report was available for review on the electronic medical record system on 3/16/2019 at 6:20 PM  This report was finalized on 3/16/2019 6:22 PM by Dr. Jean Paul Ortiz M.D.        2D ECHO 3/16/19  · Calculated EF = 32%. Estimated EF was in agreement with the calculated EF. Estimated EF appears to be in the range of 31 - 35%. Estimated EF = 32%. Normal left ventricular cavity size and wall thickness noted. There is left ventricular global hypokinesis noted. Left ventricular diastolic dysfunction is noted (grade I a w/high LAP) consistent with impaired relaxation.  · Left atrial volume is severely increased.  · Right atrial cavity size is moderately dilated  · The aortic valve is abnormal in structure. There is severe calcification of the aortic valve.Mild aortic valve regurgitation is present. Mild aortic valve stenosis is present. There is mild low output low gradient aortic valve stenosis Aortic valve mean pressure gradient is 7.0 mmHg. Aortic valve area is 1.2 cm2.  · Severe MAC is present. There is severe bileaflet mitral valve thickening present. Mild mitral valve regurgitation is present. Moderate mitral valve stenosis is present. The mitral valve mean gradient is 7.0 mmHg. The mitral valve peak gradient is  16.3 mmHg. The mitral valve area by continuity equation is 1.1 cm².  · Mild tricuspid valve regurgitation is present. Estimated right ventricular systolic pressure from tricuspid regurgitation is mildly elevated (35-45 mmHg). Calculated right ventricular systolic pressure from tricuspid regurgitation is 44 mmHg.    Labs in chart were reviewed.  Collected Updated Procedure Result Status      03/17/2019 0443 03/17/2019 0603 Vitamin B12 [[id number]]   Blood     Final result Vitamin B-12 921 pg/mL          03/17/2019 0443 03/17/2019 0544 Magnesium [[id number]]   Blood     Final result Magnesium 2.0 mg/dL          03/17/2019 0443 03/17/2019 0544 Basic Metabolic Panel [[id number]]    (Abnormal)   Blood     Final result Glucose 103 Abnormally high  mg/dL   BUN 11 mg/dL   Creatinine 0.40 Abnormally low  mg/dL   Sodium 133 Abnormally low  mmol/L   Potassium 3.6 mmol/L   Chloride 93 Abnormally low  mmol/L   CO2 27.0 mmol/L   Calcium 8.5 mg/dL   eGFR Non African Am 150 mL/min/1.73   BUN/Creatinine Ratio 27.5 Abnormally high     Anion Gap 13.0 mmol/L          03/16/2019 0600 03/16/2019 0725 Basic Metabolic Panel [[id number]]    (Abnormal)   Blood     Final result Glucose 118 Abnormally high  mg/dL   BUN 9 mg/dL   Creatinine 0.37 Abnormally low  mg/dL   Sodium 133 Abnormally low  mmol/L   Potassium 3.5 mmol/L   Chloride 93 Abnormally low  mmol/L   CO2 27.7 mmol/L   Calcium 8.6 mg/dL   eGFR Non African Am >150 mL/min/1.73   BUN/Creatinine Ratio 24.3    Anion Gap 12.3 mmol/L          03/16/2019 0600 03/16/2019 0725 Magnesium [[id number]]   Blood     Final result Magnesium 2.0 mg/dL          03/16/2019 0600 03/16/2019 1352 TSH [[id number]]   Blood     Final result TSH Baseline 2.860 mIU/mL          03/16/2019 0600 03/16/2019 1352 T4, Free [[id number]]   Blood     Final result Free T4 1.24 ng/dL          03/16/2019 0216 03/16/2019 0250 Urinalysis With Microscopic If Indicated (No Culture) - Urine, Clean Catch [[id number]]    (Abnormal)    Urine, Clean Catch     Final result Color, UA Yellow   Appearance, UA Cloudy Abnormal    pH, UA 8.5 Abnormally high    Specific Gravity, UA 1.013   Glucose, UA Negative   Ketones, UA Negative   Bilirubin, UA Negative   Blood, UA Negative   Protein, UA Negative   Leukocytes, UA Negative   Nitrite, UA Negative   Urobilinogen, UA 0.2 E.U./dL            amLODIPine 5 mg Oral Q24H   citalopram 20 mg Oral Daily   dexamethasone 1 mg Oral Q8H   famotidine 40 mg Oral Daily   metoprolol succinate XL 25 mg Oral Q24H   multivitamin with minerals 1 tablet Oral Daily   potassium chloride 20 mEq Oral Daily   sodium chloride 3 mL Intravenous Q12H      Diet Regular      Assessment/Plan      Active Hospital Problems    Diagnosis  POA   • **Dizziness [R42]  Yes   • Cardiomyopathy (CMS/HCC) [I42.9]  Unknown   • Cerebrovascular disease [I67.9]  Unknown   • Moderate protein-calorie malnutrition (CMS/HCC) [E44.0]  Yes   • Traumatic hematoma of forehead [S00.83XA]  Unknown   • Heart murmur [R01.1]  Unknown   • Lightheadedness [R42]  Yes   • Falls frequently [R29.6]  Not Applicable   • Dehydration [E86.0]  Yes   • Lichen planus [L43.9]  Yes   • Essential hypertension [I10]  Yes   • Dementia without behavioral disturbance [F03.90]  Yes      Resolved Hospital Problems   No resolved problems to display.       · PT/OT/CCP- will likely need rehab  · Check CT head with fall and head injury- done. +hematoma but no ICH. Old strokes seen may benefit from ASA but also with risk of bleeding would need to consider later as an outpatient if ambulating better.     · new cardiomyopathy. Started Metoprolol XL. Cardiology consultation  · TSH and B12 ok  · Nutrition consultation, has had significant weight loss  · Continue majority of home meds    DW RN  DW family              Compa Villalba MD  [facility]  03/17/19  12:24 PM

## 2019-03-18 NOTE — THERAPY EVALUATION
Acute Care - Occupational Therapy Initial Evaluation  [facility]     Patient Name: Belinda Vega  : 1928  MRN: [id number]  Today's Date: 3/18/2019        Referring Physician: Dr. Berman    Admit Date: 3/15/2019       ICD-10-CM ICD-9-CM   1. Impaired functional mobility, balance, gait, and endurance Z74.09 V49.89     Patient Active Problem List   Diagnosis   • Dizziness   • Lightheadedness   • Falls frequently   • Dehydration   • Lichen planus   • Essential hypertension   • Anxiety disorder   • Dementia without behavioral disturbance   • Moderate protein-calorie malnutrition (CMS/HCC)   • Traumatic hematoma of forehead   • Heart murmur   • Cardiomyopathy (CMS/HCC)   • Cerebrovascular disease   • Impaired functional mobility, balance, gait, and endurance     History reviewed. No pertinent past medical history.  History reviewed. No pertinent surgical history.       OT ASSESSMENT FLOWSHEET (last 72 hours)      Occupational Therapy Evaluation     Row Name 19 1332                   OT Evaluation Time/Intention    Subjective Information  no complaints  -RP        Document Type  evaluation  -RP        Mode of Treatment  occupational therapy  -RP        Patient Effort  good  -RP        Symptoms Noted During/After Treatment  none  -RP           General Information    Patient Profile Reviewed?  yes  -RP        Patient Observations  alert;cooperative;agree to therapy  -RP        General Observations of Patient  pt semi-supine in bed w/ no signs of acute distress  -RP        Prior Level of Function  min assist:;ADL's  -RP        Equipment Currently Used at Home  rollator  -RP        Existing Precautions/Restrictions  fall  -RP        Risks Reviewed  patient:  -RP        Benefits Reviewed  patient:  -RP           Relationship/Environment    Lives With  alone  -RP           Resource/Environmental Concerns    Current Living Arrangements  home/apartment/condo  -RP           Cognitive Assessment/Intervention- PT/OT     Orientation Status (Cognition)  oriented x 3  -RP        Follows Commands (Cognition)  follows one step commands;over 90% accuracy;repetition of directions required  -RP        Safety Deficit (Cognitive)  mild deficit  -RP           Bed Mobility Assessment/Treatment    Bed Mobility Assessment/Treatment  supine-sit;sit-supine  -RP        Supine-Sit [place] (Bed Mobility)  contact guard  -RP        Sit-Supine [place] (Bed Mobility)  nonverbal cues (demo/gesture)  -RP        Assistive Device (Bed Mobility)  bed rails;head of bed elevated  -RP           Transfer Assessment/Treatment    Transfer Assessment/Treatment  sit-stand transfer;stand-sit transfer  -RP        Comment (Transfers)  x3 sit <> stand trials w/ educ on proper B UE hand placement   -RP           Sit-Stand Transfer    Sit-Stand [place] (Transfers)  minimum assist (75% patient effort);verbal cues  -RP        Assistive Device (Sit-Stand Transfers)  walker, 4-wheeled 3-wheeled  -RP           Stand-Sit Transfer    Stand-Sit [place] (Transfers)  minimum assist (75% patient effort);verbal cues  -RP        Assistive Device (Stand-Sit Transfers)  walker, 4-wheeled 3 wheeled  -RP           ADL Assessment/Intervention    BADL Assessment/Intervention  lower body dressing  -RP           Lower Body Dressing Assessment/Training    Lower Body Dressing [place] Level  doff;don;socks;contact guard assist;verbal cues  -RP        Lower Body Dressing Position  edge of bed sitting  -RP        Comment (Lower Body Dressing)  CGA for dynamic sitting balance; extra time required  -RP           General ROM    GENERAL ROM COMMENTS  B UE AROM WFL  -RP           MMT (Manual Muscle Testing)    General MMT Comments  B UE MMT: grossly approx. 3+/5   -RP           Motor Assessment/Interventions    Additional Documentation  Balance (Group)  -RP           Balance    Balance  static sitting balance;static standing balance  -RP           Static Sitting Balance     Level of [place] (Unsupported Sitting, Static Balance)  supervision  -RP        Sitting Position (Unsupported Sitting, Static Balance)  sitting on edge of bed  -RP        Time Able to Maintain Position (Unsupported Sitting, Static Balance)  more than 5 minutes  -RP           Static Standing Balance    Level of [place] (Supported Standing, Static Balance)  minimal assist, 75% patient effort;contact guard assist  -RP        Time Able to Maintain Position (Supported Standing, Static Balance)  1 to 2 minutes  -RP        Assistive Device Utilized (Supported Standing, Static Balance)  walker, rolling  -RP           Sensory Assessment/Intervention    Sensory General Assessment  no sensation deficits identified  -RP           Positioning and Restraints    Pre-Treatment Position  in bed  -RP        Post Treatment Position  bed  -RP        In Bed  fowlers;call light within reach;encouraged to call for assist;exit alarm on;with family/caregiver;notified nsg  -RP           Pain Assessment    Additional Documentation  Pain Scale: Numbers Pre/Post-Treatment (Group)  -RP           Pain Scale: Numbers Pre/Post-Treatment    Pain Scale: Numbers, Pretreatment  0/10 - no pain  -RP        Pain Scale: Numbers, Post-Treatment  0/10 - no pain  -RP           Wound 03/15/19 2051 Left anterior;lower;proximal leg surgical    Wound - Properties Group Date first assessed: 03/15/19  -DB Time first assessed: 2051  -DB Side: Left  -DB Orientation: anterior;lower;proximal  -DB Location: leg  -DB Type: surgical  -DB Stage, Pressure Injury: other (see comments)  -DB, skin cancer removal        Coping    Observed Emotional State  accepting;calm;cooperative  -RP        Verbalized Emotional State  acceptance  -RP           Plan of Care Review    Plan of Care Reviewed With  patient  -RP           Clinical Impression (OT)    OT Diagnosis  pt presents to OT eval w/ generalized weakness, decreased balance, and increased assistance required for  self-care tasks   -RP        Criteria for Skilled Therapeutic Interventions Met (OT Eval)  yes;treatment indicated  -RP        Rehab Potential (OT Eval)  good, to achieve stated therapy goals  -RP        Therapy Frequency (OT Eval)  5 times/wk  -RP        Care Plan Review (OT)  evaluation/treatment results reviewed;care plan/treatment goals reviewed;patient/other agree to care plan  -RP        Anticipated Discharge Disposition (OT)  skilled nursing facility;home with assist;home with home health pending pt progress  -RP           Planned OT Interventions    Planned Therapy Interventions (OT Eval)  activity tolerance training;BADL retraining;functional balance retraining;transfer/mobility retraining;strengthening exercise;ROM/therapeutic exercise;patient/caregiver education/training  -RP           OT Goals    Transfer Goal Selection (OT)  transfer, OT goal 1  -RP        Dressing Goal Selection (OT)  dressing, OT goal 1  -RP        Strength Goal Selection (OT)  strength, OT goal 1  -RP        Additional Documentation  Strength Goal Selection (OT) (Row)  -RP           Transfer Goal 1 (OT)    Activity/Assistive Device (Transfer Goal 1, OT)  toilet  -RP        Time Frame (Transfer Goal 1, OT)  long term goal (LTG)  -RP        Progress/Outcome (Transfer Goal 1, OT)  goal ongoing  -RP           Dressing Goal 1 (OT)    Activity/Assistive Device (Dressing Goal 1, OT)  lower body dressing  -RP        Bear/Cues Needed (Dressing Goal 1, OT)  supervision required  -RP        Time Frame (Dressing Goal 1, OT)  long term goal (LTG)  -RP        Progress/Outcome (Dressing Goal 1, OT)  goal ongoing  -RP           Strength Goal 1 (OT)    Strength Goal 1 (OT)  Pt will increase B UE strength to approx. 4/5 to assist w/ self-care tasks  -RP        Time Frame (Strength Goal 1, OT)  long term goal (LTG)  -RP        Progress/Outcome (Strength Goal 1, OT)  goal ongoing  -RP          User Key  (r) = Recorded By, (t) = Taken By, (c) =  Cosigned By    Initials Name Effective Dates    DB Shamir Blancas RN 06/16/16 -     Danette Hernandez OT 05/03/18 -          Occupational Therapy Education     Title: PT OT SLP Therapies (In Progress)     Topic: Occupational Therapy (In Progress)     Point: ADL training (Done)     Description: Instruct learner(s) on proper safety adaptation and remediation techniques during self care or transfers.   Instruct in proper use of assistive devices.    Learning Progress Summary           Patient Acceptance, E, VU,NR by RADU at 3/18/2019  2:21 PM    Comment:  OT educ on OT role in therapeutic process and pt's POC.                               User Key     Initials Effective Dates Name Provider Type Discipline    RP 05/03/18 -  Danette Davenport, OT Occupational Therapist OT                  OT Recommendation and Plan  Outcome Summary/Treatment Plan (OT)  Anticipated Discharge Disposition (OT): skilled nursing facility, home with assist, home with home health(pending pt progress)  Planned Therapy Interventions (OT Eval): activity tolerance training, BADL retraining, functional balance retraining, transfer/mobility retraining, strengthening exercise, ROM/therapeutic exercise, patient/caregiver education/training  Therapy Frequency (OT Eval): 5 times/wk  Plan of Care Review  Plan of Care Reviewed With: patient  Plan of Care Reviewed With: patient  Outcome Summary: Pt is a 89 year old female admitted d/t increasing falls and dizziness. Pt reports that she lives alone and has caregivers assist as needed- reports min A required w/ ADLs @ PLOF. Pt presents to OT eval w/ generalized weakness, decreased balance, and increased assistance required for self-care tasks. Pt completed LB dressing task w/ min A; STS transfer w/ min A- x3 trials w/ educ/demo on proper technique and B UE hand placement. Pt may benefit from skilled OT services to address deficits and improve functional indep and safety w/ ADLs & functional  transfers.    Outcome Measures     Row Name 03/18/19 1400 03/18/19 1100 03/17/19 1500       How much help from another person do you currently need...    Turning from your back to your side while in flat bed without using bedrails?  --  4  -LH  4  -DO    Moving from lying on back to sitting on the side of a flat bed without bedrails?  --  3  -LH  3  -DO    Moving to and from a bed to a chair (including a wheelchair)?  --  3  -LH  3  -DO    Standing up from a chair using your arms (e.g., wheelchair, bedside chair)?  --  3  -LH  3  -DO    Climbing 3-5 steps with a railing?  --  2  -LH  2  -DO    To walk in hospital room?  --  3  -LH  3  -DO    AM-PAC 6 Clicks Score  --  18  -LH  18  -DO       How much help from another is currently needed...    Putting on and taking off regular lower body clothing?  3  -RP  --  --    Bathing (including washing, rinsing, and drying)  3  -RP  --  --    Toileting (which includes using toilet bed pan or urinal)  2  -RP  --  --    Putting on and taking off regular upper body clothing  3  -RP  --  --    Taking care of personal grooming (such as brushing teeth)  3  -RP  --  --    Eating meals  3  -RP  --  --    Score  17  -RP  --  --       Functional Assessment    Outcome Measure Options  AM-PAC 6 Clicks Daily Activity (OT)  -[facility] Clicks Basic Mobility (PT)  [facility]  -DO    Row Name 03/16/19 1500             How much help from another person do you currently need...    Turning from your back to your side while in flat bed without using bedrails?  4  -DO      Moving from lying on back to sitting on the side of a flat bed without bedrails?  3  -DO      Moving to and from a bed to a chair (including a wheelchair)?  3  -DO      Standing up from a chair using your arms (e.g., wheelchair, bedside chair)?  3  -DO      Climbing 3-5 steps with a railing?  2  -DO      To walk in hospital room?  3  -DO      AM-PAC 6 Clicks Score  18  -DO         Functional  Assessment    Outcome Measure Options  AM-PAC 6 Clicks Basic Mobility (PT)  -DO        User Key  (r) = Recorded By, (t) = Taken By, (c) = Cosigned By    Initials Name Provider Type    LH Brook Gorman, PT Physical Therapist    DO Claude Womack, PT Physical Therapist    Danette Hernandez, OT Occupational Therapist          Time Calculation:   Time Calculation- OT     Row Name 03/18/19 1424             Time Calculation- OT    OT Start Time  1315  -RP      OT Stop Time  1332  -RP      OT Time Calculation (min)  17 min  -RP      Total Timed Code Minutes- OT  10 minute(s)  -RP      OT Received On  03/18/19  -RP        User Key  (r) = Recorded By, (t) = Taken By, (c) = Cosigned By    Initials Name Provider Type    Danette Hernandez OT Occupational Therapist        Therapy Suggested Charges     Code   Minutes Charges    None           Therapy Charges for Today     Code Description Service Date Service Provider Modifiers Qty    81425694745  OT EVAL MOD COMPLEXITY 2 3/18/2019 Danette Davenport OT GO 1    [id number]  OT THERAPEUTIC ACT EA 15 MIN 3/18/2019 Danette Davenport OT GO 1               Danette Davenport OT  3/18/2019

## 2019-03-18 NOTE — PROGRESS NOTES
Name: Belinda Vega ADMIT: 3/15/2019   : 1928  PCP: Marlen Berman MD    MRN: [id number] LOS: 2 days   AGE/SEX: 89 y.o. female  ROOM: [facility]   Subjective   Cc- weakness    Still very weak  Difficulty even with transfers  Working with therapists  Taking suppliments    ROS  No f/c  No n/v      Objective   Vital Signs  Temp:  [98.3 °F (36.8 °C)-98.5 °F (36.9 °C)] 98.3 °F (36.8 °C)  Heart Rate:  [65-69] 65  Resp:  [16] 16  BP: (126-156)/(55-72) 156/72  SpO2:  [94 %-97 %] 95 %  on   ;   Device (Oxygen Therapy): room air  Body mass index is 20.2 kg/m².    Physical Exam   Constitutional: She is oriented to person, place, and time.   Elderly, chronically ill   HENT:   Head: Normocephalic and atraumatic.   Eyes: Conjunctivae are normal. No scleral icterus.   Neck: Neck supple. No tracheal deviation present.   Cardiovascular: Normal rate and regular rhythm.   Murmur heard.  Pulmonary/Chest: Effort normal and breath sounds normal.   Abdominal: Soft. There is no tenderness. There is no guarding.   Genitourinary:   Genitourinary Comments: Deferred    Musculoskeletal:   Muscle atrophy   Neurological: She is alert and oriented to person, place, and time. She exhibits normal muscle tone.   Skin: Skin is warm and dry.   Psychiatric: She has a normal mood and affect. Her behavior is normal.       Results Review:       I reviewed the patient's new clinical results.  Results from last 7 days   Lab Units 03/15/19  1539   WBC 10*3/mm3 8.01   HEMOGLOBIN g/dL 11.3*   PLATELETS 10*3/mm3 195     Results from last 7 days   Lab Units 19  0553 19  0443 19  0600 03/15/19  1539   SODIUM mmol/L 132* 133* 133* 134*   POTASSIUM mmol/L 4.0 3.6 3.5 4.4   CHLORIDE mmol/L 96* 93* 93* 94*   CO2 mmol/L 26.7 27.0 27.7 29.9*   BUN mg/dL 16 11 9 13   CREATININE mg/dL 0.37* 0.40* 0.37* 0.42*   GLUCOSE mg/dL 98 103* 118* 91   Estimated Creatinine Clearance: 32.8 mL/min (A) (by C-G formula based on SCr of 0.37 mg/dL (L)).  Results  from last 7 days   Lab Units 03/15/19  1539   ALBUMIN g/dL 3.50   BILIRUBIN mg/dL 0.3   ALK PHOS U/L 84   AST (SGOT) U/L 15   ALT (SGPT) U/L 7     Results from last 7 days   Lab Units 03/18/19  0553 03/17/19  0443 03/16/19  0600 03/15/19  1539   CALCIUM mg/dL 8.8 8.5 8.6 9.2   ALBUMIN g/dL  --   --   --  3.50   MAGNESIUM mg/dL  --  2.0 2.0 2.2   PHOSPHORUS mg/dL  --   --   --  3.6         Radiology(recent) Ct Head Without Contrast    Result Date: 3/16/2019  Evidence of mild small vessel chronic ischemic change as described. Small chronic right cerebellar and basal ganglia infarcts. Large hematoma in the scalp overlying the left frontal bone. No acute intracranial abnormality is identified.  The patient's nurse was informed that a completed corrected report was available for review on the electronic medical record system on 3/16/2019 at 6:20 PM  This report was finalized on 3/16/2019 6:22 PM by Dr. Jean Paul Ortiz M.D.        2D ECHO 3/16/19  · Calculated EF = 32%. Estimated EF was in agreement with the calculated EF. Estimated EF appears to be in the range of 31 - 35%. Estimated EF = 32%. Normal left ventricular cavity size and wall thickness noted. There is left ventricular global hypokinesis noted. Left ventricular diastolic dysfunction is noted (grade I a w/high LAP) consistent with impaired relaxation.  · Left atrial volume is severely increased.  · Right atrial cavity size is moderately dilated  · The aortic valve is abnormal in structure. There is severe calcification of the aortic valve.Mild aortic valve regurgitation is present. Mild aortic valve stenosis is present. There is mild low output low gradient aortic valve stenosis Aortic valve mean pressure gradient is 7.0 mmHg. Aortic valve area is 1.2 cm2.  · Severe MAC is present. There is severe bileaflet mitral valve thickening present. Mild mitral valve regurgitation is present. Moderate mitral valve stenosis is present. The mitral valve mean gradient is 7.0  mmHg. The mitral valve peak gradient is 16.3 mmHg. The mitral valve area by continuity equation is 1.1 cm².  · Mild tricuspid valve regurgitation is present. Estimated right ventricular systolic pressure from tricuspid regurgitation is mildly elevated (35-45 mmHg). Calculated right ventricular systolic pressure from tricuspid regurgitation is 44 mmHg.    Labs in chart were reviewed.  Collected Updated Procedure Result Status      03/17/2019 0443 03/17/2019 0603 Vitamin B12 [[id number]]   Blood     Final result Vitamin B-12 921 pg/mL          03/17/2019 0443 03/17/2019 0544 Magnesium [[id number]]   Blood     Final result Magnesium 2.0 mg/dL          03/17/2019 0443 03/17/2019 0544 Basic Metabolic Panel [[id number]]    (Abnormal)   Blood     Final result Glucose 103 Abnormally high  mg/dL   BUN 11 mg/dL   Creatinine 0.40 Abnormally low  mg/dL   Sodium 133 Abnormally low  mmol/L   Potassium 3.6 mmol/L   Chloride 93 Abnormally low  mmol/L   CO2 27.0 mmol/L   Calcium 8.5 mg/dL   eGFR Non African Am 150 mL/min/1.73   BUN/Creatinine Ratio 27.5 Abnormally high     Anion Gap 13.0 mmol/L          03/16/2019 0600 03/16/2019 0725 Basic Metabolic Panel [[id number]]    (Abnormal)   Blood     Final result Glucose 118 Abnormally high  mg/dL   BUN 9 mg/dL   Creatinine 0.37 Abnormally low  mg/dL   Sodium 133 Abnormally low  mmol/L   Potassium 3.5 mmol/L   Chloride 93 Abnormally low  mmol/L   CO2 27.7 mmol/L   Calcium 8.6 mg/dL   eGFR Non African Am >150 mL/min/1.73   BUN/Creatinine Ratio 24.3    Anion Gap 12.3 mmol/L          03/16/2019 0600 03/16/2019 0725 Magnesium [[id number]]   Blood     Final result Magnesium 2.0 mg/dL          03/16/2019 0600 03/16/2019 1352 TSH [[id number]]   Blood     Final result TSH Baseline 2.860 mIU/mL          03/16/2019 0600 03/16/2019 1352 T4, Free [[id number]]   Blood     Final result Free T4 1.24 ng/dL          03/16/2019 0216 03/16/2019 0250 Urinalysis With Microscopic If Indicated (No Culture) - Urine,  Clean Catch [[id number]]    (Abnormal)   Urine, Clean Catch     Final result Color, UA Yellow   Appearance, UA Cloudy Abnormal    pH, UA 8.5 Abnormally high    Specific Gravity, UA 1.013   Glucose, UA Negative   Ketones, UA Negative   Bilirubin, UA Negative   Blood, UA Negative   Protein, UA Negative   Leukocytes, UA Negative   Nitrite, UA Negative   Urobilinogen, UA 0.2 E.U./dL            citalopram 20 mg Oral Daily   dexamethasone 1 mg Oral Q8H   famotidine 40 mg Oral Daily   hydrOXYzine 10 mg Oral Nightly   lidocaine viscous 5 mL Mouth/Throat TID AC   LORazepam 1 mg Oral Q12H   losartan 50 mg Oral Daily   metoprolol succinate XL 25 mg Oral Q24H   multivitamin with minerals 1 tablet Oral Daily   potassium chloride 20 mEq Oral Daily   sodium chloride 3 mL Intravenous Q12H   spironolactone 25 mg Oral Daily      Diet Regular      Assessment/Plan      Active Hospital Problems    Diagnosis  POA   • **Dizziness [R42]  Yes   • Cardiomyopathy (CMS/HCC) [I42.9]  Unknown   • Cerebrovascular disease [I67.9]  Unknown   • Impaired functional mobility, balance, gait, and endurance [Z74.09]  Yes   • Moderate protein-calorie malnutrition (CMS/HCC) [E44.0]  Yes   • Traumatic hematoma of forehead [S00.83XA]  Unknown   • Heart murmur [R01.1]  Unknown   • Lightheadedness [R42]  Yes   • Falls frequently [R29.6]  Not Applicable   • Dehydration [E86.0]  Yes   • Lichen planus [L43.9]  Yes   • Essential hypertension [I10]  Yes   • Dementia without behavioral disturbance [F03.90]  Yes      Resolved Hospital Problems   No resolved problems to display.       · PT/OT/CCP- will need rehab  · CT head with fall and head injury-+hematoma but no ICH. Old strokes seen may benefit from ASA but also with risk of bleeding would need to consider later as an outpatient if ambulating better.     · new cardiomyopathy.  Cardiology following and medications added  · TSH and B12 ok  · Nutrition following, has had significant weight loss related to her Lichen  planus and poor intake. On supplementation  ·   GOPI RN  GOPI Villalba MD  [facility]  03/18/19  12:24 PM

## 2019-03-18 NOTE — PLAN OF CARE
Problem: Patient Care Overview  Goal: Plan of Care Review  Outcome: Ongoing (interventions implemented as appropriate)   03/18/19 5480   Coping/Psychosocial   Plan of Care Reviewed With patient   Plan of Care Review   Progress improving   OTHER   Outcome Summary Pt is a 89 year old female admitted d/t increasing falls and dizziness. Pt reports that she lives alone and has caregivers assist as needed- reports min A required w/ ADLs @ PLOF. Pt presents to OT eval w/ generalized weakness, decreased balance, and increased assistance required for self-care tasks. Pt completed LB dressing task w/ min A; STS transfer w/ min A- x3 trials w/ educ/demo on proper technique and B UE hand placement. Pt may benefit from skilled OT services to address deficits and improve functional indep and safety w/ ADLs & functional transfers.

## 2019-03-18 NOTE — THERAPY TREATMENT NOTE
Acute Care - Physical Therapy Treatment Note  [facility]     Patient Name: Belinda Vega  : 1928  MRN: [id number]  Today's Date: 3/18/2019     Date of Referral to PT: 19  Referring Physician: Dr. Berman    Admit Date: 3/15/2019    Visit Dx:    ICD-10-CM ICD-9-CM   1. Impaired functional mobility, balance, gait, and endurance Z74.09 V49.89     Patient Active Problem List   Diagnosis   • Dizziness   • Lightheadedness   • Falls frequently   • Dehydration   • Lichen planus   • Essential hypertension   • Anxiety disorder   • Dementia without behavioral disturbance   • Moderate protein-calorie malnutrition (CMS/HCC)   • Traumatic hematoma of forehead   • Heart murmur   • Cardiomyopathy (CMS/HCC)   • Cerebrovascular disease   • Impaired functional mobility, balance, gait, and endurance       Therapy Treatment    Rehabilitation Treatment Summary     Row Name 19 1155             Treatment Time/Intention    Discipline  physical therapist  -      Document Type  therapy note (daily note)  -      Subjective Information  no complaints  -      Mode of Treatment  individual therapy;physical therapy  -      Patient/Family Observations  pt seated EOB no acute distress  -      Existing Precautions/Restrictions  fall  -LH      Recorded by [] Brook Gorman, PT 19 1156      Row Name 19 1155             Cognitive Assessment/Intervention- PT/OT    Orientation Status (Cognition)  oriented x 3  -      Follows Commands (Cognition)  follows one step commands;over 90% accuracy  -      Safety Deficit (Cognitive)  mild deficit  -      Recorded by [] Brook Gorman, PT 19 1156      Row Name 19 1155             Bed Mobility Assessment/Treatment    Comment (Bed Mobility)  NT seated EOB  -      Recorded by [] Brook Gorman, PT 19 1156      Row Name 19 1155             Sit-Stand Transfer    Sit-Stand [place] (Transfers)  minimum assist (75% patient effort)  -       Assistive Device (Sit-Stand Transfers)  walker, 4-wheeled 3 wheeled  -LH      Recorded by [] Brook Gorman, PT 03/18/19 1156      Row Name 03/18/19 1155             Stand-Sit Transfer    Stand-Sit [place] (Transfers)  minimum assist (75% patient effort);verbal cues  -      Assistive Device (Stand-Sit Transfers)  walker, 4-wheeled  -LH      Recorded by [] Brook Gorman, PT 03/18/19 1156      Row Name 03/18/19 1155             Gait/Stairs Assessment/Training    [place] Level (Gait)  minimum assist (75% patient effort);verbal cues  -      Assistive Device (Gait)  -- 3 wheels  -LH      Distance in Feet (Gait)  130  -LH      Pattern (Gait)  step-through  -LH      Deviations/Abnormal Patterns (Gait)  gait speed decreased  -LH      Bilateral Gait Deviations  forward flexed posture;heel strike decreased  -LH      Recorded by [] Brook Gorman, PT 03/18/19 1156      Row Name 03/18/19 1155             Positioning and Restraints    Pre-Treatment Position  in bed  -LH      Post Treatment Position  bed  -LH      In Bed  notified nsg;sitting EOB;call light within reach;encouraged to call for assist;exit alarm on;with family/caregiver  -LH      Recorded by [] Brook Gorman, PT 03/18/19 1156      Row Name 03/18/19 1155             Pain Scale: Numbers Pre/Post-Treatment    Pain Scale: Numbers, Pretreatment  0/10 - no pain  -      Pain Scale: Numbers, Post-Treatment  0/10 - no pain  -LH      Recorded by [] Brook Gorman, PT 03/18/19 1156      Row Name                Wound 03/15/19 2051 Left anterior;lower;proximal leg surgical    Wound - Properties Group Date first assessed: 03/15/19 [DB] Time first assessed: 2051 [DB] Side: Left [DB] Orientation: anterior;lower;proximal [DB] Location: leg [DB] Type: surgical [DB] Stage, Pressure Injury: other (see comments) [DB], skin cancer removal  Recorded by:  [DB] Shamir Blancas, LOUIS 03/16/19 0110      User Key  (r) = Recorded By, (t) = Taken By, (c) = Cosigned By     Initials Name Effective Dates Discipline     Sherif Brook K, PT 04/03/18 -  PT    DB Shamir Blancas, RN 06/16/16 -  Nurse          Wound 03/15/19 2051 Left anterior;lower;proximal leg surgical (Active)   Dressing Appearance open to air 3/18/2019 10:00 AM   Closure Open to air 3/18/2019 12:11 AM   Drainage Amount none 3/18/2019 12:11 AM   Dressing Care, Wound open to air 3/17/2019  9:05 PM           Physical Therapy Education     Title: PT OT SLP Therapies (In Progress)     Topic: Physical Therapy (In Progress)     Point: Mobility training (In Progress)     Learning Progress Summary           Patient Acceptance, E, NR by  at 3/18/2019 11:57 AM    Acceptance, E, VU,DU by DO at 3/17/2019  3:46 PM    Acceptance, E, VU,DU,NR by DO at 3/16/2019  2:59 PM   Family Acceptance, E, NR by  at 3/18/2019 11:57 AM    Acceptance, E, VU,DU by DO at 3/17/2019  3:46 PM    Acceptance, E, VU,DU,NR by DO at 3/16/2019  2:59 PM                   Point: Home exercise program (In Progress)     Learning Progress Summary           Patient Acceptance, E, NR by  at 3/18/2019 11:57 AM    Acceptance, E, VU,DU by DO at 3/17/2019  3:46 PM    Acceptance, E, VU,DU,NR by DO at 3/16/2019  2:59 PM   Family Acceptance, E, NR by  at 3/18/2019 11:57 AM    Acceptance, E, VU,DU by DO at 3/17/2019  3:46 PM    Acceptance, E, VU,DU,NR by DO at 3/16/2019  2:59 PM                   Point: Body mechanics (In Progress)     Learning Progress Summary           Patient Acceptance, E, NR by  at 3/18/2019 11:57 AM    Acceptance, E, VU,DU by DO at 3/17/2019  3:46 PM    Acceptance, E, VU,DU,NR by DO at 3/16/2019  2:59 PM   Family Acceptance, E, NR by  at 3/18/2019 11:57 AM    Acceptance, E, VU,DU by DO at 3/17/2019  3:46 PM    Acceptance, E, VU,DU,NR by DO at 3/16/2019  2:59 PM                   Point: Precautions (In Progress)     Learning Progress Summary           Patient AcceptanceDIAZ NR by  at 3/18/2019 11:57 AM    DIAZ Yang VU, DU by  at  3/17/2019  3:46 PM    Acceptance, E, VU,DU,NR by DO at 3/16/2019  2:59 PM   Family Acceptance, E, NR by  at 3/18/2019 11:57 AM    Acceptance, E, VU,DU by DO at 3/17/2019  3:46 PM    Acceptance, E, VU,DU,NR by DO at 3/16/2019  2:59 PM                               User Key     Initials Effective Dates Name Provider Type [facility] 04/03/18 -  Brook Gorman, PT Physical Therapist PT    DO 03/07/18 -  Claude Womack, PT Physical Therapist PT                PT Recommendation and Plan     Plan of Care Reviewed With: patient  Outcome Summary: pt agreeable to PT This date, good tolerance to household distances Min A 3-wheeled rollator, slowed michelle. posterior lean in inital stance. req cueing. will cont to progress as deshawn.   Outcome Measures     Row Name 03/18/19 1100 03/17/19 1500 03/16/19 1500       How much help from another person do you currently need...    Turning from your back to your side while in flat bed without using bedrails?  4  -  4  -DO  4  -DO    Moving from lying on back to sitting on the side of a flat bed without bedrails?  3  -  3  -DO  3  -DO    Moving to and from a bed to a chair (including a wheelchair)?  3  -  3  -DO  3  -DO    Standing up from a chair using your arms (e.g., wheelchair, bedside chair)?  3  -  3  -DO  3  -DO    Climbing 3-5 steps with a railing?  2  -  2  -DO  2  -DO    To walk in hospital room?  3  -  3  -DO  3  -DO    AM-PAC 6 Clicks Score  18  -  18  -DO  18  -DO       Functional Assessment    Outcome Measure Options  AM-PAC 6 Clicks Basic Mobility (PT)  -  AM-PAC 6 Clicks Basic Mobility (PT)  -DO  AM-PAC 6 Clicks Basic Mobility (PT)  -DO      User Key  (r) = Recorded By, (t) = Taken By, (c) = Cosigned By    Initials Name Provider Type     Brook Gorman, PT Physical Therapist    DO Claude Womack, PT Physical Therapist         Time Calculation:   PT Charges     Row Name 03/18/19 1158             Time Calculation    Start Time  1132  -      Stop  Time  1148  -      Time Calculation (min)  16 min  -      PT Received On  03/18/19  -      PT - Next Appointment  03/19/19  -        User Key  (r) = Recorded By, (t) = Taken By, (c) = Cosigned By    Initials Name Provider Type     Brook Gorman, PT Physical Therapist        Therapy Suggested Charges     Code   Minutes Charges    None           Therapy Charges for Today     Code Description Service Date Service Provider Modifiers Qty    26848081698 HC PT THER PROC EA 15 MIN 3/18/2019 Brook Gorman, PT GP 1          PT G-Codes  Outcome Measure Options: AM-PAC 6 Clicks Basic Mobility (PT)  AM-PAC 6 Clicks Score: 18    Brook Gorman, PT  3/18/2019

## 2019-03-18 NOTE — PLAN OF CARE
Problem: Patient Care Overview  Goal: Plan of Care Review  Outcome: Ongoing (interventions implemented as appropriate)   03/18/19 1422 03/18/19 1800   Coping/Psychosocial   Plan of Care Reviewed With --  patient   Plan of Care Review   Progress improving --    OTHER   Outcome Summary --  Pt is looking well. Orders fixed with pharmacy for oral liquid meds. Will ct to monitior.        Problem: Fall Risk (Adult)  Goal: Absence of Fall  Outcome: Ongoing (interventions implemented as appropriate)      Problem: Skin Injury Risk (Adult)  Goal: Skin Health and Integrity  Outcome: Ongoing (interventions implemented as appropriate)      Problem: Nutrition, Imbalanced: Inadequate Oral Intake (Adult)  Goal: Improved Oral Intake  Outcome: Ongoing (interventions implemented as appropriate)    Goal: Prevent Further Weight Loss  Outcome: Ongoing (interventions implemented as appropriate)

## 2019-03-18 NOTE — PROGRESS NOTES
"Belinda Vega  2/22/1928 89 y.o.  [id number]      Patient Care Team:  Marlen Berman MD as PCP - General (Nephrology)    CC: Mild l aortic stenosis    Interval History: No significant change      Objective   Vital Signs  Temp:  [98.3 °F (36.8 °C)-98.5 °F (36.9 °C)] 98.3 °F (36.8 °C)  Heart Rate:  [65-69] 65  Resp:  [16] 16  BP: (126-156)/(55-72) 156/72  No intake or output data in the 24 hours ending 03/18/19 1312  Flowsheet Rows      First Filed Value   Admission Height  149.9 cm (59\") Documented at 03/15/2019 1700   Admission Weight  45.4 kg (100 lb) Documented at 03/16/2019 1117          Physical Exam:   General Appearance:    Alert,oriented, in no acute distress   Lungs:     Clear to auscultation,BS are equal    Heart:    Normal S1 and S2, RRR with 2 out of 6 systolic ejection murmur, gallop or rub   HEENT:    Sclerae are clear, no JVD or adenopathy   Abdomen:     Normal bowel sounds, soft non-tender, non-distended, no HSM   Extremities:   Moves all extremities well, no edema, no cyanosis, no             Redness, no rash     Medication Review:        citalopram 20 mg Oral Daily   dexamethasone 1 mg Oral Q8H   famotidine 40 mg Oral Daily   hydrOXYzine 10 mg Oral Nightly   lidocaine viscous 5 mL Mouth/Throat TID AC   LORazepam 1 mg Oral Q12H   losartan 50 mg Oral Daily   metoprolol succinate XL 25 mg Oral Q24H   multivitamin with minerals 1 tablet Oral Daily   potassium chloride 20 mEq Oral Daily   sodium chloride 3 mL Intravenous Q12H   spironolactone 25 mg Oral Daily            I reviewed the patient's new clinical results.  I personally viewed and interpreted the patient's EKG/Telemetry data    Assessment/Plan  Active Hospital Problems    Diagnosis  POA   • **Dizziness [R42]  Yes   • Cardiomyopathy (CMS/HCC) [I42.9]  Unknown   • Cerebrovascular disease [I67.9]  Unknown   • Impaired functional mobility, balance, gait, and endurance [Z74.09]  Yes   • Moderate protein-calorie malnutrition (CMS/HCC) [E44.0]  Yes "   • Traumatic hematoma of forehead [S00.83XA]  Unknown   • Heart murmur [R01.1]  Unknown   • Lightheadedness [R42]  Yes   • Falls frequently [R29.6]  Not Applicable   • Dehydration [E86.0]  Yes   • Lichen planus [L43.9]  Yes   • Essential hypertension [I10]  Yes   • Dementia without behavioral disturbance [F03.90]  Yes      Resolved Hospital Problems   No resolved problems to display.       I do not think there is any active cardiac issue that explains her weakness I do not think she needs any further evaluation we will plan on seeing her as needed    Maurilio Wiggins MD  03/18/19  1:12 PM

## 2019-03-18 NOTE — PLAN OF CARE
Problem: Patient Care Overview  Goal: Plan of Care Review   19 0347   Coping/Psychosocial   Plan of Care Reviewed With patient   Plan of Care Review   Progress improving   OTHER   Outcome Summary Patient sleeping on and off this shift. No pain noted. VSS. IV s/l. Will continue to monitor labs and vitals.       Problem: Fall Risk (Adult)  Intervention: Reduce Risk/Promote Restraint Free Environment   19 0315   Safety Management   Environmental Safety Modification assistive device/personal items within reach;clutter free environment maintained;room near unit station;room organization consistent   Safety Promotion/Fall Prevention activity supervised;fall prevention program maintained;nonskid shoes/slippers when out of bed;safety round/check completed   Prevent  Drop/Fall   Safety/Security Measures bed alarm set     Intervention: Review Medications/Identify Contributors to Fall Risk   19 1800   Safety Management   Medication Review/Management medications reviewed       Goal: Absence of Fall   19 0347   Fall Risk (Adult)   Absence of Fall making progress toward outcome       Problem: Skin Injury Risk (Adult)  Goal: Skin Health and Integrity   19 1736   Skin Injury Risk (Adult)   Skin Health and Integrity making progress toward outcome

## 2019-03-18 NOTE — PLAN OF CARE
Problem: Patient Care Overview  Goal: Plan of Care Review   03/18/19 6013   Coping/Psychosocial   Plan of Care Reviewed With patient   OTHER   Outcome Summary pt agreeable to PT This date, good tolerance to household distances Min A 3-wheeled rollator, slowed michelle. posterior lean in inital stance. req cueing. will cont to progress as deshawn.

## 2019-03-18 NOTE — ACP (ADVANCE CARE PLANNING)
I was requested to see patient regarding an Advance Directive.  Patient stated that she already has this document at this time; however, we do not have anything on her chart.

## 2019-03-18 NOTE — DISCHARGE PLACEMENT REQUEST
"Jessica Rosado (89 y.o. Female)     Date of Birth Social Security Number Address Home Phone MRN    02/22/1928  [address] [address] [phone] [id number]    [employer] Mar[employer] Status          [employer]        Admission Date Admission Type Admitting Provider Attending Provider Department, Room/Bed    3/15/19 Elective Nestor Berman MD Dailey, Andrew James, MD [address], [address]/1    Discharge Date Discharge Disposition Discharge Destination                       Attending Provider:  Nestor Berman MD    Allergies:  No Known Allergies    Isolation:  None   Infection:  None   Code Status:  CPR    Ht:  149.9 cm (59.02\")   Wt:  45.4 kg (100 lb 1.4 oz)    Admission Cmt:  None   Principal Problem:  Dizziness [R42]                 Active Insurance as of 3/15/2019     Primary Coverage     Payor Plan Insurance Group Employer/Plan Group    [facility] MEDICARE REPLACEMENT [facility] [id number]     Payor Plan Address Payor Plan Phone Number Payor Plan Fax Number Effective Dates    [address] [address]   1/1/2019 - None Entered    [facility] [id number]       Subscriber Name Subscriber Birth Date Member ID       JESSICA ROSADO 2/22/1928 [id number]                 Emergency Contacts      (Rel.) Home Phone Work Phone Mobile Phone    ALONZOCLARENCE (Son) [phone] -- --    ALONZOMARIE (Son) [phone] -- --              "

## 2019-03-18 NOTE — PROGRESS NOTES
"   LOS: 2 days   Patient Care Team:  Marlen Berman MD as PCP - General (Nephrology)    Chief Complaint/ Reason for encounter: Dehydration, cardiomyopathy, frequent falls      Subjective     Medical history reviewed:  History of Present Illness    Subjective:  Symptoms:  Improved.  She reports weakness.  No shortness of breath, chest pain, chest pressure or anxiety.    Diet:  Poor intake.    Activity level: Impaired due to weakness.    Pain:  She reports no pain.      She feels a little better today, appetite still poor but marginally improved  Tolerating supplements well  Still very unsteady, gait unstable per physical therapy, they are recommending skilled rehab placement, family in agreement    History taken from: Patient and chart    Objective     Vital Signs  Temp:  [98.3 °F (36.8 °C)-98.5 °F (36.9 °C)] 98.3 °F (36.8 °C)  Heart Rate:  [65-69] 65  Resp:  [16] 16  BP: (126-156)/(55-72) 156/72       Wt Readings from Last 1 Encounters:   03/16/19 1445 45.4 kg (100 lb 1.4 oz)   03/16/19 1117 45.4 kg (100 lb)       Objective:  General Appearance:  Comfortable, in no acute distress and not in pain (Chronically ill-appearing, very thin, somewhat cachectic appearing).    Vital signs: (most recent): Blood pressure 156/72, pulse 65, temperature 98.3 °F (36.8 °C), temperature source Oral, resp. rate 16, height 149.9 cm (59.02\"), weight 45.4 kg (100 lb 1.4 oz), SpO2 95 %.  Vital signs are normal.  No fever.    Output: Producing urine.    HEENT: Normal HEENT exam.  (Large left forehead hematoma, left facial hematoma)    Lungs:  Normal effort and normal respiratory rate.  Breath sounds clear to auscultation.  She is not in respiratory distress.  No decreased breath sounds.    Heart: Normal rate.  Regular rhythm.  S1 normal.  No murmur.   Abdomen: Abdomen is soft.  Bowel sounds are normal.   There is no abdominal tenderness.     Extremities: Normal range of motion.    Neurological: Patient is alert and oriented to person, " place and time.    Skin:  Warm and dry.  No rash or cyanosis.                 Results Review:    Intake/Output:   No intake or output data in the 24 hours ending 03/18/19 1155      DATA:  Radiology and Labs:  The following labs independently reviewed by me. Additional labs ordered for tomorrow a.m.  Interval notes, chart personally reviewed by me.   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, large scalp hematoma  New problems include weakness, unstable gait  Discussed with discharge planner, family      Risk/ complexity of medical care/ medical decision making moderate      Labs:   Recent Results (from the past 24 hour(s))   POC Glucose Once    Collection Time: 03/17/19  8:04 PM   Result Value Ref Range    Glucose 148 (H) 70 - 130 mg/dL   Basic Metabolic Panel    Collection Time: 03/18/19  5:53 AM   Result Value Ref Range    Glucose 98 65 - 99 mg/dL    BUN 16 8 - 23 mg/dL    Creatinine 0.37 (L) 0.57 - 1.00 mg/dL    Sodium 132 (L) 136 - 145 mmol/L    Potassium 4.0 3.5 - 5.2 mmol/L    Chloride 96 (L) 98 - 107 mmol/L    CO2 26.7 22.0 - 29.0 mmol/L    Calcium 8.8 8.2 - 9.6 mg/dL    eGFR Non African Amer >150 >60 mL/min/1.73    BUN/Creatinine Ratio 43.2 (H) 7.0 - 25.0    Anion Gap 9.3 mmol/L       Radiology:  Imaging Results (last 24 hours)     ** No results found for the last 24 hours. **             Medications have been reviewed:  Current Facility-Administered Medications   Medication Dose Route Frequency Provider Last Rate Last Dose   • acetaminophen (TYLENOL) tablet 650 mg  650 mg Oral Q4H PRN Nestor Berman MD   650 mg at 03/15/19 2241   • bisacodyl (DULCOLAX) EC tablet 5 mg  5 mg Oral Daily PRN Nestor Berman MD       • citalopram (CeleXA) tablet 20 mg  20 mg Oral Daily Antonella  Nestor Dior MD   20 mg at 03/18/19 0949   • dexamethasone 0.5 MG/5ML solution 1 mg  1 mg Oral Q8H Nestor Berman MD   1 mg at 03/17/19 2105   • famotidine (PEPCID) tablet 40 mg  40 mg Oral Daily Nestor Berman MD   40 mg at 03/18/19 0949   • hydrOXYzine (ATARAX) tablet 10 mg  10 mg Oral Nightly Nestor Berman MD       • lidocaine viscous (XYLOCAINE) 2 % mouth solution 5 mL  5 mL Mouth/Throat TID AC Nestor Berman MD   5 mL at 03/17/19 1839   • LORazepam (ATIVAN) tablet 1 mg  1 mg Oral Q12H Nestor Berman MD   1 mg at 03/18/19 0949   • losartan (COZAAR) tablet 50 mg  50 mg Oral Daily Maurilio Wiggins MD   50 mg at 03/18/19 0949   • melatonin tablet 5 mg  5 mg Oral Nightly PRN Nestor Berman MD       • metoprolol succinate XL (TOPROL-XL) 24 hr tablet 25 mg  25 mg Oral Q24H Compa Villalba MD   25 mg at 03/18/19 0949   • multivitamin with minerals 1 tablet  1 tablet Oral Daily Nestor Berman MD   1 tablet at 03/18/19 0949   • neomycin-bacitracin-polymyxin (NEOSPORIN) ointment   Topical Q12H PRN Nestor Berman MD       • ondansetron (ZOFRAN) injection 4 mg  4 mg Intravenous Q6H PRN Nestor Berman MD       • potassium chloride (MICRO-K) CR capsule 20 mEq  20 mEq Oral Daily Frank Stroud MD   20 mEq at 03/18/19 0949   • sodium chloride 0.9 % flush 3 mL  3 mL Intravenous Q12H Nestor Berman MD   3 mL at 03/17/19 0856   • sodium chloride 0.9 % flush 3-10 mL  3-10 mL Intravenous PRN Nestor Berman MD       • spironolactone (ALDACTONE) tablet 25 mg  25 mg Oral Daily Maurilio Wiggins MD   25 mg at 03/18/19 0948   • traMADol (ULTRAM) tablet 50 mg  50 mg Oral Q6H PRN Nestor Berman MD   50 mg at 03/18/19 0718       ASSESSMENT:   Dizziness/   Lightheadedness, better, secondary to polypharmacy, dehydration, new onset cardiomyopathy  New onset cardiomyopathy, EF 32%, cardiology seeing, on a beta-blocker and ARB    Falls frequently     Dehydration, better after IV fluids    Lichen planus, oral medication regimen ordered    Essential hypertension well-controlled    Anxiety disorder, weaning benzos    Dementia without behavioral disturbance, 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 based on CT           PLAN:   Renal function, volume and electrolytes stable today  Appreciate cardiology and medicine assistance with management  Continue ARB and beta-blocker: BP at goal  Encourage oral fluid intake and nutritional intake as able  PT/OT following, recommending skilled rehab placement  Family also states the patient is not safe to be home by herself  Add hydroxyzine at night to help with sleep, avoid Ambien  Falls precaution  Discharge once rehab bed available      Nestor Berman MD   [facility]   Office phone number: [phone]  Answering service phone number: [phone]    03/18/19  11:55 AM      Dictation performed using Dragon dictation software

## 2019-03-18 NOTE — PROGRESS NOTES
Continued Stay Note  [facility]     Patient Name: Belinda Vega  MRN: [id number]  Today's Date: 3/18/2019    Admit Date: 3/15/2019    Discharge Plan     Row Name 03/18/19 1122       Plan    Plan  [facility] to evaluate- will need [facility] pre-cert    Patient/Family in Agreement with Plan  yes    Plan Comments  Spoke with Dr Nestro Berman.  Family interested in the [facility] for rehab.  Referral called to [facility].  Transfer packet in office.  Will need [facility] pre-cert.  CCP will follow. Mercedes Hernandez RN        Discharge Codes    No documentation.             Mercedes Hernandez RN

## 2019-03-19 PROBLEM — R54 AGE-RELATED PHYSICAL DEBILITY: Status: ACTIVE | Noted: 2019-01-01

## 2019-03-19 NOTE — PROGRESS NOTES
Continued Stay Note  [facility]     Patient Name: Belinda Vega  MRN: [id number]  Today's Date: 3/19/2019    Admit Date: 3/15/2019    Discharge Plan     Row Name 03/19/19 1501       Plan    Plan  [facility]- pre-cert obtained and bed available tomorrow    Patient/Family in Agreement with Plan  yes    Plan Comments  Spoke with Alyssa.  [facility] has pre-cert and can accept tomorrow.  Dr Berman and son, Arley Vega, aware.  Son will transport tomorrow.  Transfer packet in [facility]. Merceeds Hernandez RN        Discharge Codes    No documentation.       Expected Discharge Date and Time     Expected Discharge Date Expected Discharge Time    Mar 20, 2019             Mercedes Hernandez RN

## 2019-03-19 NOTE — THERAPY TREATMENT NOTE
Acute Care - Physical Therapy Treatment Note  [facility]     Patient Name: Belinda Vega  : 1928  MRN: [id number]  Today's Date: 3/19/2019     Date of Referral to PT: 19  Referring Physician: Dr. Berman    Admit Date: 3/15/2019    Visit Dx:    ICD-10-CM ICD-9-CM   1. Impaired functional mobility, balance, gait, and endurance Z74.09 V49.89     Patient Active Problem List   Diagnosis   • Dizziness   • Lightheadedness   • Falls frequently   • Dehydration   • Lichen planus   • Essential hypertension   • Anxiety disorder   • Dementia without behavioral disturbance   • Moderate protein-calorie malnutrition (CMS/HCC)   • Traumatic hematoma of forehead   • Heart murmur   • Cardiomyopathy (CMS/HCC)   • Cerebrovascular disease   • Impaired functional mobility, balance, gait, and endurance       Therapy Treatment    Rehabilitation Treatment Summary     Row Name 19             Treatment Time/Intention    Discipline  physical therapy assistant  -CW      Document Type  therapy note (daily note)  -CW      Subjective Information  complains of;weakness  -CW      Mode of Treatment  physical therapy  -CW      Therapy Frequency (PT Clinical Impression)  daily  -CW      Patient Effort  good  -CW      Existing Precautions/Restrictions  fall  -CW      Recorded by [CW] Zain Alcantar PTA 1932      Row Name 19             Vital Signs    O2 Delivery Pre Treatment  room air  -CW      Recorded by [CW] Zain Alcantar PTA 1932      Row Name 19             Cognitive Assessment/Intervention- PT/OT    Orientation Status (Cognition)  oriented x 3  -CW      Follows Commands (Cognition)  follows one step commands;over 90% accuracy;repetition of directions required  -CW      Safety Deficit (Cognitive)  mild deficit  -CW      Personal Safety Interventions  fall prevention program maintained;gait belt;muscle strengthening facilitated;nonskid shoes/slippers when out of bed  -CW       Recorded by [CW] Zain Alcantar, PTA 03/19/19 0932      Row Name 03/19/19 0900             Bed Mobility Assessment/Treatment    Bed Mobility Assessment/Treatment  supine-sit;sit-supine  -CW      Supine-Sit Emanuel (Bed Mobility)  contact guard  -CW      Sit-Supine Emanuel (Bed Mobility)  contact guard  -CW      Assistive Device (Bed Mobility)  bed rails  -CW      Recorded by [CW] Zain Alcantar, PTA 03/19/19 0932      Row Name 03/19/19 0900             Transfer Assessment/Treatment    Transfer Assessment/Treatment  sit-stand transfer;stand-sit transfer  -CW      Recorded by [CW] Zain Alcantar, PTA 03/19/19 0932      Row Name 03/19/19 0900             Sit-Stand Transfer    Sit-Stand Emanuel (Transfers)  contact guard;minimum assist (75% patient effort)  -CW      Assistive Device (Sit-Stand Transfers)  walker, 4-wheeled  -CW      Recorded by [CW] Zain Aclantar, PTA 03/19/19 0932      Row Name 03/19/19 0900             Stand-Sit Transfer    Stand-Sit Emanuel (Transfers)  contact guard;minimum assist (75% patient effort)  -CW      Assistive Device (Stand-Sit Transfers)  walker, 4-wheeled  -CW      Recorded by [CW] Zain Alcantar, PTA 03/19/19 0932      Row Name 03/19/19 0900             Gait/Stairs Assessment/Training    Emanuel Level (Gait)  contact guard;minimum assist (75% patient effort)  -CW      Assistive Device (Gait)  walker, 4-wheeled  -CW      Distance in Feet (Gait)  120  -CW      Pattern (Gait)  step-through  -CW      Deviations/Abnormal Patterns (Gait)  gait speed decreased  -CW      Bilateral Gait Deviations  forward flexed posture;heel strike decreased  -CW      Recorded by [CW] Zain Alcantar, PTA 03/19/19 0932      Row Name 03/19/19 0900             Positioning and Restraints    Pre-Treatment Position  in bed  -CW      Post Treatment Position  bed  -CW      In Bed  notified nsg;fowlers;call light within reach;encouraged to call for assist;exit  alarm on;with family/caregiver  -CW      Recorded by [CW] Zain Alcantar, PTA 03/19/19 0932      Row Name                Wound 03/15/19 2051 Left anterior;lower;proximal leg surgical    Wound - Properties Group Date first assessed: 03/15/19 [DB] Time first assessed: 2051 [DB] Side: Left [DB] Orientation: anterior;lower;proximal [DB] Location: leg [DB] Type: surgical [DB] Stage, Pressure Injury: other (see comments) [DB], skin cancer removal  Recorded by:  [DB] Shamir Blancas RN 03/16/19 0110    Row Name 03/19/19 0900             Outcome Summary/Treatment Plan (PT)    Anticipated Discharge Disposition (PT)  skilled nursing facility  -CW      Recorded by [CW] Zain Alcantar, PTA 03/19/19 0932        User Key  (r) = Recorded By, (t) = Taken By, (c) = Cosigned By    Initials Name Effective Dates Discipline    DB Shamir Blancas RN 06/16/16 -  Nurse    CW Zain Alcantar, PTA 03/07/18 -  PT          Wound 03/15/19 2051 Left anterior;lower;proximal leg surgical (Active)   Dressing Appearance open to air 3/19/2019 12:31 AM   Closure Open to air 3/19/2019 12:31 AM   Dressing Care, Wound open to air 3/18/2019  9:05 PM           Physical Therapy Education     Title: PT OT SLP Therapies (In Progress)     Topic: Physical Therapy (In Progress)     Point: Mobility training (In Progress)     Learning Progress Summary           Patient Acceptance, E,TB, VU,DU by CW at 3/19/2019  9:32 AM    Acceptance, E, NR by  at 3/18/2019 11:57 AM    Acceptance, E, VU,DU by DO at 3/17/2019  3:46 PM    Acceptance, E, VU,DU,NR by DO at 3/16/2019  2:59 PM   Family Acceptance, E, NR by  at 3/18/2019 11:57 AM    Acceptance, E, VU,DU by DO at 3/17/2019  3:46 PM    Acceptance, E, VU,DU,NR by DO at 3/16/2019  2:59 PM                   Point: Home exercise program (In Progress)     Learning Progress Summary           Patient Acceptance, E,TB, VU,DU by TATIANA at 3/19/2019  9:32 AM    Acceptance, E, NR by JANNET at 3/18/2019 11:57 AM    Acceptance,  E, VU,DU by DO at 3/17/2019  3:46 PM    Acceptance, E, VU,DU,NR by DO at 3/16/2019  2:59 PM   Family Acceptance, E, NR by  at 3/18/2019 11:57 AM    Acceptance, E, VU,DU by DO at 3/17/2019  3:46 PM    Acceptance, E, VU,DU,NR by DO at 3/16/2019  2:59 PM                   Point: Body mechanics (In Progress)     Learning Progress Summary           Patient Acceptance, E,TB, VU,DU by CW at 3/19/2019  9:32 AM    Acceptance, E, NR by LH at 3/18/2019 11:57 AM    Acceptance, E, VU,DU by DO at 3/17/2019  3:46 PM    Acceptance, E, VU,DU,NR by DO at 3/16/2019  2:59 PM   Family Acceptance, E, NR by  at 3/18/2019 11:57 AM    Acceptance, E, VU,DU by DO at 3/17/2019  3:46 PM    Acceptance, E, VU,DU,NR by DO at 3/16/2019  2:59 PM                   Point: Precautions (In Progress)     Learning Progress Summary           Patient Acceptance, E,TB, VU,DU by  at 3/19/2019  9:32 AM    Acceptance, E, NR by  at 3/18/2019 11:57 AM    Acceptance, E, VU,DU by DO at 3/17/2019  3:46 PM    Acceptance, E, VU,DU,NR by DO at 3/16/2019  2:59 PM   Family Acceptance, E, NR by  at 3/18/2019 11:57 AM    Acceptance, E, VU,DU by DO at 3/17/2019  3:46 PM    Acceptance, E, VU,DU,NR by DO at 3/16/2019  2:59 PM                               User Key     Initials Effective Dates Name Provider Type Discipline     04/03/18 -  Brook Gorman, PT Physical Therapist PT    DO 03/07/18 -  Claude Womack, PT Physical Therapist PT     03/07/18 -  Zain Alcantar, PTA Physical Therapy Assistant PT                PT Recommendation and Plan  Anticipated Discharge Disposition (PT): skilled nursing facility  Therapy Frequency (PT Clinical Impression): daily  Outcome Summary/Treatment Plan (PT)  Anticipated Discharge Disposition (PT): skilled nursing facility  Plan of Care Reviewed With: patient  Progress: improving  Outcome Summary: Pt increasing with bed mobility and transfer safety but requires CGA to Min A for amb due to fall risk and weakness in B LE.     Outcome Measures     Row Name 03/19/19 0900 03/18/19 1400 03/18/19 1100       How much help from another person do you currently need...    Turning from your back to your side while in flat bed without using bedrails?  4  -CW  --  4  -LH    Moving from lying on back to sitting on the side of a flat bed without bedrails?  4  -CW  --  3  -LH    Moving to and from a bed to a chair (including a wheelchair)?  3  -CW  --  3  -LH    Standing up from a chair using your arms (e.g., wheelchair, bedside chair)?  3  -CW  --  3  -LH    Climbing 3-5 steps with a railing?  2  -CW  --  2  -LH    To walk in hospital room?  3  -CW  --  3  -LH    AM-PAC 6 Clicks Score  19  -CW  --  18  -LH       How much help from another is currently needed...    Putting on and taking off regular lower body clothing?  --  3  -RP  --    Bathing (including washing, rinsing, and drying)  --  3  -RP  --    Toileting (which includes using toilet bed pan or urinal)  --  2  -RP  --    Putting on and taking off regular upper body clothing  --  3  -RP  --    Taking care of personal grooming (such as brushing teeth)  --  3  -RP  --    Eating meals  --  3  -RP  --    Score  --  17  -RP  --       Functional Assessment    Outcome Measure Options  AM-PAC 6 Clicks Basic Mobility (PT)  -CW  AM-PAC 6 Clicks Daily Activity (OT)  -RP  -PAC 6 Clicks Basic Mobility (PT)  -    Row Name 03/17/19 1500 03/16/19 1500          How much help from another person do you currently need...    Turning from your back to your side while in flat bed without using bedrails?  4  -DO  4  -DO     Moving from lying on back to sitting on the side of a flat bed without bedrails?  3  -DO  3  -DO     Moving to and from a bed to a chair (including a wheelchair)?  3  -DO  3  -DO     Standing up from a chair using your arms (e.g., wheelchair, bedside chair)?  3  -DO  3  -DO     Climbing 3-5 steps with a railing?  2  -DO  2  -DO     To walk in hospital room?  3  -DO  3  -DO     AM-PAC 6  Clicks Score  18  -DO  18  -DO        Functional Assessment    Outcome Measure Options  AM-PAC 6 Clicks Basic Mobility (PT)  -DO  AM-PAC 6 Clicks Basic Mobility (PT)  -DO       User Key  (r) = Recorded By, (t) = Taken By, (c) = Cosigned By    Initials Name Provider Type    LH Brook Gorman, PT Physical Therapist    DO Claude Womack, PT Physical Therapist    CW Zain Alcantar, PTA Physical Therapy Assistant    Danette Hernandez, OT Occupational Therapist         Time Calculation:   PT Charges     Row Name 03/19/19 0934             Time Calculation    Start Time  0910  -CW      Stop Time  0934  -CW      Time Calculation (min)  24 min  -CW      PT Received On  03/19/19  -CW      PT - Next Appointment  03/20/19  -CW        User Key  (r) = Recorded By, (t) = Taken By, (c) = Cosigned By    Initials Name Provider Type    Zain Rodriges, PTA Physical Therapy Assistant        Therapy Charges for Today     Code Description Service Date Service Provider Modifiers Qty    78573474858 HC PT THER PROC EA 15 MIN 3/19/2019 Zain Alcantar PTA GP 2          PT G-Codes  Outcome Measure Options: AM-PAC 6 Clicks Basic Mobility (PT)  AM-PAC 6 Clicks Score: 19  Score: 17    Zain Alcantar PTA  3/19/2019

## 2019-03-19 NOTE — PROGRESS NOTES
"   LOS: 3 days   Patient Care Team:  Marlen Berman MD as PCP - General (Nephrology)    Chief Complaint/ Reason for encounter: Dehydration, cardiomyopathy, frequent falls      Subjective     Medical history reviewed:  History of Present Illness    Subjective:  Symptoms:  Improved.  She reports weakness.  No shortness of breath, chest pain, chest pressure or anxiety.  (She is feeling a little better, still quite weak but slowly better per physical therapy notes  No lightheadedness or dizziness).    Diet:  Poor intake.    Activity level: Impaired due to weakness.    Pain:  She reports no pain.      She feels a little better today, appetite still poor but marginally improved  Tolerating supplements well  Still very unsteady, gait unstable per physical therapy, they are recommending skilled rehab placement, family in agreement    History taken from: Patient and chart    Objective     Vital Signs  Temp:  [98.2 °F (36.8 °C)-98.3 °F (36.8 °C)] 98.2 °F (36.8 °C)  Heart Rate:  [68-71] 68  Resp:  [16] 16  BP: (151-155)/(75-79) 151/75       Wt Readings from Last 1 Encounters:   03/16/19 1445 45.4 kg (100 lb 1.4 oz)   03/16/19 1117 45.4 kg (100 lb)       Objective:  General Appearance:  Comfortable, in no acute distress and not in pain (Chronically ill-appearing, very thin, somewhat cachectic appearing).    Vital signs: (most recent): Blood pressure 151/75, pulse 68, temperature 98.2 °F (36.8 °C), temperature source Oral, resp. rate 16, height 149.9 cm (59.02\"), weight 45.4 kg (100 lb 1.4 oz), SpO2 94 %.  Vital signs are normal.  No fever.    Output: Producing urine.    HEENT: Normal HEENT exam.  (Large left forehead hematoma, left facial hematoma)    Lungs:  Normal effort and normal respiratory rate.  Breath sounds clear to auscultation.  She is not in respiratory distress.  No decreased breath sounds.    Heart: Normal rate.  Regular rhythm.  S1 normal.  No murmur.   Abdomen: Abdomen is soft.  Bowel sounds are normal.   " There is no abdominal tenderness.     Extremities: Normal range of motion.    Neurological: Patient is alert and oriented to person, place and time.    Skin:  Warm and dry.  No rash or cyanosis.                 Results Review:    Intake/Output:   No intake or output data in the 24 hours ending 03/19/19 1553      DATA:  Radiology and Labs:  The following labs independently reviewed by me. Additional labs ordered for tomorrow a.m.  Interval notes, chart personally reviewed by me.   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, large scalp hematoma      Risk/ complexity of medical care/ medical decision making moderate      Labs:   No results found for this or any previous visit (from the past 24 hour(s)).    Radiology:  Imaging Results (last 24 hours)     ** No results found for the last 24 hours. **             Medications have been reviewed:  Current Facility-Administered Medications   Medication Dose Route Frequency Provider Last Rate Last Dose   • acetaminophen (TYLENOL) tablet 650 mg  650 mg Oral Q4H PRN Nestor Berman MD   650 mg at 03/15/19 2241   • bisacodyl (DULCOLAX) EC tablet 5 mg  5 mg Oral Daily PRN Nestor Berman MD       • citalopram (CeleXA) tablet 20 mg  20 mg Oral Daily Nestor Berman MD   20 mg at 03/19/19 0940   • dexamethasone 0.5 MG/5ML solution 1 mg  1 mg Oral Q8H Nestor Berman MD   1 mg at 03/19/19 1422   • famotidine (PEPCID) tablet 20 mg  20 mg Oral Daily Nestor Berman MD   20 mg at 03/19/19 0940   • hydrOXYzine (ATARAX) tablet 10 mg  10 mg Oral Nightly Nestor Berman MD   10 mg at 03/18/19 2101   • lidocaine viscous (XYLOCAINE) 2 % mouth solution 5 mL  5 mL Mouth/Throat TID AC Nestor Berman MD   5 mL at 03/19/19 0941   •  LORazepam (ATIVAN) tablet 1 mg  1 mg Oral Q12H Nestor Berman MD   1 mg at 03/19/19 0940   • losartan (COZAAR) tablet 50 mg  50 mg Oral Daily Maurilio Wgigins MD   50 mg at 03/19/19 0940   • melatonin tablet 5 mg  5 mg Oral Nightly PRN Nestor Berman MD       • metoprolol succinate XL (TOPROL-XL) 24 hr tablet 25 mg  25 mg Oral Q24H Compa Villalba MD   25 mg at 03/19/19 0940   • multivitamin with minerals 1 tablet  1 tablet Oral Daily Nestor Berman MD   1 tablet at 03/19/19 0940   • neomycin-bacitracin-polymyxin (NEOSPORIN) ointment   Topical Q12H PRN Nestor Berman MD       • ondansetron (ZOFRAN) injection 4 mg  4 mg Intravenous Q6H PRN Nestor Berman MD       • potassium chloride (MICRO-K) CR capsule 20 mEq  20 mEq Oral Daily Frank Stroud MD   20 mEq at 03/19/19 0940   • sodium chloride 0.9 % flush 3 mL  3 mL Intravenous Q12H Nestor Berman MD   3 mL at 03/19/19 0941   • sodium chloride 0.9 % flush 3-10 mL  3-10 mL Intravenous PRN Nestor Berman MD       • spironolactone (ALDACTONE) tablet 25 mg  25 mg Oral Daily Maurilio Wiggins MD   25 mg at 03/19/19 0940   • traMADol (ULTRAM) tablet 50 mg  50 mg Oral Q6H PRN Nestor Berman MD   50 mg at 03/18/19 0718       ASSESSMENT:   Dizziness/   Lightheadedness, better, secondary to polypharmacy, dehydration, new onset cardiomyopathy  New onset cardiomyopathy, EF 32%, continue beta-blocker and ARB    Falls frequently, multifactorial    Dehydration, better after IV fluids    Lichen planus, oral medication regimen ordered  Severe malnutrition, on protein supplements    Essential hypertension well-controlled    Anxiety disorder, weaning benzos    Dementia without behavioral disturbance, 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 based on CT           PLAN:   Renal function, volume and electrolytes stable today  Appreciate cardiology and medicine assistance with management  Continue ARB and beta-blocker: BP at goal  Continue to  encourage oral fluid intake and nutritional intake as able  PT/OT following, recommending skilled rehab placement, bed should be ready by tomorrow, discussed with CCP  Family also states the patient is not safe to be home by herself  Continue hydroxyzine at night to help with sleep, avoid Ambien  Falls precaution  Discharge once rehab bed available, probably tomorrow afternoon      Nestor Berman MD   [facility]   Office phone number: [phone]  Answering service phone number: [phone]    03/19/19  3:53 PM      Dictation performed using Dragon dictation software

## 2019-03-19 NOTE — PLAN OF CARE
Problem: Patient Care Overview  Goal: Plan of Care Review  Outcome: Ongoing (interventions implemented as appropriate)   03/19/19 8357   Coping/Psychosocial   Plan of Care Reviewed With patient   Plan of Care Review   Progress improving   OTHER   Outcome Summary Pt increasing with bed mobility and transfer safety but requires CGA to Min A for amb due to fall risk and weakness in B LE.
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
## 2019-03-19 NOTE — PROGRESS NOTES
Name: Belinda Vega ADMIT: 3/15/2019   : 1928  PCP: Marlen Berman MD    MRN: [id number] LOS: 3 days   AGE/SEX: 89 y.o. female  ROOM: [facility]   Subjective   Cc- weakness    Still very weak  Difficulty even with transfers  Working with therapists  Taking supplements  Sleeping currently    ROS  No f/c      Objective   Vital Signs  Temp:  [98.2 °F (36.8 °C)-98.3 °F (36.8 °C)] 98.2 °F (36.8 °C)  Heart Rate:  [62-71] 68  Resp:  [16] 16  BP: (134-155)/(62-79) 151/75  SpO2:  [93 %-95 %] 94 %  on   ;   Device (Oxygen Therapy): room air  Body mass index is 20.2 kg/m².    Physical Exam   Constitutional:   Elderly, chronically ill   HENT:   Bruising on forehead and face from fall   Neck: Neck supple. No tracheal deviation present.   Cardiovascular: Normal rate and regular rhythm.   Murmur heard.  Pulmonary/Chest: Effort normal and breath sounds normal.   Abdominal: Soft. There is no tenderness. There is no guarding.   Genitourinary:   Genitourinary Comments: Deferred    Musculoskeletal:   Muscle atrophy   Neurological:   sleeping   Skin: Skin is warm and dry.       Results Review:       I reviewed the patient's new clinical results.  Results from last 7 days   Lab Units 03/15/19  1539   WBC 10*3/mm3 8.01   HEMOGLOBIN g/dL 11.3*   PLATELETS 10*3/mm3 195     Results from last 7 days   Lab Units 19  0553 19  0443 19  0600 03/15/19  1539   SODIUM mmol/L 132* 133* 133* 134*   POTASSIUM mmol/L 4.0 3.6 3.5 4.4   CHLORIDE mmol/L 96* 93* 93* 94*   CO2 mmol/L 26.7 27.0 27.7 29.9*   BUN mg/dL 16 11 9 13   CREATININE mg/dL 0.37* 0.40* 0.37* 0.42*   GLUCOSE mg/dL 98 103* 118* 91   Estimated Creatinine Clearance: 32.8 mL/min (A) (by C-G formula based on SCr of 0.37 mg/dL (L)).  Results from last 7 days   Lab Units 03/15/19  1539   ALBUMIN g/dL 3.50   BILIRUBIN mg/dL 0.3   ALK PHOS U/L 84   AST (SGOT) U/L 15   ALT (SGPT) U/L 7     Results from last 7 days   Lab Units 19  0553 19  0443 19  0600  03/15/19  1539   CALCIUM mg/dL 8.8 8.5 8.6 9.2   ALBUMIN g/dL  --   --   --  3.50   MAGNESIUM mg/dL  --  2.0 2.0 2.2   PHOSPHORUS mg/dL  --   --   --  3.6         Radiology(recent) No radiology results for the last day    2D ECHO 3/16/19  · Calculated EF = 32%. Estimated EF was in agreement with the calculated EF. Estimated EF appears to be in the range of 31 - 35%. Estimated EF = 32%. Normal left ventricular cavity size and wall thickness noted. There is left ventricular global hypokinesis noted. Left ventricular diastolic dysfunction is noted (grade I a w/high LAP) consistent with impaired relaxation.  · Left atrial volume is severely increased.  · Right atrial cavity size is moderately dilated  · The aortic valve is abnormal in structure. There is severe calcification of the aortic valve.Mild aortic valve regurgitation is present. Mild aortic valve stenosis is present. There is mild low output low gradient aortic valve stenosis Aortic valve mean pressure gradient is 7.0 mmHg. Aortic valve area is 1.2 cm2.  · Severe MAC is present. There is severe bileaflet mitral valve thickening present. Mild mitral valve regurgitation is present. Moderate mitral valve stenosis is present. The mitral valve mean gradient is 7.0 mmHg. The mitral valve peak gradient is 16.3 mmHg. The mitral valve area by continuity equation is 1.1 cm².  · Mild tricuspid valve regurgitation is present. Estimated right ventricular systolic pressure from tricuspid regurgitation is mildly elevated (35-45 mmHg). Calculated right ventricular systolic pressure from tricuspid regurgitation is 44 mmHg.    Labs in chart were reviewed.  Collected Updated Procedure Result Status      03/17/2019 0443 03/17/2019 0603 Vitamin B12 [[id number]]   Blood     Final result Vitamin B-12 921 pg/mL          03/17/2019 0443 03/17/2019 0544 Magnesium [[id number]]   Blood     Final result Magnesium 2.0 mg/dL          03/17/2019 0443 03/17/2019 0544 Basic Metabolic Panel  [[id number]]    (Abnormal)   Blood     Final result Glucose 103 Abnormally high  mg/dL   BUN 11 mg/dL   Creatinine 0.40 Abnormally low  mg/dL   Sodium 133 Abnormally low  mmol/L   Potassium 3.6 mmol/L   Chloride 93 Abnormally low  mmol/L   CO2 27.0 mmol/L   Calcium 8.5 mg/dL   eGFR Non African Am 150 mL/min/1.73   BUN/Creatinine Ratio 27.5 Abnormally high     Anion Gap 13.0 mmol/L          03/16/2019 0600 03/16/2019 0725 Basic Metabolic Panel [[id number]]    (Abnormal)   Blood     Final result Glucose 118 Abnormally high  mg/dL   BUN 9 mg/dL   Creatinine 0.37 Abnormally low  mg/dL   Sodium 133 Abnormally low  mmol/L   Potassium 3.5 mmol/L   Chloride 93 Abnormally low  mmol/L   CO2 27.7 mmol/L   Calcium 8.6 mg/dL   eGFR Non African Am >150 mL/min/1.73   BUN/Creatinine Ratio 24.3    Anion Gap 12.3 mmol/L          03/16/2019 0600 03/16/2019 0725 Magnesium [[id number]]   Blood     Final result Magnesium 2.0 mg/dL          03/16/2019 0600 03/16/2019 1352 TSH [[id number]]   Blood     Final result TSH Baseline 2.860 mIU/mL          03/16/2019 0600 03/16/2019 1352 T4, Free [[id number]]   Blood     Final result Free T4 1.24 ng/dL          03/16/2019 0216 03/16/2019 0250 Urinalysis With Microscopic If Indicated (No Culture) - Urine, Clean Catch [[id number]]    (Abnormal)   Urine, Clean Catch     Final result Color, UA Yellow   Appearance, UA Cloudy Abnormal    pH, UA 8.5 Abnormally high    Specific Gravity, UA 1.013   Glucose, UA Negative   Ketones, UA Negative   Bilirubin, UA Negative   Blood, UA Negative   Protein, UA Negative   Leukocytes, UA Negative   Nitrite, UA Negative   Urobilinogen, UA 0.2 E.U./dL            citalopram 20 mg Oral Daily   dexamethasone 1 mg Oral Q8H   famotidine 20 mg Oral Daily   hydrOXYzine 10 mg Oral Nightly   lidocaine viscous 5 mL Mouth/Throat TID AC   LORazepam 1 mg Oral Q12H   losartan 50 mg Oral Daily   metoprolol succinate XL 25 mg Oral Q24H   multivitamin with minerals 1 tablet Oral Daily    potassium chloride 20 mEq Oral Daily   sodium chloride 3 mL Intravenous Q12H   spironolactone 25 mg Oral Daily      Diet Regular      Assessment/Plan      Active Hospital Problems    Diagnosis  POA   • **Dizziness [R42]  Yes   • Age-related physical debility [R54]  Unknown   • Cardiomyopathy (CMS/HCC) [I42.9]  Unknown   • Cerebrovascular disease [I67.9]  Unknown   • Impaired functional mobility, balance, gait, and endurance [Z74.09]  Yes   • Moderate protein-calorie malnutrition (CMS/HCC) [E44.0]  Yes   • Traumatic hematoma of forehead [S00.83XA]  Yes   • Heart murmur [R01.1]  Yes   • Lightheadedness [R42]  Yes   • Falls frequently [R29.6]  Not Applicable   • Dehydration [E86.0]  Yes   • Lichen planus [L43.9]  Yes   • Essential hypertension [I10]  Yes   • Dementia without behavioral disturbance [F03.90]  Yes      Resolved Hospital Problems   No resolved problems to display.       · PT/OT/CCP- will need rehab  · CT head with fall and head injury-+hematoma but no ICH. Old strokes seen may benefit from ASA but also with risk of bleeding would need to consider later as an outpatient if ambulating better.     · new cardiomyopathy.  Cardiology seenand medications added  · TSH and B12 ok  · Nutrition following, has had significant weight loss related to her Lichen planus and poor intake. On supplementation    DW Dr. Berman    Plans for subacute rehab either today or tomorrow, ok for discharge from medicine perspective           Compa Villalba MD  [facility]  03/19/19  12:24 PM

## 2019-03-20 NOTE — PLAN OF CARE
Problem: Patient Care Overview  Goal: Plan of Care Review  Outcome: Ongoing (interventions implemented as appropriate)   03/20/19 1458   Coping/Psychosocial   Plan of Care Reviewed With patient   Plan of Care Review   Progress improving   OTHER   Outcome Summary Pt has had no c/o of pain this shift. No SOA. Tolerating oral meds. Up w/ assist. Last BM this shift. VSS. D/C this shift to rehab. Will continue to monitor.        Problem: Fall Risk (Adult)  Goal: Absence of Fall  Outcome: Ongoing (interventions implemented as appropriate)   03/20/19 1458   Fall Risk (Adult)   Absence of Fall making progress toward outcome       Problem: Skin Injury Risk (Adult)  Goal: Skin Health and Integrity  Outcome: Ongoing (interventions implemented as appropriate)   03/20/19 1458   Skin Injury Risk (Adult)   Skin Health and Integrity making progress toward outcome       Problem: Nutrition, Imbalanced: Inadequate Oral Intake (Adult)  Goal: Improved Oral Intake  Outcome: Ongoing (interventions implemented as appropriate)   03/20/19 1458   Nutrition, Imbalanced: Inadequate Oral Intake (Adult)   Improved Oral Intake making progress toward outcome     Goal: Prevent Further Weight Loss  Outcome: Ongoing (interventions implemented as appropriate)   03/20/19 1458   Nutrition, Imbalanced: Inadequate Oral Intake (Adult)   Prevent Further Weight Loss making progress toward outcome

## 2019-03-20 NOTE — SIGNIFICANT NOTE
03/20/19 0916   Rehab Treatment   Discipline physical therapy assistant   Reason Treatment Not Performed (Pt to d/c to SNF today)

## 2019-03-20 NOTE — PROGRESS NOTES
Continued Stay Note  [facility]     Patient Name: Belinda Vega  MRN: [id number]  Today's Date: 3/20/2019    Admit Date: 3/15/2019    Discharge Plan     Row Name 03/20/19 1435       Plan    Plan  [facility].    Patient/Family in Agreement with Plan  yes    Plan Comments  Spoke with Alyssa/Vaughn at [facility] has skilled bed available.  Family will transport.  Packet to [facility].  BHumeniuk RN       Expected Discharge Date and Time     Expected Discharge Date Expected Discharge Time    Mar 20, 2019             Becky S. Humeniuk, RN

## 2019-03-20 NOTE — PROGRESS NOTES
Name: Belinda Vega ADMIT: 3/15/2019   : 1928  PCP: Marlen Berman MD    MRN: [id number] LOS: 4 days   AGE/SEX: 89 y.o. female  ROOM: [facility]   Subjective   Cc- weakness    Still weak  Working with therapists  Taking supplements  Denies any soa    ROS  No f/c      Objective   Vital Signs  Temp:  [98.3 °F (36.8 °C)] 98.3 °F (36.8 °C)  Heart Rate:  [58-86] 86  Resp:  [16-18] 18  BP: (118-155)/(51-69) 151/58  SpO2:  [89 %-94 %] 89 %  on   ;   Device (Oxygen Therapy): room air  Body mass index is 20.2 kg/m².    Physical Exam   Constitutional:   Elderly, chronically ill   HENT:   Bruising on forehead and face from fall   Neck: Neck supple. No tracheal deviation present.   Cardiovascular: Normal rate and regular rhythm.   Murmur heard.  Pulmonary/Chest: Effort normal and breath sounds normal.   Abdominal: Soft. There is no tenderness. There is no guarding.   Genitourinary:   Genitourinary Comments: Deferred    Musculoskeletal:   Muscle atrophy   Neurological: She is alert.   Skin: Skin is warm and dry.   Psychiatric: She has a normal mood and affect. Her behavior is normal.       Results Review:       I reviewed the patient's new clinical results.  Results from last 7 days   Lab Units 03/15/19  1539   WBC 10*3/mm3 8.01   HEMOGLOBIN g/dL 11.3*   PLATELETS 10*3/mm3 195     Results from last 7 days   Lab Units 19  0553 19  0443 19  0600 03/15/19  1539   SODIUM mmol/L 132* 133* 133* 134*   POTASSIUM mmol/L 4.0 3.6 3.5 4.4   CHLORIDE mmol/L 96* 93* 93* 94*   CO2 mmol/L 26.7 27.0 27.7 29.9*   BUN mg/dL 16 11 9 13   CREATININE mg/dL 0.37* 0.40* 0.37* 0.42*   GLUCOSE mg/dL 98 103* 118* 91   Estimated Creatinine Clearance: 32.8 mL/min (A) (by C-G formula based on SCr of 0.37 mg/dL (L)).  Results from last 7 days   Lab Units 03/15/19  1539   ALBUMIN g/dL 3.50   BILIRUBIN mg/dL 0.3   ALK PHOS U/L 84   AST (SGOT) U/L 15   ALT (SGPT) U/L 7     Results from last 7 days   Lab Units 19  0571  03/17/19 0443 03/16/19  0600 03/15/19  1539   CALCIUM mg/dL 8.8 8.5 8.6 9.2   ALBUMIN g/dL  --   --   --  3.50   MAGNESIUM mg/dL  --  2.0 2.0 2.2   PHOSPHORUS mg/dL  --   --   --  3.6         Radiology(recent) No radiology results for the last day    2D ECHO 3/16/19  · Calculated EF = 32%. Estimated EF was in agreement with the calculated EF. Estimated EF appears to be in the range of 31 - 35%. Estimated EF = 32%. Normal left ventricular cavity size and wall thickness noted. There is left ventricular global hypokinesis noted. Left ventricular diastolic dysfunction is noted (grade I a w/high LAP) consistent with impaired relaxation.  · Left atrial volume is severely increased.  · Right atrial cavity size is moderately dilated  · The aortic valve is abnormal in structure. There is severe calcification of the aortic valve.Mild aortic valve regurgitation is present. Mild aortic valve stenosis is present. There is mild low output low gradient aortic valve stenosis Aortic valve mean pressure gradient is 7.0 mmHg. Aortic valve area is 1.2 cm2.  · Severe MAC is present. There is severe bileaflet mitral valve thickening present. Mild mitral valve regurgitation is present. Moderate mitral valve stenosis is present. The mitral valve mean gradient is 7.0 mmHg. The mitral valve peak gradient is 16.3 mmHg. The mitral valve area by continuity equation is 1.1 cm².  · Mild tricuspid valve regurgitation is present. Estimated right ventricular systolic pressure from tricuspid regurgitation is mildly elevated (35-45 mmHg). Calculated right ventricular systolic pressure from tricuspid regurgitation is 44 mmHg.    Labs in chart were reviewed.  Collected Updated Procedure Result Status      03/17/2019 0443 03/17/2019 0603 Vitamin B12 [[id number]]   Blood     Final result Vitamin B-12 921 pg/mL          03/17/2019 0443 03/17/2019 0544 Magnesium [[id number]]   Blood     Final result Magnesium 2.0 mg/dL          03/17/2019 0443 03/17/2019  0544 Basic Metabolic Panel [[id number]]    (Abnormal)   Blood     Final result Glucose 103 Abnormally high  mg/dL   BUN 11 mg/dL   Creatinine 0.40 Abnormally low  mg/dL   Sodium 133 Abnormally low  mmol/L   Potassium 3.6 mmol/L   Chloride 93 Abnormally low  mmol/L   CO2 27.0 mmol/L   Calcium 8.5 mg/dL   eGFR Non African Am 150 mL/min/1.73   BUN/Creatinine Ratio 27.5 Abnormally high     Anion Gap 13.0 mmol/L          03/16/2019 0600 03/16/2019 0725 Basic Metabolic Panel [[id number]]    (Abnormal)   Blood     Final result Glucose 118 Abnormally high  mg/dL   BUN 9 mg/dL   Creatinine 0.37 Abnormally low  mg/dL   Sodium 133 Abnormally low  mmol/L   Potassium 3.5 mmol/L   Chloride 93 Abnormally low  mmol/L   CO2 27.7 mmol/L   Calcium 8.6 mg/dL   eGFR Non African Am >150 mL/min/1.73   BUN/Creatinine Ratio 24.3    Anion Gap 12.3 mmol/L          03/16/2019 0600 03/16/2019 0725 Magnesium [[id number]]   Blood     Final result Magnesium 2.0 mg/dL          03/16/2019 0600 03/16/2019 1352 TSH [[id number]]   Blood     Final result TSH Baseline 2.860 mIU/mL          03/16/2019 0600 03/16/2019 1352 T4, Free [[id number]]   Blood     Final result Free T4 1.24 ng/dL          03/16/2019 0216 03/16/2019 0250 Urinalysis With Microscopic If Indicated (No Culture) - Urine, Clean Catch [[id number]]    (Abnormal)   Urine, Clean Catch     Final result Color, UA Yellow   Appearance, UA Cloudy Abnormal    pH, UA 8.5 Abnormally high    Specific Gravity, UA 1.013   Glucose, UA Negative   Ketones, UA Negative   Bilirubin, UA Negative   Blood, UA Negative   Protein, UA Negative   Leukocytes, UA Negative   Nitrite, UA Negative   Urobilinogen, UA 0.2 E.U./dL            citalopram 20 mg Oral Daily   dexamethasone 1 mg Oral Q8H   famotidine 20 mg Oral Daily   hydrOXYzine 10 mg Oral Nightly   lidocaine viscous 5 mL Mouth/Throat TID AC   LORazepam 1 mg Oral Q12H   losartan 50 mg Oral Daily   metoprolol succinate XL 25 mg Oral Q24H   multivitamin with minerals  1 tablet Oral Daily   potassium chloride 20 mEq Oral Daily   sodium chloride 3 mL Intravenous Q12H   spironolactone 25 mg Oral Daily      Diet Regular      Assessment/Plan      Active Hospital Problems    Diagnosis  POA   • **Dizziness [R42]  Yes   • Age-related physical debility [R54]  Yes   • Cardiomyopathy (CMS/HCC) [I42.9]  Yes   • Cerebrovascular disease [I67.9]  Yes   • Impaired functional mobility, balance, gait, and endurance [Z74.09]  Yes   • Moderate protein-calorie malnutrition (CMS/HCC) [E44.0]  Yes   • Traumatic hematoma of forehead [S00.83XA]  Yes   • Heart murmur [R01.1]  Yes   • Lightheadedness [R42]  Yes   • Falls frequently [R29.6]  Not Applicable   • Dehydration [E86.0]  Yes   • Lichen planus [L43.9]  Yes   • Essential hypertension [I10]  Yes   • Dementia without behavioral disturbance [F03.90]  Yes      Resolved Hospital Problems   No resolved problems to display.       · PT/OT/CCP- will need rehab  · CT head with fall and head injury-+hematoma but no ICH. Old strokes seen may benefit from ASA but also with risk of bleeding would need to consider later as an outpatient if ambulating better.     · new cardiomyopathy.  Cardiology seen and medications added  · TSH and B12 ok  · Nutrition following, has had significant weight loss related to her Lichen planus and poor intake. On supplementation and has met with nutritionist    GOPI RN    Plans for subacute rehab either today ok for discharge from medicine perspective           Compa Villalba MD  [facility]  03/20/19  12:24 PM

## 2019-03-20 NOTE — DISCHARGE SUMMARY
Date of Discharge:  3/20/2019  Weight loss  Dizziness with falls  New onset cardiomyopathy  Dementia  Moderate to severe protein calorie malnutrition  Traumatic hematoma  Lichen planus  Hypertension    Presenting Problem/History of Present Illness  Active Hospital Problems    Diagnosis  POA   • **Dizziness [R42]  Yes   • Age-related physical debility [R54]  Unknown   • Cardiomyopathy (CMS/HCC) [I42.9]  Unknown   • Cerebrovascular disease [I67.9]  Unknown   • Impaired functional mobility, balance, gait, and endurance [Z74.09]  Yes   • Moderate protein-calorie malnutrition (CMS/HCC) [E44.0]  Yes   • Traumatic hematoma of forehead [S00.83XA]  Yes   • Heart murmur [R01.1]  Yes   • Lightheadedness [R42]  Yes   • Falls frequently [R29.6]  Not Applicable   • Dehydration [E86.0]  Yes   • Lichen planus [L43.9]  Yes   • Essential hypertension [I10]  Yes   • Dementia without behavioral disturbance [F03.90]  Yes      Resolved Hospital Problems   No resolved problems to display.      This is a 89 y.o. year old female with a history of lichen planus, previously treated with CellCept, also history of hypertension, anxiety, mild dementia.  Was having frequent falls at home associated with some lightheadedness and occasional dizziness, no loss of consciousness, no injuries from the falls.  She denied any chest pain or shortness of breath associated with her symptoms.  Her oral intake has been low secondary to worsening lichen planus, she has had significant weight loss over the last 1 year.  She denies any fevers or chills, cough or congestion, admitted for fluids, dehydration and possible rehab placement.        Hospital Course  Patient is a 89 y.o. female presented with the above-mentioned problems, increasing lightheadedness, dizziness and gait instability, suffered a fall at home with a left forehead hematoma, CT of the head was negative for intracranial bleed.  2D echocardiogram was done to rule out cardiac cause, it did show  new onset cardiomyopathy with an ejection fraction of 32%, previously normal.  Cardiology was consulted and adjusted her antihypertensive medications as blood pressure was also not well controlled.  She was also weaned off some of her benzodiazepines namely Ambien and I placed on as needed hydroxyzine at night for sleep.  Laboratory evaluation was largely unremarkable other than some mild hypothyroidism, magnesium was normal, albumin 3.5, thyroid studies and vitamin B12 were also normal.  She was mildly anemic with a hemoglobin of 11.3.  Urine studies largely unremarkable.  EKG showed new left bundle branch block.  Due to her comorbidities, advanced age and frailty cardiology recommended no additional workup for her cardiomyopathy and I recommended maximal medical therapy.  She did well, physical therapy and Occupational Therapy were both consulted and did recommend inpatient rehab, discharge was held until a rehab bed could be secured.  Plan will be for her to be discharged to rehab today, family to provide transportation.      Procedures Performed  2D echocardiogram  · Calculated EF = 32%. Estimated EF was in agreement with the calculated EF. Estimated EF appears to be in the range of 31 - 35%. Estimated EF = 32%. Normal left ventricular cavity size and wall thickness noted. There is left ventricular global hypokinesis noted. Left ventricular diastolic dysfunction is noted (grade I a w/high LAP) consistent with impaired relaxation.  · Left atrial volume is severely increased.  · Right atrial cavity size is moderately dilated  · The aortic valve is abnormal in structure. There is severe calcification of the aortic valve.Mild aortic valve regurgitation is present. Mild aortic valve stenosis is present. There is mild low output low gradient aortic valve stenosis Aortic valve mean pressure gradient is 7.0 mmHg. Aortic valve area is 1.2 cm2.  · Severe MAC is present. There is severe bileaflet mitral valve thickening  present. Mild mitral valve regurgitation is present. Moderate mitral valve stenosis is present. The mitral valve mean gradient is 7.0 mmHg. The mitral valve peak gradient is 16.3 mmHg. The mitral valve area by continuity equation is 1.1 cm².  · Mild tricuspid valve regurgitation is present. Estimated right ventricular systolic pressure from tricuspid regurgitation is mildly elevated (35-45 mmHg). Calculated right ventricular systolic pressure from tricuspid regurgitation is 44 mmHg.       Consults:   Consults     Date and Time Order Name Status Description    3/17/2019 0930 Inpatient Cardiology Consult Completed     3/15/2019 1508 Inpatient Internal Medicine Consult Completed           Pertinent Test Results: labs:             Sodium 132, creatinine 0.37  Hemoglobin 11.3  Normal thyroid studies, otherwise normal electrolytes  Negative urinalysis    CT head negative for acute intracranial bleed  EKG new bundle branch block    Condition on Discharge: Stable, improved    Vital Signs  Temp:  [98.3 °F (36.8 °C)] 98.3 °F (36.8 °C)  Heart Rate:  [58-86] 86  Resp:  [16-18] 18  BP: (118-155)/(51-69) 151/58    Physical Exam:     General Appearance:    Alert, cooperative, in no acute distress, frail, chronically ill-appearing   Head:   Large left frontal forehead hematoma with periorbital ecchymosis   Eyes:            Lids and lashes normal, conjunctivae and sclerae normal, no   icterus, no pallor, corneas clear, PERRLA periorbital ecchymosis   Ears:    Ears appear intact with no abnormalities noted   Throat:   No oral lesions, no thrush, oral mucosa moist   Neck:   No adenopathy, supple, trachea midline, no thyromegaly, no     carotid bruit, no JVD   Back:     No kyphosis present, no scoliosis present, no skin lesions,       erythema or scars, no tenderness to percussion or                   palpation,   range of motion normal   Lungs:     Clear to auscultation,respirations regular, even and                   unlabored     Heart:    Regular rhythm and normal rate, normal S1 and S2, no            murmur, no gallop, no rub, no click   Breast Exam:    Deferred   Abdomen:     Normal bowel sounds, no masses, no organomegaly, soft        non-tender, non-distended, no guarding, no rebound                 tenderness   Genitalia:    Deferred   Extremities:   Moves all extremities well, no edema, no cyanosis, no              redness   Pulses:   Pulses palpable and equal bilaterally   Skin:   No bleeding, bruising or rash   Lymph nodes:   No palpable adenopathy   Neurologic:   Cranial nerves 2 - 12 grossly intact, sensation intact, DTR        present and equal bilaterally       Discharge Disposition  Skilled Nursing Facility (DC - External)    Discharge Medications:    Tums 500mg po q6h prn dyspepsia       Discharge Medications      New Medications      Instructions Start Date   famotidine 20 MG tablet  Commonly known as:  PEPCID   20 mg, Oral, Daily   Start Date:  3/21/2019     hydrOXYzine 10 MG tablet  Commonly known as:  ATARAX   10 mg, Oral, Nightly      lidocaine viscous 2 % solution  Commonly known as:  XYLOCAINE   5 mL, Oral, 3 Times Daily Before Meals      losartan 50 MG tablet  Commonly known as:  COZAAR   50 mg, Oral, Daily   Start Date:  3/21/2019     melatonin 5 MG tablet tablet   5 mg, Oral, Nightly PRN      metoprolol succinate XL 25 MG 24 hr tablet  Commonly known as:  TOPROL-XL   25 mg, Oral, Every 24 Hours Scheduled   Start Date:  3/21/2019     multivitamin with minerals tablet tablet   1 tablet, Oral, Daily   Start Date:  3/21/2019     spironolactone 25 MG tablet  Commonly known as:  ALDACTONE   25 mg, Oral, Daily   Start Date:  3/21/2019        Continue These Medications      Instructions Start Date   citalopram 20 MG tablet  Commonly known as:  CeleXA   20 mg, Oral, Daily      dexamethasone 0.5 MG/5ML solution   Oral, Every 6 Hours PRN      LORazepam 1 MG tablet  Commonly known as:  ATIVAN   1 mg, Oral, Every 6 Hours PRN       traMADol 50 MG tablet  Commonly known as:  ULTRAM   50 mg, Oral, Every 6 Hours PRN         Stop These Medications    terazosin 1 MG capsule  Commonly known as:  HYTRIN     ZOLPIDEM TARTRATE PO            Discharge Diet:     Activity at Discharge:   Activity Instructions     Activity as Tolerated            Follow-up Appointments  No future appointments.  Additional Instructions for the Follow-ups that You Need to Schedule     Call MD With Problems / Concerns   As directed      Instructions: worsening dizziness or weakness    Order Comments:  Instructions: worsening dizziness or weakness          Discharge Follow-up with PCP   As directed       Currently Documented PCP:    Marlen Berman MD    PCP Phone Number:    [phone]     Follow Up Details:  1 month               Test Results Pending at Discharge: None       Nestor Berman MD  03/20/19  2:07 PM    Time: Discharge 30 min

## 2019-03-21 NOTE — PROGRESS NOTES
Case Management Discharge Note    Final Note: Patient has been DC'd to a skilled bed at the [facility] via private car 3/20    Destination - Selection Complete      Service Provider Request Status Selected Services Address Phone Number Fax Number    [facility] Skilled Nursing [address], [facility] [address] [phone]           Transportation Services  Other: Other(Private car)    Final Discharge Disposition Code: 03 - skilled nursing facility (SNF)

## 2019-06-07 PROBLEM — I50.43 ACUTE ON CHRONIC COMBINED SYSTOLIC AND DIASTOLIC CHF (CONGESTIVE HEART FAILURE) (HCC): Status: ACTIVE | Noted: 2019-01-01

## 2019-06-07 PROBLEM — J96.01 ACUTE RESPIRATORY FAILURE WITH HYPOXIA (HCC): Status: ACTIVE | Noted: 2019-01-01

## 2019-06-07 PROBLEM — J18.9 POSTOBSTRUCTIVE PNEUMONIA: Status: ACTIVE | Noted: 2019-01-01

## 2019-06-07 PROBLEM — R91.8 LUNG MASS: Status: ACTIVE | Noted: 2019-01-01

## 2019-06-07 PROBLEM — I50.9 CONGESTIVE HEART FAILURE (HCC): Status: ACTIVE | Noted: 2019-01-01

## 2019-06-07 PROBLEM — E87.1 HYPONATREMIA: Status: ACTIVE | Noted: 2019-01-01

## 2019-06-07 NOTE — ED PROVIDER NOTES
Subjective   Belinda Vega is a 89 y.o. female who presents to the ED with complaints of shortness of breath for the past 2 days. The patient has a history of dementia and is a resident at [facility]. The staff at [facility] reports that her oxygen saturation was in the 80s on room air, which is abnormal for her. She had a chest xray performed at the [facility] facility and they believed she had pneumonia. She was prescribed doxycycline and has taken 3 doses. Her son notes that she is also experiencing swelling in her legs. She has a history of cardiomyopathy. She denies a history of lung disease, COPD, and emphysema. There are no other acute complaints at this time.         History provided by:  Patient  History limited by:  Dementia  Shortness of Breath   Severity:  Moderate  Onset quality:  Sudden  Duration:  2 days  Timing:  Constant  Chronicity:  New  Associated symptoms: no abdominal pain, no chest pain, no cough, no diaphoresis, no fever, no sore throat, no vomiting and no wheezing        Review of Systems   Constitutional: Negative for chills, diaphoresis and fever.   HENT: Negative for congestion and sore throat.    Respiratory: Positive for shortness of breath. Negative for cough, choking, chest tightness and wheezing.    Cardiovascular: Negative for chest pain and leg swelling.   Gastrointestinal: Negative for abdominal distention, abdominal pain, anal bleeding, blood in stool, constipation, diarrhea, nausea and vomiting.   Genitourinary: Negative for difficulty urinating, dysuria, flank pain, frequency, hematuria and urgency.   Musculoskeletal:        Lower extremity edema.    All other systems reviewed and are negative.      Past Medical History:   Diagnosis Date   • Cardiomyopathy (CMS/HCC)    • Dementia    • GERD (gastroesophageal reflux disease)    • Hypertension    • Insomnia    • Stroke (CMS/HCC)        No Known Allergies    Past Surgical History:   Procedure Laterality Date    • CHOLECYSTECTOMY         Family History   Problem Relation Age of Onset   • Hypertension Other        Social History     Socioeconomic History   • Marital status:      Spouse name: Not on file   • Number of children: Not on file   • Years of education: Not on file   • Highest education level: Not on file   Tobacco Use   • Smoking status: Never Smoker   • Smokeless tobacco: Never Used   Substance and Sexual Activity   • Alcohol use: No     Frequency: Never   • Drug use: No   • Sexual activity: Defer         Objective   Physical Exam   Constitutional: She is oriented to person, place, and time. She appears well-developed and well-nourished. No distress.   HENT:   Head: Normocephalic and atraumatic.   Eyes: Conjunctivae are normal. No scleral icterus.   Neck: Normal range of motion. Neck supple.   Cardiovascular: Normal rate, regular rhythm and normal heart sounds.   Pulmonary/Chest: Effort normal.   Decreased lung sounds.    Abdominal: Soft. There is no tenderness.   Musculoskeletal: Normal range of motion. She exhibits edema.   Moderate lower extremity edema.    Neurological: She is alert and oriented to person, place, and time.   Skin: Skin is warm and dry.   Psychiatric: She has a normal mood and affect. Her behavior is normal.   Nursing note and vitals reviewed.      Procedures         ED Course  ED Course as of Jun 07 1414 Fri Jun 07, 2019   1348 I had a long sitdown conversation with the son and other family member.  About the CAT scans and the results of the lab work.  I have also relayed information to 2 other family members who are both nephrologists and local.    Pleural effusion and the lung mass.  Patient was apparently admitted to local hospital several months ago and she had a CAT scan there.  But the family is unsure if there was a mass at that time.    Hospitalist paged for admission.  [JM]   1411 I spoke with Dr. Gorman who will admit. Advised to start Zosyn  [JM]      ED Course User  Index  [JM] Elmo Vizcarra APRN       Recent Results (from the past 24 hour(s))   Comprehensive Metabolic Panel    Collection Time: 06/07/19 11:54 AM   Result Value Ref Range    Glucose 96 65 - 99 mg/dL    BUN 13 8 - 23 mg/dL    Creatinine 0.50 (L) 0.57 - 1.00 mg/dL    Sodium 126 (L) 136 - 145 mmol/L    Potassium 4.2 3.5 - 5.2 mmol/L    Chloride 90 (L) 98 - 107 mmol/L    CO2 24.0 22.0 - 29.0 mmol/L    Calcium 8.8 8.2 - 9.6 mg/dL    Total Protein 5.2 (L) 6.0 - 8.5 g/dL    Albumin 3.70 3.50 - 5.20 g/dL    ALT (SGPT) 8 1 - 33 U/L    AST (SGOT) 14 1 - 32 U/L    Alkaline Phosphatase 102 39 - 117 U/L    Total Bilirubin 0.6 0.2 - 1.2 mg/dL    eGFR Non African Amer 116 >60 mL/min/1.73    Globulin 1.5 gm/dL    A/G Ratio 2.5 g/dL    BUN/Creatinine Ratio 26.0 (H) 7.0 - 25.0    Anion Gap 12.0 mmol/L   BNP    Collection Time: 06/07/19 11:54 AM   Result Value Ref Range    proBNP 12,922.0 (H) 5.0-1,800.0 pg/mL   Troponin    Collection Time: 06/07/19 11:54 AM   Result Value Ref Range    Troponin T <0.010 0.000 - 0.030 ng/mL   Light Blue Top    Collection Time: 06/07/19 11:54 AM   Result Value Ref Range    Extra Tube hold for add-on    Green Top (Gel)    Collection Time: 06/07/19 11:54 AM   Result Value Ref Range    Extra Tube Hold for add-ons.    Lavender Top    Collection Time: 06/07/19 11:54 AM   Result Value Ref Range    Extra Tube hold for add-on    Gold Top - SST    Collection Time: 06/07/19 11:54 AM   Result Value Ref Range    Extra Tube Hold for add-ons.    CBC Auto Differential    Collection Time: 06/07/19 11:54 AM   Result Value Ref Range    WBC 9.88 3.40 - 10.80 10*3/mm3    RBC 4.21 3.77 - 5.28 10*6/mm3    Hemoglobin 12.8 12.0 - 15.9 g/dL    Hematocrit 38.2 34.0 - 46.6 %    MCV 90.7 79.0 - 97.0 fL    MCH 30.4 26.6 - 33.0 pg    MCHC 33.5 31.5 - 35.7 g/dL    RDW 13.2 12.3 - 15.4 %    RDW-SD 44.1 37.0 - 54.0 fl    MPV 9.8 6.0 - 12.0 fL    Platelets 299 140 - 450 10*3/mm3    Neutrophil % 57.5 42.7 - 76.0 %    Lymphocyte % 32.6  "19.6 - 45.3 %    Monocyte % 8.2 5.0 - 12.0 %    Eosinophil % 1.1 0.3 - 6.2 %    Basophil % 0.4 0.0 - 1.5 %    Immature Grans % 0.2 0.0 - 0.5 %    Neutrophils, Absolute 5.68 1.70 - 7.00 10*3/mm3    Lymphocytes, Absolute 3.22 (H) 0.70 - 3.10 10*3/mm3    Monocytes, Absolute 0.81 0.10 - 0.90 10*3/mm3    Eosinophils, Absolute 0.11 0.00 - 0.40 10*3/mm3    Basophils, Absolute 0.04 0.00 - 0.20 10*3/mm3    Immature Grans, Absolute 0.02 0.00 - 0.05 10*3/mm3    nRBC 0.0 0.0 - 0.2 /100 WBC     Note: In addition to lab results from this visit, the labs listed above may include labs taken at another facility or during a different encounter within the last 24 hours. Please correlate lab times with ED admission and discharge times for further clarification of the services performed during this visit.    CT Chest Without Contrast   Preliminary Result   Large pleural effusion identified of the right with tiny   pleural effusion on the left with consolidation filling the right middle   lower lung field. Large mass identified anteriorly within the left upper   lobe.              XR Chest 1 View   Preliminary Result   Heart is enlarged with moderate size pleural effusion on the   right. Increased markings seen in the left perihilar region with   increased pulmonary vascularity bilaterally.       D:  06/07/2019   E:  06/07/2019                Vitals:    06/07/19 1142 06/07/19 1148 06/07/19 1149 06/07/19 1245   BP: 127/72   150/89   BP Location: Left arm      Patient Position: Sitting      Pulse:  77  68   Resp: 20      Temp: 97.7 °F (36.5 °C)      TempSrc: Oral      SpO2: (!) 88%  95% 97%   Weight: 49.9 kg (110 lb)      Height: 147.3 cm (58\")        Medications   sodium chloride 0.9 % flush 10 mL (not administered)   piperacillin-tazobactam (ZOSYN) 3.375 g in iso-osmotic dextrose 50 ml (premix) (not administered)   nitroglycerin (NITROSTAT) ointment 1 inch (1 inch Topical Given 6/7/19 7623)   furosemide (LASIX) injection 40 mg (40 mg " Intravenous Given 6/7/19 1328)     ECG/EMG Results (last 24 hours)     Procedure Component Value Units Date/Time    ECG 12 Lead [[id number]] Collected:  06/07/19 1150     Updated:  06/07/19 1153    Narrative:       Test Reason : SOA Protocol  Blood Pressure : **/** mmHG  Vent. Rate : 077 BPM     Atrial Rate : 077 BPM     P-R Int : 140 ms          QRS Dur : 134 ms      QT Int : 458 ms       P-R-T Axes : 054 -34 129 degrees     QTc Int : 518 ms    Normal sinus rhythm  Left axis deviation  Left bundle branch block  Abnormal ECG  No previous ECGs available  Confirmed by SHARLENE DORAN MD ([id number]) on 6/7/2019 11:52:58 AM    Referred By:  LATISHA           Confirmed By:SHARLENE DORAN MD        ECG 12 Lead   Final Result   Test Reason : SOA Protocol   Blood Pressure : **/** mmHG   Vent. Rate : 077 BPM     Atrial Rate : 077 BPM      P-R Int : 140 ms          QRS Dur : 134 ms       QT Int : 458 ms       P-R-T Axes : 054 -34 129 degrees      QTc Int : 518 ms      Normal sinus rhythm   Left axis deviation   Left bundle branch block   Abnormal ECG   No previous ECGs available   Confirmed by SHARLENE DORAN MD ([id number]) on 6/7/2019 11:52:58 AM      Referred By:  EDMD           Confirmed By:SHARLENE DORAN MD            CT Chest Without Contrast   Preliminary Result   Large pleural effusion identified of the right with tiny   pleural effusion on the left with consolidation filling the right middle   lower lung field. Large mass identified anteriorly within the left upper   lobe.              XR Chest 1 View   Preliminary Result   Heart is enlarged with moderate size pleural effusion on the   right. Increased markings seen in the left perihilar region with   increased pulmonary vascularity bilaterally.       D:  06/07/2019   E:  06/07/2019                            MDM    Final diagnoses:   Congestive heart failure, unspecified HF chronicity, unspecified heart failure type (CMS/HCC)   Lung mass   Pneumonia of right middle lobe due to infectious  organism (CMS/[facility])   Pleural effusion   Hypoxia       Documentation assistance provided by braeden Apodaca.  Information recorded by the scribe was done at my direction and has been verified and validated by me.     Ana Apodaca  06/07/19 1231       Elmo Vizcarra APRN  06/07/19 141

## 2019-06-07 NOTE — CONSULTS
Marlen Berman MD  Consulting physician: Sherif    Chief Complaint   Patient presents with   • Shortness of Breath         HPI: Patient is a 89-year-old female brought in hospital due to dyspnea.  Patient has been found to have multiple problems including a new diagnosis of a lung mass concerning for malignancy as well as pneumonia which is possibly postobstructive in nature.  Patient also has history of heart failure with increasing edema.      Past Medical History:   Diagnosis Date   • Cardiomyopathy (CMS/HCC)    • Dementia    • GERD (gastroesophageal reflux disease)    • Hypertension    • Insomnia    • Stroke (CMS/HCC)      Past Surgical History:   Procedure Laterality Date   • CHOLECYSTECTOMY       Current Code Status     Date Active Code Status Order ID Comments User Context       6/7/2019 15:20 No CPR [id number]  Riya Gorman MD Inpatient       Questions for Current Code Status     Question Answer Comment    Code Status No CPR     Medical Interventions (Level of Support Prior to Arrest) Comfort Measures     Level Of Support Discussed With Health Care Surrogate         Current Facility-Administered Medications   Medication Dose Route Frequency Provider Last Rate Last Dose   • acetaminophen (TYLENOL) tablet 650 mg  650 mg Oral Q4H PRN Riya Gorman MD       • calcium carbonate (TUMS) chewable tablet 500 mg (200 mg elemental)  2 tablet Oral BID PRN Riya Gorman MD       • [START ON 6/8/2019] citalopram (CeleXA) tablet 20 mg  20 mg Oral Daily Riya Gorman MD       • enoxaparin (LOVENOX) syringe 40 mg  40 mg Subcutaneous Q24H Riya Gorman MD       • [START ON 6/8/2019] famotidine (PEPCID) tablet 20 mg  20 mg Oral Daily Riya Gorman MD       • furosemide (LASIX) injection 40 mg  40 mg Intravenous BID Riya Gorman MD       • HYDROcodone-acetaminophen (NORCO) 5-325 MG per tablet 1 tablet  1 tablet Oral Q4H PRN Riya Gorman MD       • hydrOXYzine (ATARAX) tablet 10 mg  10 mg Oral Nightly Riya Gorman MD       •  LORazepam (ATIVAN) tablet 0.5 mg  0.5 mg Oral Q6H PRN Riya Gorman MD       • [START ON 6/8/2019] losartan (COZAAR) tablet 50 mg  50 mg Oral Daily Riya Gorman MD       • melatonin tablet 5 mg  5 mg Oral Nightly PRN Riya Gorman MD       • [START ON 6/8/2019] metoprolol succinate XL (TOPROL-XL) 24 hr tablet 25 mg  25 mg Oral Q24H Riya Gorman MD       • ondansetron (ZOFRAN) injection 4 mg  4 mg Intravenous Q6H PRN Riya Gorman MD       • Pharmacy Consult - MTM   Does not apply Daily Rosemary Greer [facility]       • piperacillin-tazobactam (ZOSYN) 3.375 g in iso-osmotic dextrose 50 ml (premix)  3.375 g Intravenous Q8H Riya Gorman MD       • potassium chloride (MICRO-K) CR capsule 40 mEq  40 mEq Oral PRN Riya Gorman MD        Or   • potassium chloride (KLOR-CON) packet 40 mEq  40 mEq Oral PRN Riya Gorman MD        Or   • potassium chloride 10 mEq in 100 mL IVPB  10 mEq Intravenous Q1H PRN Riya Gorman MD       • QUEtiapine (SEROquel) tablet 12.5 mg  12.5 mg Oral Q12H PRN Riya Gorman MD       • sennosides-docusate sodium (SENOKOT-S) 8.6-50 MG tablet 2 tablet  2 tablet Oral Nightly Riya Gorman MD       • sodium chloride 0.9 % flush 10 mL  10 mL Intravenous PRN Nathan Yusuf MD       • sodium chloride 0.9 % flush 3 mL  3 mL Intravenous Q12H Riya Gorman MD       • sodium chloride 0.9 % flush 3-10 mL  3-10 mL Intravenous PRN Riya Gorman MD       • [START ON 6/8/2019] spironolactone (ALDACTONE) tablet 25 mg  25 mg Oral Daily Riya Gorman MD       • traMADol (ULTRAM) tablet 50 mg  50 mg Oral Q6H PRN Riya Gorman MD            •  acetaminophen  •  calcium carbonate  •  HYDROcodone-acetaminophen  •  LORazepam  •  melatonin  •  ondansetron  •  potassium chloride **OR** potassium chloride **OR** potassium chloride  •  QUEtiapine  •  sodium chloride  •  sodium chloride  •  traMADol  No Known Allergies  Family History   Problem Relation Age of Onset   • Hypertension Other      Social History     Socioeconomic History   •  "Marital status:      Spouse name: Not on file   • Number of children: Not on file   • Years of education: Not on file   • Highest education level: Not on file   Tobacco Use   • Smoking status: Never Smoker   • Smokeless tobacco: Never Used   Substance and Sexual Activity   • Alcohol use: No     Frequency: Never   • Drug use: No   • Sexual activity: Defer     Review of Systems -all others reviewed and found negative except as mentioned in the HPI      /73 (BP Location: Right arm, Patient Position: Lying)   Pulse 63   Temp 98.4 °F (36.9 °C) (Oral)   Resp 17   Ht 147.3 cm (58\")   Wt 50 kg (110 lb 2 oz)   SpO2 97%   BMI 23.02 kg/m²     Intake/Output Summary (Last 24 hours) at 6/7/2019 1557  Last data filed at 6/7/2019 1540  Gross per 24 hour   Intake --   Output 250 ml   Net -250 ml     Physical Exam:      General Appearance:    Alert, cooperative, in no acute distress, pleasantly confused   Head:    Normocephalic, without obvious abnormality, atraumatic   Eyes:            Lids and lashes normal, conjunctivae and sclerae normal, no   icterus, no pallor, corneas clear, PERRLA   Ears:    Ears appear intact with no abnormalities noted   Throat:   No oral lesions, no thrush, oral mucosa moist   Neck:   No adenopathy, supple, trachea midline, no thyromegaly, no     carotid bruit, no JVD   Back:     No kyphosis present, no scoliosis present, no skin lesions,       erythema or scars, no tenderness to percussion or                   palpation,   range of motion normal   Lungs:     Clear to auscultation,respirations regular, even and                   unlabored    Heart:    Regular rhythm and normal rate, normal S1 and S2, no            murmur, no gallop, no rub, no click   Breast Exam:    Deferred   Abdomen:     Normal bowel sounds, no masses, no organomegaly, soft        non-tender, non-distended, no guarding, no rebound                 tenderness   Genitalia:    Deferred   Extremities:   + edema   Pulses:  "  Pulses palpable and equal bilaterally   Skin:   No bleeding, bruising or rash   Lymph nodes:   No palpable adenopathy   Neurologic:   Cranial nerves 2 - 12 grossly intact, sensation intact, DTR        present and equal bilaterally       Results from last 7 days   Lab Units 06/07/19  1154   WBC 10*3/mm3 9.88   HEMOGLOBIN g/dL 12.8   HEMATOCRIT % 38.2   PLATELETS 10*3/mm3 299     Results from last 7 days   Lab Units 06/07/19  1154   SODIUM mmol/L 126*   POTASSIUM mmol/L 4.2   CHLORIDE mmol/L 90*   CO2 mmol/L 24.0   BUN mg/dL 13   CREATININE mg/dL 0.50*   CALCIUM mg/dL 8.8   BILIRUBIN mg/dL 0.6   ALK PHOS U/L 102   ALT (SGPT) U/L 8   AST (SGOT) U/L 14   GLUCOSE mg/dL 96     Results from last 7 days   Lab Units 06/07/19  1154   SODIUM mmol/L 126*   POTASSIUM mmol/L 4.2   CHLORIDE mmol/L 90*   CO2 mmol/L 24.0   BUN mg/dL 13   CREATININE mg/dL 0.50*   GLUCOSE mg/dL 96   CALCIUM mg/dL 8.8     Imaging Results (last 72 hours)     Procedure Component Value Units Date/Time    CT Chest Without Contrast [[id number]] Collected:  06/07/19 1331     Updated:  06/07/19 1423    Narrative:       EXAMINATION: CT CHEST WO CONTRAST- 06/07/2019      INDICATION: Pleural effusion, shortness of air     TECHNIQUE: Multiple axial CT imaging was obtained of the chest without  the administration of intravenous contrast.     The radiation dose reduction device was turned on for each scan per the  ALARA (As Low as Reasonably Achievable) protocol.     COMPARISON: NONE     FINDINGS: Extensive edema identified within the subcutaneous tissues.  Tiny left pleural effusion identified with moderate to large right  pleural effusion present with consolidation of the right lung. There is  a large mass identified anteriorly within the left upper lobe. The soft  tissue mass in its largest axial dimension measures 7.2 x 4.7 cm. The  cardiac chambers are enlarged. There is enlargement of the   ascending thoracic aorta. No mediastinal adenopathy or bulky  hilar  adenopathy. Degenerative changes seen within the spine. Upper abdomen is  grossly unremarkable.       Impression:       Large pleural effusion identified of the right with tiny  pleural effusion on the left with consolidation filling the right mid  and lower lung field. Large mass identified anteriorly within the left  upper lobe.     D:  06/07/2019  E:  06/07/2019     This report was finalized on 6/7/2019 2:20 PM by Dr. Lulú Desouza MD.       XR Chest 1 View [[id number]] Collected:  06/07/19 1222     Updated:  06/07/19 1423    Narrative:       EXAMINATION: XR CHEST 1 VW- 06/07/2019      INDICATION: SOA triage protocol      COMPARISON: NONE     FINDINGS: Portable chest reveals enlargement of the heart. There is a  moderate size pleural effusion on the right with ill-defined  opacification seen within the right lung base. Increased markings seen  within the left midlung. Continued follow up recommended. Increased  pulmonary vascularity bilaterally. Degenerative changes seen within the  spine.       Impression:       Heart is enlarged with moderate size pleural effusion on the  right. Increased markings seen in the left perihilar region with  increased pulmonary vascularity bilaterally.     D:  06/07/2019  E:  06/07/2019     This report was finalized on 6/7/2019 2:19 PM by Dr. Lulú Desouza MD.           Impression: CHF  Lung mass  Dyspnea  PNA  GOC      Plan: Did review the notes from the hospitalist.  Does appear that the overall plan is depleted for comfort focus plan of care with possible looking at going back to the nursing home with hospice early next week.  We will follow the patient and continue to adjust medicines for her comfort.        Bonifacio Agustin,   06/07/19  3:57 PM

## 2019-06-07 NOTE — PLAN OF CARE
Problem: Patient Care Overview  Goal: Interprofessional Rounds/Family Conf  Outcome: Ongoing (interventions implemented as appropriate)   06/07/19 1537   Interdisciplinary Rounds/Family Conf   Summary New Palliative consult from Dr. Riya Gorman on 6/7/2019 at 1505 and seen by Dr. Bonifacio Agustin on 6/7/2019 at 1537. Patient awake, hungry, c/o being cold, feels sob, got out of bed to use bathroom without help. Staff placing bed alarms on. Dr. Agustin ordered Hospice consult and she will see on Monday. No family in room. I left Pallitive brochure in room. Patient states her son Arley is who I need to talk to about her. Palliative will continue to follow for support, goals, symptom management and goals.   Participants nursing;physician

## 2019-06-07 NOTE — PLAN OF CARE
Problem: Palliative Care (Adult)  Intervention: Promote Informed Decision Making and Goal Setting   06/07/19 4700   Coping/Psychosocial Interventions   Life Transition/Adjustment palliative care discussed  (left brochure in room for son on palliative)

## 2019-06-07 NOTE — PLAN OF CARE
Problem: Patient Care Overview  Goal: Plan of Care Review  Outcome: Ongoing (interventions implemented as appropriate)   06/07/19 3443   Coping/Psychosocial   Plan of Care Reviewed With patient;son   Plan of Care Review   Progress no change   OTHER   Outcome Summary pt arrived from ED. No skin injuries apparent, some scarring on LLE from previous PU. VSS. Hospice and palliative consults.

## 2019-06-07 NOTE — H&P
[facility] Medicine Services  HISTORY AND PHYSICAL    Patient Name: Belinda Vega  : 1928  MRN: [id number]  Primary Care Physician: Marlen Berman MD    Subjective   Subjective     Chief Complaint:  Congestive heart failure, unspecified HF chronicity, unspecified heart failure type (CMS/HCC) [I50.9]    HPI:  Belinda Vega is a 89 y.o. female with a PMHx significant for dementia, hypertension, cardiomyopathy with known systolic and diastolic CHF (last echo from [facility] hospitalization 2019 EF 32%), GERD, history of stroke who recently was moved into [facility] to be closer to family members.  Noted on vitals at [facility] to be hypoxic 80s percent on room air, CXR at facility showed possible pneumonia she was started on doxycycline, has taken 3 total days without improvement in dyspnea symptoms.  Also associated with slow increase in bilateral lower extremity edema over the past week.    In ED, patient is found to have markedly elevated BNP as well as hyponatremia likely related to dilution of hypovolemia.  CT of her chest to further delineate unfortunate finding of large left upper lobe mass in addition to consolidative infiltrative findings consistent with probable pneumonia.    ROS:   Otherwise complete ROS reviewed and is negative except as mentioned in the HPI.    Personal History     Past Medical History:   Diagnosis Date   • Cardiomyopathy (CMS/HCC)    • Dementia    • GERD (gastroesophageal reflux disease)    • Hypertension    • Insomnia    • Stroke (CMS/HCC)        Past Surgical History:   Procedure Laterality Date   • CHOLECYSTECTOMY         Family History: family history includes Hypertension in her other. Otherwise pertinent FHx was reviewed and unremarkable.     Social History:  reports that she has never smoked. She has never used smokeless tobacco. She reports that she does not drink alcohol or use drugs.  Social History     Social History  Narrative   • Not on file       Medications:    Available home medications reviewed.   Medications Prior to Admission   Medication Sig Dispense Refill Last Dose   • al mag oxide-diphenhydramine-nystatin (MAGIC MOUTHWASH) suspension Take 10 mL by mouth 3 (Three) Times a Day.   6/7/2019 at Unknown time   • calcium carbonate (TUMS) 500 MG chewable tablet Chew 1 tablet As Needed for Indigestion or Heartburn.      • citalopram (CeleXA) 20 MG tablet Take 20 mg by mouth Daily.   6/7/2019 at Unknown time   • dexamethasone 0.5 MG/5ML solution Take  by mouth Every 6 (Six) Hours As Needed.      • doxycycline (VIBRAMYCIN) 100 MG capsule Take 100 mg by mouth 2 (Two) Times a Day.   6/7/2019 at Unknown time   • famotidine (PEPCID) 20 MG tablet Take 1 tablet by mouth Daily. 30 tablet 1 6/7/2019 at Unknown time   • hydrOXYzine (ATARAX) 10 MG tablet Take 1 tablet by mouth Every Night. 30 tablet 3 6/6/2019 at Unknown time   • LORazepam (ATIVAN) 0.5 MG tablet Take 0.5 mg by mouth Every 6 (Six) Hours As Needed for Anxiety.   6/6/2019 at Unknown time   • losartan (COZAAR) 50 MG tablet Take 1 tablet by mouth Daily. 30 tablet 1 6/7/2019 at Unknown time   • melatonin 5 MG tablet tablet Take 1 tablet by mouth At Night As Needed (Insomnia). 30 tablet 1 Past Week at Unknown time   • metoprolol succinate XL (TOPROL-XL) 25 MG 24 hr tablet Take 1 tablet by mouth Daily. 30 tablet 1 6/7/2019 at Unknown time   • Multiple Vitamins-Minerals (MULTIVITAMIN WITH MINERALS) tablet tablet Take 1 tablet by mouth Daily. 30 tablet 1 6/7/2019 at Unknown time   • spironolactone (ALDACTONE) 25 MG tablet Take 1 tablet by mouth Daily. 30 tablet 1 6/7/2019 at Unknown time       No Known Allergies    Objective   Objective     Vital Signs:   Temp:  [97.7 °F (36.5 °C)] 97.7 °F (36.5 °C)  Heart Rate:  [65-77] 72  Resp:  [20] 20  BP: (127-158)/(72-92) 146/84        Physical Exam:   Constitutional: No acute distress, awake, alert, nontoxic, normal body habitus. Patient is  surprisingly not dyspneic, appears comfortable.   Eyes: Pupils equal, sclerae anicteric, no conjunctival injection  HENT: NCAT, mucous membranes moist  Respiratory: Clear to auscultation bilaterally in anterior fields with rales right dependent posterior base and ~LML, good effort, nonlabored respirations on 2.5L O2  Cardiovascular: RRR, 2/6 faint LAYLA best at lower left sternal border  Gastrointestinal: Soft, nontender, nondistended  Musculoskeletal: 2+ pitting peripheral edema to proximal tibias  Psychiatric: Appropriate affect, cooperative  Neurologic: Oriented to self and family, movements symmetric BUE and BLE, Cranial Nerves grossly intact to confrontation, speech clear and fluent  Skin: No rashes, no jaundice, no petechiae, no mottling      Results Reviewed:  I have personally reviewed current lab, radiology, and data and agree.    Results from last 7 days   Lab Units 06/07/19  1154   WBC 10*3/mm3 9.88   HEMOGLOBIN g/dL 12.8   HEMATOCRIT % 38.2   PLATELETS 10*3/mm3 299     Results from last 7 days   Lab Units 06/07/19  1154   SODIUM mmol/L 126*   POTASSIUM mmol/L 4.2   CHLORIDE mmol/L 90*   CO2 mmol/L 24.0   BUN mg/dL 13   CREATININE mg/dL 0.50*   GLUCOSE mg/dL 96   CALCIUM mg/dL 8.8   ALT (SGPT) U/L 8   AST (SGOT) U/L 14     Estimated Creatinine Clearance: 36.1 mL/min (A) (by C-G formula based on SCr of 0.5 mg/dL (L)).  Brief Urine Lab Results  (Last result in the past 365 days)      Color   Clarity   Blood   Leuk Est   Nitrite   Protein   CREAT   Urine HCG        03/16/19 0216 Yellow Cloudy Negative Negative Negative Negative             No results found for: BNP  Imaging Results (last 24 hours)     Procedure Component Value Units Date/Time    CT Chest Without Contrast [[id number]] Collected:  06/07/19 1331     Updated:  06/07/19 1423    Narrative:       EXAMINATION: CT CHEST WO CONTRAST- 06/07/2019      INDICATION: Pleural effusion, shortness of air     TECHNIQUE: Multiple axial CT imaging was obtained of  the chest without  the administration of intravenous contrast.     The radiation dose reduction device was turned on for each scan per the  ALARA (As Low as Reasonably Achievable) protocol.     COMPARISON: NONE     FINDINGS: Extensive edema identified within the subcutaneous tissues.  Tiny left pleural effusion identified with moderate to large right  pleural effusion present with consolidation of the right lung. There is  a large mass identified anteriorly within the left upper lobe. The soft  tissue mass in its largest axial dimension measures 7.2 x 4.7 cm. The  cardiac chambers are enlarged. There is enlargement of the   ascending thoracic aorta. No mediastinal adenopathy or bulky hilar  adenopathy. Degenerative changes seen within the spine. Upper abdomen is  grossly unremarkable.       Impression:       Large pleural effusion identified of the right with tiny  pleural effusion on the left with consolidation filling the right mid  and lower lung field. Large mass identified anteriorly within the left  upper lobe.     D:  06/07/2019  E:  06/07/2019     This report was finalized on 6/7/2019 2:20 PM by Dr. Lulú Desouza MD.       XR Chest 1 View [[id number]] Collected:  06/07/19 1222     Updated:  06/07/19 1423    Narrative:       EXAMINATION: XR CHEST 1 VW- 06/07/2019      INDICATION: SOA triage protocol      COMPARISON: NONE     FINDINGS: Portable chest reveals enlargement of the heart. There is a  moderate size pleural effusion on the right with ill-defined  opacification seen within the right lung base. Increased markings seen  within the left midlung. Continued follow up recommended. Increased  pulmonary vascularity bilaterally. Degenerative changes seen within the  spine.       Impression:       Heart is enlarged with moderate size pleural effusion on the  right. Increased markings seen in the left perihilar region with  increased pulmonary vascularity bilaterally.     D:  06/07/2019  E:  06/07/2019      This report was finalized on 6/7/2019 2:19 PM by Dr. Lulú Desouza MD.           Results for orders placed during the hospital encounter of 03/15/19   Transthoracic Echo Complete With Contrast if Necessary Per Protocol    Narrative · Calculated EF = 32%. Estimated EF was in agreement with the calculated   EF. Estimated EF appears to be in the range of 31 - 35%. Estimated EF =   32%. Normal left ventricular cavity size and wall thickness noted. There   is left ventricular global hypokinesis noted. Left ventricular diastolic   dysfunction is noted (grade I a w/high LAP) consistent with impaired   relaxation.  · Left atrial volume is severely increased.  · Right atrial cavity size is moderately dilated  · The aortic valve is abnormal in structure. There is severe calcification   of the aortic valve.Mild aortic valve regurgitation is present. Mild   aortic valve stenosis is present. There is mild low output low gradient   aortic valve stenosis Aortic valve mean pressure gradient is 7.0 mmHg.   Aortic valve area is 1.2 cm2.  · Severe MAC is present. There is severe bileaflet mitral valve thickening   present. Mild mitral valve regurgitation is present. Moderate mitral valve   stenosis is present. The mitral valve mean gradient is 7.0 mmHg. The   mitral valve peak gradient is 16.3 mmHg. The mitral valve area by   continuity equation is 1.1 cm².  · Mild tricuspid valve regurgitation is present. Estimated right   ventricular systolic pressure from tricuspid regurgitation is mildly   elevated (35-45 mmHg). Calculated right ventricular systolic pressure from   tricuspid regurgitation is 44 mmHg.          Assessment/Plan   Assessment / Plan     Active Hospital Problems    Diagnosis POA   • **Lung mass [R91.8] Yes   • Acute on chronic combined systolic and diastolic CHF (congestive heart failure) (CMS/HCC) [I50.43] Yes   • Acute respiratory failure with hypoxia (CMS/HCC) [J96.01] Yes   • Postobstructive pneumonia  "[J18.9] Yes   • Hyponatremia [E87.1] Yes   • Congestive heart failure (CMS/HCC) [I50.9] Yes   • Age-related physical debility [R54] Yes   • Cardiomyopathy (CMS/HCC) [I42.9] Yes   • Cerebrovascular disease [I67.9] Yes   • Moderate protein-calorie malnutrition (CMS/HCC) [E44.0] Yes   • Dementia without behavioral disturbance [F03.90] Yes   • Essential hypertension [I10] Yes   • Falls frequently [R29.6] Not Applicable       1. Acute hypoxic respiratory failure  -Secondary to #2, #3, #4    2. A/C systolic and diastolic CHF with Pulm HTN andVHD  -Status post IV Lasix in ED  -Continue home spironolactone, also will continue twice daily IV Lasix with strict I/O's and 1.5 L fluid restriction (however in her case will allow regular diet as opposed to cardiac or low salt as patient is specifically asking for \"hamburger and fries\" and ultimately our goals are comfort)    3. 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 Postobstructive pneumonia  -Continue Zosyn for now, transition to p.o. Augmentin for remainder of 7 day duration when shows improvement    5.  Hypervolemic hyponatremia  -Continue with free water diuresis, 1.5 L free water restriction and monitor with am BMP      Discussion:  Due to patient's age, GOC, and comorbidities the family (son at bedside is POA and his daughter and son-in-law are Nephrologist in [place]) has very reasonable expectations and desire comfort based care -->  Family agrees with POC for conservative attempts to diurese and wean O2 as tolerates, although she may now require some chronic O2 ultimatley given the lung reserve displaced by the mass.  We discussed risk vs benefit of thoracentesis and since patient is currently suprisingly subjectively asymptomatic we will not pursue any invasive treatment. She will be aggressively diuresed and also treated for presumed post-obstructive pneumonia (currently on empiric Zosyn but can convert to PO Augmentin in 24-48hrs as " improves). Palliative Medicine has been consulted to follow and help with transition to SNF with Hospice based care (Hospice consult also placed but given it is late Friday afternoon they will likely not assess until Monday to help arrange transfer back to SNF with new GOC).    POA has copy of Living Will and POA paperwork with him.  CODE STATUS discussed and DNR/DNI status ordered with comfort based approach to care and no escalations in care anticipated.       DVT prophylaxis: Lovenox    CODE STATUS:    Code Status and Medical Interventions:   Ordered at: 06/07/19 1520     Level Of Support Discussed With:    Health Care Surrogate     Code Status:    No CPR     Medical Interventions (Level of Support Prior to Arrest):    Comfort Measures       Admission Status:  I believe this patient meets INPATIENT status due to the need for care which can only be reasonably provided in an hospital setting including IV diuresis, serial labs for electrolyte derangements, treatment of pneumonia related to post-obstruction of new mass.  In such, I feel patient’s risk for adverse outcomes and need for care warrant INPATIENT evaluation and predict the patient’s care encounter to likely last beyond 2 midnights.        Electronically signed by Riya Gorman MD, 06/07/19, 2:18 PM.

## 2019-06-07 NOTE — PROGRESS NOTES
Continued Stay Note  [facility]     Patient Name: Belinda Vega  MRN: [id number]  Today's Date: 6/7/2019    Admit Date: 6/7/2019    Discharge Plan     Row Name 06/07/19 1611       Plan    Plan  Undetermined    Plan Comments  Referral received.  Chart reviewed. Plan to meet with pt/family on Monday for hospice overview.  If may be of further assistance, please call [phone].        Discharge Codes    No documentation.             Jaymie Cuellar RN

## 2019-06-08 NOTE — PROGRESS NOTES
[facility] Medicine Services  PROGRESS NOTE    Patient Name: Belinda Vega  : 1928  MRN: [id number]    Date of Admission: 2019  Length of Stay: 1  Primary Care Physician: Marlen Berman MD    Subjective   Subjective     CC:  Shortness of Breath    HPI:  Son wanted to talk today to say they would like to do Thoracentesis.  No family in room on visit, but I was asked by nursing to call him later.    Review of Systems    Gen- No fevers, chills  CV- No chest pain, palpitations  Resp- No cough, dyspnea  GI- No N/V/D, abd pain    Otherwise ROS is negative except as mentioned in the HPI.    Objective   Objective     Vital Signs:   Temp:  [95.4 °F (35.2 °C)-98.5 °F (36.9 °C)] 98.3 °F (36.8 °C)  Heart Rate:  [58-75] 68  Resp:  [14-18] 14  BP: ()/(43-73) 114/67     Physical Exam:    Constitutional: No acute distress, awake, alert  HENT: NCAT, mucous membranes moist  Respiratory: poor inspiratory effort, diminished breath sounds bilaterally  Cardiovascular: RRR, s1 and s2  Gastrointestinal: Positive bowel sounds, soft, nontender, nondistended  Musculoskeletal: No bilateral ankle edema  Psychiatric: Appropriate affect, cooperative  Neurologic: Oriented x 3, strength symmetric in all extremities, Cranial Nerves grossly intact to confrontation, speech clear  Skin: No rashes    Results Reviewed:  I have personally reviewed current lab, radiology, and data and agree.    Results from last 7 days   Lab Units 19  1154   WBC 10*3/mm3 9.88   HEMOGLOBIN g/dL 12.8   HEMATOCRIT % 38.2   PLATELETS 10*3/mm3 299     Results from last 7 days   Lab Units 19  0513 19  1154   SODIUM mmol/L 131* 126*   POTASSIUM mmol/L 3.5 4.2   CHLORIDE mmol/L 90* 90*   CO2 mmol/L 28.0 24.0   BUN mg/dL 13 13   CREATININE mg/dL 0.60 0.50*   GLUCOSE mg/dL 98 96   CALCIUM mg/dL 8.4 8.8   ALT (SGPT) U/L  --  8   AST (SGOT) U/L  --  14   TROPONIN T ng/mL  --  <0.010   PROBNP pg/mL  --  12,922.0*      Estimated Creatinine Clearance: 36.5 mL/min (by C-G formula based on SCr of 0.6 mg/dL).    Microbiology Results Abnormal     Procedure Component Value - Date/Time    Blood Culture - Blood, Arm, Left [[id number]] Collected:  06/07/19 1355    Lab Status:  Preliminary result Specimen:  Blood from Arm, Left Updated:  06/08/19 1431     Blood Culture No growth at 24 hours    Blood Culture - Blood, Arm, Right [[id number]] Collected:  06/07/19 1400    Lab Status:  Preliminary result Specimen:  Blood from Arm, Right Updated:  06/08/19 1431     Blood Culture No growth at 24 hours        Imaging Results (last 24 hours)     ** No results found for the last 24 hours. **        Results for orders placed during the hospital encounter of 03/15/19   Transthoracic Echo Complete With Contrast if Necessary Per Protocol    Narrative · Calculated EF = 32%. Estimated EF was in agreement with the calculated   EF. Estimated EF appears to be in the range of 31 - 35%. Estimated EF =   32%. Normal left ventricular cavity size and wall thickness noted. There   is left ventricular global hypokinesis noted. Left ventricular diastolic   dysfunction is noted (grade I a w/high LAP) consistent with impaired   relaxation.  · Left atrial volume is severely increased.  · Right atrial cavity size is moderately dilated  · The aortic valve is abnormal in structure. There is severe calcification   of the aortic valve.Mild aortic valve regurgitation is present. Mild   aortic valve stenosis is present. There is mild low output low gradient   aortic valve stenosis Aortic valve mean pressure gradient is 7.0 mmHg.   Aortic valve area is 1.2 cm2.  · Severe MAC is present. There is severe bileaflet mitral valve thickening   present. Mild mitral valve regurgitation is present. Moderate mitral valve   stenosis is present. The mitral valve mean gradient is 7.0 mmHg. The   mitral valve peak gradient is 16.3 mmHg. The mitral valve area by   continuity equation is 1.1  cm².  · Mild tricuspid valve regurgitation is present. Estimated right   ventricular systolic pressure from tricuspid regurgitation is mildly   elevated (35-45 mmHg). Calculated right ventricular systolic pressure from   tricuspid regurgitation is 44 mmHg.        I have reviewed the medications:  Scheduled Meds:  citalopram 20 mg Oral Daily   enoxaparin 40 mg Subcutaneous Q24H   famotidine 20 mg Oral Daily   furosemide 40 mg Intravenous BID   hydrOXYzine 10 mg Oral Nightly   losartan 50 mg Oral Daily   metoprolol succinate XL 25 mg Oral Q24H   pharmacy consult - MTM  Does not apply Daily   piperacillin-tazobactam 3.375 g Intravenous Q8H   sennosides-docusate sodium 2 tablet Oral Nightly   sodium chloride 3 mL Intravenous Q12H   spironolactone 25 mg Oral Daily     Continuous Infusions:  hold 1 each     PRN Meds:.•  acetaminophen  •  calcium carbonate  •  hold  •  HYDROcodone-acetaminophen  •  LORazepam  •  melatonin  •  ondansetron  •  potassium chloride **OR** potassium chloride **OR** potassium chloride  •  QUEtiapine  •  sodium chloride  •  sodium chloride  •  traMADol    Assessment/Plan   Assessment / Plan     Active Hospital Problems    Diagnosis POA   • **Lung mass [R91.8] Yes   • Acute on chronic combined systolic and diastolic CHF (congestive heart failure) (CMS/HCC) [I50.43] Yes   • Acute respiratory failure with hypoxia (CMS/HCC) [J96.01] Yes   • Postobstructive pneumonia [J18.9] Yes   • Hyponatremia [E87.1] Yes   • Congestive heart failure (CMS/HCC) [I50.9] Yes   • Age-related physical debility [R54] Yes   • Cardiomyopathy (CMS/HCC) [I42.9] Yes   • Cerebrovascular disease [I67.9] Yes   • Moderate protein-calorie malnutrition (CMS/HCC) [E44.0] Yes   • Dementia without behavioral disturbance [F03.90] Yes   • Essential hypertension [I10] Yes   • Falls frequently [R29.6] Not Applicable     Brief Hospital Course to date:  Belinda Vega is a 89 y.o. 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 Augmentin for remainder of 7 day duration when shows improvement     Hypervolemic hyponatremia  -Continue with free water diuresis, 1.5 L free water restriction and monitor with am BMP    Large Right Pleural Effusion  -CT guided Thoracentesis      DVT Prophylaxis:  Hold Lovenox SC for Thoracentesis    Disposition: I expect the patient to be discharged TBD    CODE STATUS:   Code Status and Medical Interventions:   Ordered at: 06/07/19 1520     Level Of Support Discussed With:    Health Care Surrogate     Code Status:    No CPR     Medical Interventions (Level of Support Prior to Arrest):    Comfort Measures         Electronically signed by José Johnson MD, 06/08/19, 5:14 PM.    "

## 2019-06-08 NOTE — PLAN OF CARE
Problem: Patient Care Overview  Goal: Plan of Care Review   06/08/19 1037   OTHER   Outcome Summary no c/o sob or pain. Pt refused to wear O2 earlier in shift was maintaining O2 sat >86% on RA. 2LO2 placed while sleeping to maintain >90%, pt continues to remove nasal canula in her sleep. No labored breathing. Appears to be resting comfortably at this time.

## 2019-06-09 NOTE — PLAN OF CARE
Problem: Patient Care Overview  Goal: Interprofessional Rounds/Family Conf  Outcome: Ongoing (interventions implemented as appropriate)   06/09/19 1146   Interdisciplinary Rounds/Family Conf   Summary Patient was sleeping at time of visit. I added water to her oxygen. Son is not here at this time. She did not eat breakfast. Palliative will continue to follow for support and discharge needs. When medically ready to [facility] with Hospice.   Participants nursing

## 2019-06-09 NOTE — PLAN OF CARE
Problem: Patient Care Overview  Goal: Interprofessional Rounds/Family Conf  Outcome: Ongoing (interventions implemented as appropriate)   06/08/19 1200   Interdisciplinary Rounds/Family Conf   Summary Patient is awake, she is fussing at her son for making her come to hospital. She wants to go back to [facility] today. Patient is scheduled for CT guided Thoracentesis. Hospice is scheduled to meet with them on Monday.I spoke with Arley and he wants to get her back to [facility] as when she is medically ready for discharge. Palliative will continue to follow for support.   Participants nursing

## 2019-06-09 NOTE — PLAN OF CARE
Problem: Patient Care Overview  Goal: Plan of Care Review  Outcome: Ongoing (interventions implemented as appropriate)   06/09/19 7318   Coping/Psychosocial   Plan of Care Reviewed With patient   Plan of Care Review   Progress no change   OTHER   Outcome Summary VSS. Pt BP lower this morning from lasix. .Pt on 2L nasal cannula. Pt slept most of the morning. Will continue to monitor.       Problem: Fall Risk (Adult)  Goal: Identify Related Risk Factors and Signs and Symptoms  Outcome: Ongoing (interventions implemented as appropriate)    Goal: Absence of Fall  Outcome: Ongoing (interventions implemented as appropriate)      Problem: Skin Injury Risk (Adult)  Goal: Skin Health and Integrity  Outcome: Ongoing (interventions implemented as appropriate)      Problem: Pain, Chronic (Adult)  Goal: Identify Related Risk Factors and Signs and Symptoms  Outcome: Outcome(s) achieved Date Met: 06/09/19    Goal: Acceptable Pain/Comfort Level and Functional Ability  Outcome: Ongoing (interventions implemented as appropriate)

## 2019-06-09 NOTE — PLAN OF CARE
Problem: Patient Care Overview  Goal: Plan of Care Review  Outcome: Ongoing (interventions implemented as appropriate)   06/09/19 0235   Coping/Psychosocial   Plan of Care Reviewed With patient   Plan of Care Review   Progress no change   OTHER   Outcome Summary VSS, pt denies needs, 2LNC, resting comfortably, IV abx cont.       Problem: Fall Risk (Adult)  Goal: Absence of Fall  Outcome: Ongoing (interventions implemented as appropriate)   06/09/19 0235   Fall Risk (Adult)   Absence of Fall making progress toward outcome       Problem: Skin Injury Risk (Adult)  Goal: Skin Health and Integrity  Outcome: Ongoing (interventions implemented as appropriate)   06/09/19 0235   Skin Injury Risk (Adult)   Skin Health and Integrity making progress toward outcome       Problem: Pain, Chronic (Adult)  Goal: Acceptable Pain/Comfort Level and Functional Ability  Outcome: Ongoing (interventions implemented as appropriate)   06/09/19 0235   Pain, Chronic (Adult)   Acceptable Pain/Comfort Level and Functional Ability making progress toward outcome       Problem: Palliative Care (Adult)  Goal: Maximized Comfort  Outcome: Ongoing (interventions implemented as appropriate)   06/09/19 0235   Palliative Care (Adult)   Maximized Comfort making progress toward outcome     Goal: Enhanced Quality of Life  Outcome: Ongoing (interventions implemented as appropriate)   06/09/19 0235   Palliative Care (Adult)   Enhanced Quality of Life making progress toward outcome

## 2019-06-09 NOTE — PROGRESS NOTES
[facility] Medicine Services  PROGRESS NOTE    Patient Name: Belinda Veag  : 1928  MRN: [id number]    Date of Admission: 2019  Length of Stay: 2  Primary Care Physician: Marlen Berman MD    Subjective   Subjective     CC:  Shortness of Breath    HPI:  No overnight events.  No family in room today.  No shortness of breath reported.    Review of Systems    Gen- No fevers, chills  CV- No chest pain, palpitations  Resp- No cough, dyspnea  GI- No N/V/D, abd pain    Otherwise ROS is negative except as mentioned in the HPI.    Objective   Objective     Vital Signs:   Temp:  [95.8 °F (35.4 °C)-98.3 °F (36.8 °C)] 95.8 °F (35.4 °C)  Heart Rate:  [65-72] 65  Resp:  [14-20] 14  BP: ()/(57-84) 112/60     Physical Exam:    Constitutional: No acute distress, awake, alert  HENT: NCAT, mucous membranes moist  Respiratory: poor inspiratory effort, diminished breath sounds bilaterally  Cardiovascular: RRR, s1 and s2  Gastrointestinal: Positive bowel sounds, soft, nontender, nondistended  Musculoskeletal: No bilateral ankle edema  Psychiatric: Appropriate affect, cooperative  Neurologic: Oriented x 3, strength symmetric in all extremities, Cranial Nerves grossly intact to confrontation, speech clear  Skin: No rashes    Results Reviewed:  I have personally reviewed current lab, radiology, and data and agree.    Results from last 7 days   Lab Units 19  0430 19  1154   WBC 10*3/mm3 8.63 9.88   HEMOGLOBIN g/dL 12.2 12.8   HEMATOCRIT % 36.0 38.2   PLATELETS 10*3/mm3 294 299   INR  1.10  --      Results from last 7 days   Lab Units 19  0430 19  0513 19  1154   SODIUM mmol/L 133* 131* 126*   POTASSIUM mmol/L 2.8* 3.5 4.2   CHLORIDE mmol/L 90* 90* 90*   CO2 mmol/L 32.0* 28.0 24.0   BUN mg/dL 11 13 13   CREATININE mg/dL 0.50* 0.60 0.50*   GLUCOSE mg/dL 88 98 96   CALCIUM mg/dL 8.3 8.4 8.8   ALT (SGPT) U/L 8  --  8   AST (SGOT) U/L 15  --  14   TROPONIN T ng/mL  --    --  <0.010   PROBNP pg/mL  --   --  12,922.0*     Estimated Creatinine Clearance: 34.6 mL/min (A) (by C-G formula based on SCr of 0.5 mg/dL (L)).    Microbiology Results Abnormal     Procedure Component Value - Date/Time    Blood Culture - Blood, Arm, Left [[id number]] Collected:  06/07/19 1355    Lab Status:  Preliminary result Specimen:  Blood from Arm, Left Updated:  06/08/19 1431     Blood Culture No growth at 24 hours    Blood Culture - Blood, Arm, Right [[id number]] Collected:  06/07/19 1400    Lab Status:  Preliminary result Specimen:  Blood from Arm, Right Updated:  06/08/19 1431     Blood Culture No growth at 24 hours        Imaging Results (last 24 hours)     ** No results found for the last 24 hours. **        Results for orders placed during the hospital encounter of 03/15/19   Transthoracic Echo Complete With Contrast if Necessary Per Protocol    Narrative · Calculated EF = 32%. Estimated EF was in agreement with the calculated   EF. Estimated EF appears to be in the range of 31 - 35%. Estimated EF =   32%. Normal left ventricular cavity size and wall thickness noted. There   is left ventricular global hypokinesis noted. Left ventricular diastolic   dysfunction is noted (grade I a w/high LAP) consistent with impaired   relaxation.  · Left atrial volume is severely increased.  · Right atrial cavity size is moderately dilated  · The aortic valve is abnormal in structure. There is severe calcification   of the aortic valve.Mild aortic valve regurgitation is present. Mild   aortic valve stenosis is present. There is mild low output low gradient   aortic valve stenosis Aortic valve mean pressure gradient is 7.0 mmHg.   Aortic valve area is 1.2 cm2.  · Severe MAC is present. There is severe bileaflet mitral valve thickening   present. Mild mitral valve regurgitation is present. Moderate mitral valve   stenosis is present. The mitral valve mean gradient is 7.0 mmHg. The   mitral valve peak gradient is 16.3 mmHg.  The mitral valve area by   continuity equation is 1.1 cm².  · Mild tricuspid valve regurgitation is present. Estimated right   ventricular systolic pressure from tricuspid regurgitation is mildly   elevated (35-45 mmHg). Calculated right ventricular systolic pressure from   tricuspid regurgitation is 44 mmHg.        I have reviewed the medications:  Scheduled Meds:    citalopram 20 mg Oral Daily   [START ON 6/11/2019] enoxaparin 40 mg Subcutaneous Q24H   famotidine 20 mg Oral Daily   furosemide 40 mg Intravenous BID   hydrOXYzine 10 mg Oral Nightly   losartan 50 mg Oral Daily   metoprolol succinate XL 25 mg Oral Q24H   pharmacy consult - MTM  Does not apply Daily   piperacillin-tazobactam 3.375 g Intravenous Q8H   sennosides-docusate sodium 2 tablet Oral Nightly   sodium chloride 3 mL Intravenous Q12H   spironolactone 25 mg Oral Daily     Continuous Infusions:    hold 1 each     PRN Meds:.•  hold  •  acetaminophen  •  calcium carbonate  •  HYDROcodone-acetaminophen  •  LORazepam  •  melatonin  •  ondansetron  •  potassium chloride **OR** potassium chloride **OR** potassium chloride  •  potassium chloride **OR** potassium chloride **OR** potassium chloride  •  QUEtiapine  •  sodium chloride  •  sodium chloride  •  traMADol    Assessment/Plan   Assessment / Plan     Active Hospital Problems    Diagnosis POA   • **Lung mass [R91.8] Yes   • Acute on chronic combined systolic and diastolic CHF (congestive heart failure) (CMS/HCC) [I50.43] Yes   • Acute respiratory failure with hypoxia (CMS/HCC) [J96.01] Yes   • Postobstructive pneumonia [J18.9] Yes   • Hyponatremia [E87.1] Yes   • Congestive heart failure (CMS/HCC) [I50.9] Yes   • Age-related physical debility [R54] Yes   • Cardiomyopathy (CMS/HCC) [I42.9] Yes   • Cerebrovascular disease [I67.9] Yes   • Moderate protein-calorie malnutrition (CMS/HCC) [E44.0] Yes   • Dementia without behavioral disturbance [F03.90] Yes   • Essential hypertension [I10] Yes   • Falls  "frequently [R29.6] Not Applicable     Brief Hospital Course to date:  Belinda Vega is a 89 y.o. 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 Augmentin for remainder of 7 day duration when shows improvement     Hypervolemic hyponatremia  -Continue with free water diuresis, 1.5 L free water restriction and monitor with am BMP    Large Right Pleural Effusion  -CT guided Thoracentesis    CT Guided Thoracentesis planned.    DVT Prophylaxis:  Hold Lovenox SC for Thoracentesis    Disposition: I expect the patient to be discharged TBD    CODE STATUS:   Code Status and Medical Interventions:   Ordered at: 06/07/19 1520     Level Of Support Discussed With:    Health Care Surrogate     Code Status:    No CPR     Medical Interventions (Level of Support Prior to Arrest):    Comfort Measures         Electronically signed by José Johnson MD, 06/09/19, 1:43 PM.    "

## 2019-06-10 NOTE — PLAN OF CARE
Problem: Patient Care Overview  Goal: Plan of Care Review  Outcome: Ongoing (interventions implemented as appropriate)   06/10/19 0437   Plan of Care Review   Progress no change   OTHER   Outcome Summary VSS. no complaints of pain. resting comfortably between care. will continue to monitor.        Problem: Fall Risk (Adult)  Goal: Identify Related Risk Factors and Signs and Symptoms  Outcome: Outcome(s) achieved Date Met: 06/10/19   06/09/19 1608   Fall Risk (Adult)   Related Risk Factors (Fall Risk) age-related changes;fear of falling;impaired vision;polypharmacy;history of falls   Signs and Symptoms (Fall Risk) presence of risk factors     Goal: Absence of Fall  Outcome: Ongoing (interventions implemented as appropriate)   06/10/19 0437   Fall Risk (Adult)   Absence of Fall making progress toward outcome       Problem: Skin Injury Risk (Adult)  Goal: Identify Related Risk Factors and Signs and Symptoms  Outcome: Outcome(s) achieved Date Met: 06/10/19   06/10/19 0437   Skin Injury Risk (Adult)   Related Risk Factors (Skin Injury Risk) fluid intake inadequate;edema;infection;mobility impaired;advanced age;tissue perfusion altered     Goal: Skin Health and Integrity  Outcome: Ongoing (interventions implemented as appropriate)   06/10/19 0437   Skin Injury Risk (Adult)   Skin Health and Integrity making progress toward outcome       Problem: Pain, Chronic (Adult)  Goal: Acceptable Pain/Comfort Level and Functional Ability  Outcome: Ongoing (interventions implemented as appropriate)   06/10/19 0437   Pain, Chronic (Adult)   Acceptable Pain/Comfort Level and Functional Ability making progress toward outcome       Problem: Palliative Care (Adult)  Goal: Identify Related Risk Factors and Signs and Symptoms  Outcome: Outcome(s) achieved Date Met: 06/10/19   06/10/19 0437   Palliative Care (Adult)   Palliative Care: Related Risk Factors advanced age;worsening symptoms;condition is progressive   Palliative Care: Signs and  Symptoms loss of appetite     Goal: Maximized Comfort  Outcome: Ongoing (interventions implemented as appropriate)   06/10/19 0437   Palliative Care (Adult)   Maximized Comfort making progress toward outcome     Goal: Enhanced Quality of Life  Outcome: Ongoing (interventions implemented as appropriate)   06/10/19 0437   Palliative Care (Adult)   Enhanced Quality of Life making progress toward outcome

## 2019-06-10 NOTE — PROGRESS NOTES
Discharge Planning Assessment  [facility]     Patient Name: Belinda Vega  MRN: [id number]  Today's Date: 6/10/2019    Admit Date: 6/7/2019    Discharge Needs Assessment     Row Name 06/10/19 1137       Living Environment    Lives With  facility resident    Name(s) of Who Lives With Patient  [facility]     Current Living Arrangements  extended care facility    Primary Care Provided by  other (see comments);child(johnson)    Provides Primary Care For  no one, unable/limited ability to care for self    Family Caregiver if Needed  child(johnson), adult;other (see comments)    Quality of Family Relationships  involved;helpful    Able to Return to Prior Arrangements  yes       Transition Planning    Patient/Family Anticipates Transition to  other (see comments)    Patient/Family Anticipated Services at Transition  none    Transportation Anticipated  family or friend will provide       Discharge Needs Assessment    Readmission Within the Last 30 Days  no previous admission in last 30 days    Concerns to be Addressed  discharge planning    Equipment Currently Used at Home  walker, rolling;shower chair    Anticipated Changes Related to Illness  inability to care for self    Equipment Needed After Discharge  none    Outpatient/Agency/Support Group Needs  home hospice        Discharge Plan     Row Name 06/10/19 1131       Plan    Plan  return home with hospice if accepted    Patient/Family in Agreement with Plan  yes    Plan Comments  Pt lives at [facility]. She requires assistance with most of her ADLs. She uses a rolling walker and showerbench. Pt is followed by her PCP and her medications are covered by insurance. At this time pt and family are considering returning to her facility with hospice services. Cm will follow for any discharge needs if unable to enter hospice.         Destination      No service coordination in this encounter.      Durable Medical Equipment      No service coordination in  this encounter.      Dialysis/Infusion      No service coordination in this encounter.      Home Medical Care      No service coordination in this encounter.      Therapy      No service coordination in this encounter.      Community Resources      No service coordination in this encounter.          Demographic Summary     Row Name 06/10/19 1137       General Information    Admission Type  inpatient    Referral Source  physician    Reason for Consult  discharge planning    General Information Comments  PCP Marlen Berman        Contact Information    Permission Granted to Share Info With  ;family/designee    Contact Information Comments  Arley Vega [phone]        Functional Status     Row Name 06/10/19 1137       Functional Status, IADL    Medications  assistive person    Meal Preparation  assistive person    Housekeeping  assistive person    Laundry  assistive person    Shopping  assistive person       Mental Status    General Appearance WDL  WDL        Psychosocial    No documentation.       Abuse/Neglect    No documentation.       Legal    No documentation.       Substance Abuse    No documentation.       Patient Forms    No documentation.           Suzanne Lunsford RN

## 2019-06-10 NOTE — PLAN OF CARE
Problem: Patient Care Overview  Goal: Interprofessional Rounds/Family Conf  Outcome: Ongoing (interventions implemented as appropriate)  1300 Palliative Team Meeting Attendance: Dr. Bonifacio Agustin, Heaven Simmons APRN, Brook Spence, RN, PN, and Jesusita Gorman LCSW   06/10/19 1300   Interdisciplinary Rounds/Family Conf   Summary Patient awake, she request not to have procedure. Radiologist refused to do procedure due to not enough fluid. Hospice saw patient and spoke to son and he states he wants to presue skill now and do rehab. Dr. Agustin reports that Palliative will sign off. Please reconsult if status changes.   Participants nursing;physician;social work/services;advanced practice nurse

## 2019-06-10 NOTE — PROGRESS NOTES
Continued Stay Note  Psychiatric     Patient Name: Belinda Vega  MRN: [id number]  Today's Date: 6/10/2019    Admit Date: 6/7/2019    Discharge Plan     Row Name 06/10/19 1601       Plan    Plan  Undetermined    Plan Comments  Referral received, chart reviewed. Call placed to Arley, patient son.  Hospice overview completed.  Arley expresses he is still uncertain as to the best course of action for the patient.  He reports she is currently living in assisted living and will require 24 hour caregivers to return.  He is intersted in the RN CM pursuing rehab placement options.  He also wishes the patient to participate in OT/PT for strengthening while she's inpatient at [facility].  Will continue to follow for possible hospice admission if elected.  If may be of further assistance, please call [phone].        Discharge Codes    No documentation.             Jaymie Cuellar RN

## 2019-06-10 NOTE — PROGRESS NOTES
Palliative Care Progress Note    Date of Admission: 6/7/2019    Subjective: Patient with no complaints at this point time.  Son also reports that she is appeared fairly comfortable.  Current Code Status     Date Active Code Status Order ID Comments User Context       6/7/2019 15:20 No CPR [id number]  Riya Gomran MD Inpatient       Questions for Current Code Status     Question Answer Comment    Code Status No CPR     Medical Interventions (Level of Support Prior to Arrest) Comfort Measures     Level Of Support Discussed With Health Care Surrogate         No current facility-administered medications on file prior to encounter.      Current Outpatient Medications on File Prior to Encounter   Medication Sig Dispense Refill   • al mag oxide-diphenhydramine-nystatin (MAGIC MOUTHWASH) suspension Take 10 mL by mouth 3 (Three) Times a Day.     • calcium carbonate (TUMS) 500 MG chewable tablet Chew 1 tablet As Needed for Indigestion or Heartburn.     • citalopram (CeleXA) 20 MG tablet Take 20 mg by mouth Daily.     • dexamethasone 0.5 MG/5ML solution Take  by mouth Every 6 (Six) Hours As Needed.     • doxycycline (VIBRAMYCIN) 100 MG capsule Take 100 mg by mouth 2 (Two) Times a Day.     • famotidine (PEPCID) 20 MG tablet Take 1 tablet by mouth Daily. 30 tablet 1   • hydrOXYzine (ATARAX) 10 MG tablet Take 1 tablet by mouth Every Night. 30 tablet 3   • LORazepam (ATIVAN) 0.5 MG tablet Take 0.5 mg by mouth Every 6 (Six) Hours As Needed for Anxiety.     • losartan (COZAAR) 50 MG tablet Take 1 tablet by mouth Daily. 30 tablet 1   • melatonin 5 MG tablet tablet Take 1 tablet by mouth At Night As Needed (Insomnia). 30 tablet 1   • metoprolol succinate XL (TOPROL-XL) 25 MG 24 hr tablet Take 1 tablet by mouth Daily. 30 tablet 1   • Multiple Vitamins-Minerals (MULTIVITAMIN WITH MINERALS) tablet tablet Take 1 tablet by mouth Daily. 30 tablet 1   • spironolactone (ALDACTONE) 25 MG tablet Take 1 tablet by mouth Daily. 30 tablet 1  "      hold 1 each     •  hold  •  acetaminophen  •  calcium carbonate  •  HYDROcodone-acetaminophen  •  LORazepam  •  melatonin  •  ondansetron  •  potassium chloride **OR** potassium chloride **OR** potassium chloride  •  potassium chloride **OR** potassium chloride **OR** potassium chloride  •  QUEtiapine  •  sodium chloride  •  sodium chloride  •  traMADol    Objective: /54   Pulse 70   Temp 98.5 °F (36.9 °C) (Oral)   Resp 16   Ht 147.3 cm (58\")   Wt 43.8 kg (96 lb 9.6 oz)   SpO2 97%   BMI 20.19 kg/m²      Intake/Output Summary (Last 24 hours) at 6/10/2019 1430  Last data filed at 6/10/2019 1250  Gross per 24 hour   Intake 851 ml   Output 2350 ml   Net -1499 ml     Physical Exam:      General Appearance:    Alert, cooperative, in no acute distress   Head:    Normocephalic, without obvious abnormality, atraumatic   Eyes:            Lids and lashes normal, conjunctivae and sclerae normal, no   icterus, no pallor, corneas clear, PERRLA   Ears:    Ears appear intact with no abnormalities noted   Throat:   No oral lesions, no thrush, oral mucosa moist   Neck:   No adenopathy, supple, trachea midline, no thyromegaly, no     carotid bruit, no JVD   Back:     No kyphosis present, no scoliosis present, no skin lesions,       erythema or scars, no tenderness to percussion or                   palpation,   range of motion normal   Lungs:     Clear to auscultation,respirations regular, even and                   unlabored    Heart:    Regular rhythm and normal rate, normal S1 and S2, no            murmur, no gallop, no rub, no click   Breast Exam:    Deferred   Abdomen:     Normal bowel sounds, no masses, no organomegaly, soft        non-tender, non-distended, no guarding, no rebound                 tenderness   Genitalia:    Deferred   Extremities:   Moves all extremities well, no edema, no cyanosis, no              redness   Pulses:   Pulses palpable and equal bilaterally   Skin:   No bleeding, bruising or " rash   Lymph nodes:   No palpable adenopathy   Neurologic:   Cranial nerves 2 - 12 grossly intact, sensation intact, DTR        present and equal bilaterally     Results from last 7 days   Lab Units 06/10/19  0415   WBC 10*3/mm3 9.09   HEMOGLOBIN g/dL 13.6   HEMATOCRIT % 41.8   PLATELETS 10*3/mm3 298     Results from last 7 days   Lab Units 06/10/19  0415   SODIUM mmol/L 134*   POTASSIUM mmol/L 4.9   CHLORIDE mmol/L 90*   CO2 mmol/L 37.0*   BUN mg/dL 13   CREATININE mg/dL 0.66   CALCIUM mg/dL 9.4   BILIRUBIN mg/dL 0.6   ALK PHOS U/L 96   ALT (SGPT) U/L 8   AST (SGOT) U/L 14   GLUCOSE mg/dL 92       Impression: CHF  Lung mass  Dyspnea  PNA  GOC      Plan: I did talk to the patient as well as the patient's son and other physicians about thoracentesis since this seems to be the big topic of discussion.  Patient herself, during my conversation with her and her son, states that she does not want the thoracentesis.  Son is talking to the patient as he feels that she should get it.  I passed this information on to both hospice as well as the hospitalist.  At this point time symptoms seem to be managed and goals are set.  Palliative medicine will sign off.            Bonifacio Agustin DO  06/10/19  2:30 PM

## 2019-06-10 NOTE — PLAN OF CARE
Problem: Patient Care Overview  Goal: Plan of Care Review  Outcome: Ongoing (interventions implemented as appropriate)   06/10/19 1511   Coping/Psychosocial   Plan of Care Reviewed With patient;spouse   Plan of Care Review   Progress no change   OTHER   Outcome Summary BP has been low all morning, gave bolus per Dr Johnson. Pressures have came back up some. Pt is resting between care, eating her food. Will continue to monitor.       Problem: Skin Injury Risk (Adult)  Goal: Skin Health and Integrity  Outcome: Ongoing (interventions implemented as appropriate)      Problem: Pain, Chronic (Adult)  Goal: Acceptable Pain/Comfort Level and Functional Ability  Outcome: Ongoing (interventions implemented as appropriate)

## 2019-06-10 NOTE — PROGRESS NOTES
[facility] Medicine Services  PROGRESS NOTE    Patient Name: Belinda Vega  : 1928  MRN: [id number]    Date of Admission: 2019  Length of Stay: 3  Primary Care Physician: Marlen Berman MD    Subjective   Subjective     CC:  Shortness of Breath    HPI:  Low blood pressure today.  Diuretics held.  I called son Arley today about current situation    Review of Systems    Gen- No fevers, chills  CV- No chest pain, palpitations  Resp- No cough, dyspnea  GI- No N/V/D, abd pain    Otherwise ROS is negative except as mentioned in the HPI.    Objective   Objective     Vital Signs:   Temp:  [98.5 °F (36.9 °C)-98.9 °F (37.2 °C)] 98.5 °F (36.9 °C)  Heart Rate:  [65-70] 70  Resp:  [16-18] 16  BP: (100-134)/(54-65) 100/54     Physical Exam:    Constitutional: No acute distress, awake, alert  HENT: NCAT, mucous membranes moist  Respiratory: poor inspiratory effort, diminished breath sounds bilaterally  Cardiovascular: RRR, s1 and s2  Gastrointestinal: Positive bowel sounds, soft, nontender, nondistended  Musculoskeletal: No bilateral ankle edema  Psychiatric: Appropriate affect, cooperative  Neurologic: Oriented x 3, strength symmetric in all extremities, Cranial Nerves grossly intact to confrontation, speech clear  Skin: No rashes    Results Reviewed:  I have personally reviewed current lab, radiology, and data and agree.    Results from last 7 days   Lab Units 06/10/19  0415 06/09/19  0430 19  1154   WBC 10*3/mm3 9.09 8.63 9.88   HEMOGLOBIN g/dL 13.6 12.2 12.8   HEMATOCRIT % 41.8 36.0 38.2   PLATELETS 10*3/mm3 298 294 299   INR  1.06 1.10  --      Results from last 7 days   Lab Units 06/10/19  0415 19  2204 19  0430 19  0513 19  1154   SODIUM mmol/L 134*  --  133* 131* 126*   POTASSIUM mmol/L 4.9 4.4 2.8* 3.5 4.2   CHLORIDE mmol/L 90*  --  90* 90* 90*   CO2 mmol/L 37.0*  --  32.0* 28.0 24.0   BUN mg/dL 13  --  11 13 13   CREATININE mg/dL 0.66  --  0.50* 0.60  0.50*   GLUCOSE mg/dL 92  --  88 98 96   CALCIUM mg/dL 9.4  --  8.3 8.4 8.8   ALT (SGPT) U/L 8  --  8  --  8   AST (SGOT) U/L 14  --  15  --  14   TROPONIN T ng/mL  --   --   --   --  <0.010   PROBNP pg/mL 5,443.0*  --   --   --  12,922.0*     Estimated Creatinine Clearance: 31.7 mL/min (by C-G formula based on SCr of 0.66 mg/dL).    Microbiology Results Abnormal     Procedure Component Value - Date/Time    Blood Culture - Blood, Arm, Left [[id number]]  (Abnormal) Collected:  06/07/19 1355    Lab Status:  Preliminary result Specimen:  Blood from Arm, Left Updated:  06/10/19 1213     Blood Culture Abnormal Stain     Gram Stain Aerobic Bottle Gram positive cocci in groups    Blood Culture - Blood, Arm, Right [[id number]] Collected:  06/07/19 1400    Lab Status:  Preliminary result Specimen:  Blood from Arm, Right Updated:  06/09/19 1430     Blood Culture No growth at 2 days        Imaging Results (last 24 hours)     ** No results found for the last 24 hours. **        Results for orders placed during the hospital encounter of 03/15/19   Transthoracic Echo Complete With Contrast if Necessary Per Protocol    Narrative · Calculated EF = 32%. Estimated EF was in agreement with the calculated   EF. Estimated EF appears to be in the range of 31 - 35%. Estimated EF =   32%. Normal left ventricular cavity size and wall thickness noted. There   is left ventricular global hypokinesis noted. Left ventricular diastolic   dysfunction is noted (grade I a w/high LAP) consistent with impaired   relaxation.  · Left atrial volume is severely increased.  · Right atrial cavity size is moderately dilated  · The aortic valve is abnormal in structure. There is severe calcification   of the aortic valve.Mild aortic valve regurgitation is present. Mild   aortic valve stenosis is present. There is mild low output low gradient   aortic valve stenosis Aortic valve mean pressure gradient is 7.0 mmHg.   Aortic valve area is 1.2 cm2.  · Severe MAC is  present. There is severe bileaflet mitral valve thickening   present. Mild mitral valve regurgitation is present. Moderate mitral valve   stenosis is present. The mitral valve mean gradient is 7.0 mmHg. The   mitral valve peak gradient is 16.3 mmHg. The mitral valve area by   continuity equation is 1.1 cm².  · Mild tricuspid valve regurgitation is present. Estimated right   ventricular systolic pressure from tricuspid regurgitation is mildly   elevated (35-45 mmHg). Calculated right ventricular systolic pressure from   tricuspid regurgitation is 44 mmHg.        I have reviewed the medications:  Scheduled Meds:    citalopram 20 mg Oral Daily   [START ON 6/11/2019] enoxaparin 40 mg Subcutaneous Q24H   famotidine 20 mg Oral Daily   hydrOXYzine 10 mg Oral Nightly   losartan 50 mg Oral Daily   metoprolol succinate XL 25 mg Oral Q24H   pharmacy consult - MTM  Does not apply Daily   piperacillin-tazobactam 3.375 g Intravenous Q8H   sennosides-docusate sodium 2 tablet Oral Nightly   sodium chloride 3 mL Intravenous Q12H     Continuous Infusions:    hold 1 each     PRN Meds:.•  hold  •  acetaminophen  •  calcium carbonate  •  HYDROcodone-acetaminophen  •  LORazepam  •  melatonin  •  ondansetron  •  potassium chloride **OR** potassium chloride **OR** potassium chloride  •  potassium chloride **OR** potassium chloride **OR** potassium chloride  •  QUEtiapine  •  sodium chloride  •  sodium chloride  •  traMADol    Assessment/Plan   Assessment / Plan     Active Hospital Problems    Diagnosis POA   • **Lung mass [R91.8] Yes   • Acute on chronic combined systolic and diastolic CHF (congestive heart failure) (CMS/HCC) [I50.43] Yes   • Acute respiratory failure with hypoxia (CMS/HCC) [J96.01] Yes   • Postobstructive pneumonia [J18.9] Yes   • Hyponatremia [E87.1] Yes   • Congestive heart failure (CMS/HCC) [I50.9] Yes   • Age-related physical debility [R54] Yes   • Cardiomyopathy (CMS/HCC) [I42.9] Yes   • Cerebrovascular disease  "[I67.9] Yes   • Moderate protein-calorie malnutrition (CMS/HCC) [E44.0] Yes   • Dementia without behavioral disturbance [F03.90] Yes   • Essential hypertension [I10] Yes   • Falls frequently [R29.6] Not Applicable     Brief Hospital Course to date:  Belinda Vega is a 89 y.o. 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 Augmentin for remainder of 7 day duration when shows improvement     Hypervolemic hyponatremia  -Continue with free water diuresis, 1.5 L free water restriction and monitor with am BMP    Large Right Pleural Effusion  -CT guided Thoracentesis    Stop diuretics   Bolus NS today    Called and discussed case with son Arley today  Discussed case with Dr. Agustin today    CT Guided Thoracentesis planned.    DVT Prophylaxis:  Hold Lovenox SC for Thoracentesis    Disposition: I expect the patient to be discharged TBD    CODE STATUS:   Code Status and Medical Interventions:   Ordered at: 06/07/19 1520     Level Of Support Discussed With:    Health Care Surrogate     Code Status:    No CPR     Medical Interventions (Level of Support Prior to Arrest):    Comfort Measures         Electronically signed by José Johnson MD, 06/10/19, 12:16 PM.    "

## 2019-06-11 NOTE — PLAN OF CARE
Problem: Patient Care Overview  Goal: Plan of Care Review  Outcome: Ongoing (interventions implemented as appropriate)   06/11/19 1125   Coping/Psychosocial   Plan of Care Reviewed With patient   OTHER   Outcome Summary PT evaluation completed on this date. Pt transferred supine-->sit with Eran, stood with ModA, and ambulated 24 feet using rollator with ModAx1. Pt demonstrates a significant posterior lean with standing/ambulation and has difficulty correcting. Skilled PT Services warranted to improve mobility and safety. Recommend SNF at d/c.

## 2019-06-11 NOTE — PROGRESS NOTES
Continued Stay Note  [facility]     Patient Name: Belinda Vega  MRN: [id number]  Today's Date: 6/11/2019    Admit Date: 6/7/2019    Discharge Plan     Row Name 06/11/19 0955       Plan    Plan Comments  CM spoke with pts son Wing who reports family has decided not to consider SNF placement at this time but to have pt return to [facility] with 24/7 caregivers. A sitter list has been provided to him to make needed arrangments.     Row Name 06/11/19 0919       Plan    Plan Comments  CM has been notified family is now interested in rehab after discharge. A VM has been left with pts son Arley to discuss where he would like referrals sent.        Discharge Codes    No documentation.             Suzanne Lunsford RN

## 2019-06-11 NOTE — PROGRESS NOTES
Continued Stay Note  [facility]     Patient Name: Belinda Vega  MRN: [id number]  Today's Date: 6/11/2019    Admit Date: 6/7/2019    Discharge Plan     Row Name 06/11/19 1110       Plan    Plan  [facility]    Plan Comments  Chart reviewed, visit made.  Pt lying in bed, awake and conversant.  No s/s distress noted.  Son not present.  Call placed to Arley who expresses he wishes to decline hospice at this time as he feels this would be a duplication of services.  He plans to hire 24/hr caregivers and for the patient to return to [facility].  Offered to submit records to [facility] who services this area, but he declined stating if/when hospice is necessary, he will request [facility] contacth hospice.  Contact information provided and encouraged him to call hospice with any further questions/concerns.  Will close referral.  If may be of further assistance, please call [phone].    Row Name 06/11/19 0912       Plan    Plan Comments  CM spoke with pts rosamaria Dimas who reports family has decided not to consider SNF placement at this time but to have pt return to [facility] with 24/7 caregivers. A sitter list has been provided to him to make needed arrangments.     Row Name 06/11/19 0953       Plan    Plan Comments  CM has been notified family is now interested in rehab after discharge. A VM has been left with pts rosamaria Tubbs to discuss where he would like referrals sent.        Discharge Codes    No documentation.             Jaymie Cuellar RN

## 2019-06-11 NOTE — PLAN OF CARE
Problem: Fall Risk (Adult)  Goal: Absence of Fall  Outcome: Ongoing (interventions implemented as appropriate)   06/11/19 0418   Fall Risk (Adult)   Absence of Fall making progress toward outcome       Problem: Skin Injury Risk (Adult)  Goal: Skin Health and Integrity  Outcome: Ongoing (interventions implemented as appropriate)   06/11/19 0418   Skin Injury Risk (Adult)   Skin Health and Integrity making progress toward outcome       Problem: Pain, Chronic (Adult)  Goal: Acceptable Pain/Comfort Level and Functional Ability  Outcome: Ongoing (interventions implemented as appropriate)   06/11/19 0418   Pain, Chronic (Adult)   Acceptable Pain/Comfort Level and Functional Ability making progress toward outcome       Problem: Palliative Care (Adult)  Goal: Maximized Comfort  Outcome: Ongoing (interventions implemented as appropriate)   06/11/19 0418   Palliative Care (Adult)   Maximized Comfort making progress toward outcome     Goal: Enhanced Quality of Life  Outcome: Ongoing (interventions implemented as appropriate)   06/11/19 0418   Palliative Care (Adult)   Enhanced Quality of Life making progress toward outcome

## 2019-06-11 NOTE — PROGRESS NOTES
Continued Stay Note  [facility]     Patient Name: Belinda Vega  MRN: [id number]  Today's Date: 6/11/2019    Admit Date: 6/7/2019    Discharge Plan     Row Name 06/11/19 0919       Plan    Plan Comments  CM has been notified family is now interested in rehab after discharge. A VM has been left with pts son Arley to discuss where he would like referrals sent.        Discharge Codes    No documentation.             Suzanne Lunsford RN

## 2019-06-11 NOTE — PROGRESS NOTES
[facility] Medicine Services  PROGRESS NOTE    Patient Name: Belinda Vega  : 1928  MRN: [id number]    Date of Admission: 2019  Length of Stay: 4  Primary Care Physician: Marlen Berman MD    Subjective   Subjective     CC:  Shortness of Breath    HPI:  AOx1.  Has history of dementia.  Discussed case with son Arley caldwell -POA.  Would like Thoracentesis to be done     Review of Systems    Gen- No fevers, chills  CV- No chest pain, palpitations  Resp- No cough, dyspnea  GI- No N/V/D, abd pain    Otherwise ROS is negative except as mentioned in the HPI.    Objective   Objective     Vital Signs:   Temp:  [98.5 °F (36.9 °C)] 98.5 °F (36.9 °C)  Heart Rate:  [74-77] 74  Resp:  [18] 18  BP: ()/(48-64) 122/64     Physical Exam:    Constitutional: No acute distress, awake, alert  HENT: NCAT, mucous membranes moist  Respiratory: poor inspiratory effort, diminished breath sounds bilaterally  Cardiovascular: RRR, s1 and s2  Gastrointestinal: Positive bowel sounds, soft, nontender, nondistended  Musculoskeletal: No bilateral ankle edema  Psychiatric: Appropriate affect, cooperative  Neurologic: AOx1, generalized weakness  Skin: No rashes    Results Reviewed:  I have personally reviewed current lab, radiology, and data and agree.    Results from last 7 days   Lab Units 06/10/19  0415 19  0430 19  1154   WBC 10*3/mm3 9.09 8.63 9.88   HEMOGLOBIN g/dL 13.6 12.2 12.8   HEMATOCRIT % 41.8 36.0 38.2   PLATELETS 10*3/mm3 298 294 299   INR  1.06 1.10  --      Results from last 7 days   Lab Units 06/10/19  0415 19  2204 19  0430 19  0513 19  1154   SODIUM mmol/L 134*  --  133* 131* 126*   POTASSIUM mmol/L 4.9 4.4 2.8* 3.5 4.2   CHLORIDE mmol/L 90*  --  90* 90* 90*   CO2 mmol/L 37.0*  --  32.0* 28.0 24.0   BUN mg/dL 13  --  11 13 13   CREATININE mg/dL 0.66  --  0.50* 0.60 0.50*   GLUCOSE mg/dL 92  --  88 98 96   CALCIUM mg/dL 9.4  --  8.3 8.4 8.8   ALT  (SGPT) U/L 8  --  8  --  8   AST (SGOT) U/L 14  --  15  --  14   TROPONIN T ng/mL  --   --   --   --  <0.010   PROBNP pg/mL 5,443.0*  --   --   --  12,922.0*     Estimated Creatinine Clearance: 31.2 mL/min (by C-G formula based on SCr of 0.66 mg/dL).    Microbiology Results Abnormal     Procedure Component Value - Date/Time    Blood Culture - Blood, Arm, Right [[id number]] Collected:  06/07/19 1400    Lab Status:  Preliminary result Specimen:  Blood from Arm, Right Updated:  06/10/19 1431     Blood Culture No growth at 3 days    Blood Culture ID, PCR - Blood, Arm, Left [[id number]]  (Abnormal) Collected:  06/07/19 1355    Lab Status:  Final result Specimen:  Blood from Arm, Left Updated:  06/10/19 1335     BCID, PCR Staphylococcus spp, not aureus. Identification by BCID PCR.    Blood Culture - Blood, Arm, Left [[id number]]  (Abnormal) Collected:  06/07/19 1355    Lab Status:  Preliminary result Specimen:  Blood from Arm, Left Updated:  06/10/19 1213     Blood Culture Abnormal Stain     Gram Stain Aerobic Bottle Gram positive cocci in groups        Imaging Results (last 24 hours)     ** No results found for the last 24 hours. **        Results for orders placed during the hospital encounter of 03/15/19   Transthoracic Echo Complete With Contrast if Necessary Per Protocol    Narrative · Calculated EF = 32%. Estimated EF was in agreement with the calculated   EF. Estimated EF appears to be in the range of 31 - 35%. Estimated EF =   32%. Normal left ventricular cavity size and wall thickness noted. There   is left ventricular global hypokinesis noted. Left ventricular diastolic   dysfunction is noted (grade I a w/high LAP) consistent with impaired   relaxation.  · Left atrial volume is severely increased.  · Right atrial cavity size is moderately dilated  · The aortic valve is abnormal in structure. There is severe calcification   of the aortic valve.Mild aortic valve regurgitation is present. Mild   aortic valve stenosis  is present. There is mild low output low gradient   aortic valve stenosis Aortic valve mean pressure gradient is 7.0 mmHg.   Aortic valve area is 1.2 cm2.  · Severe MAC is present. There is severe bileaflet mitral valve thickening   present. Mild mitral valve regurgitation is present. Moderate mitral valve   stenosis is present. The mitral valve mean gradient is 7.0 mmHg. The   mitral valve peak gradient is 16.3 mmHg. The mitral valve area by   continuity equation is 1.1 cm².  · Mild tricuspid valve regurgitation is present. Estimated right   ventricular systolic pressure from tricuspid regurgitation is mildly   elevated (35-45 mmHg). Calculated right ventricular systolic pressure from   tricuspid regurgitation is 44 mmHg.        I have reviewed the medications:  Scheduled Meds:    citalopram 20 mg Oral Daily   enoxaparin 40 mg Subcutaneous Q24H   famotidine 20 mg Oral Daily   hydrOXYzine 10 mg Oral Nightly   losartan 50 mg Oral Daily   metoprolol succinate XL 25 mg Oral Q24H   pharmacy consult - MTM  Does not apply Daily   piperacillin-tazobactam 3.375 g Intravenous Q8H   sennosides-docusate sodium 2 tablet Oral Nightly   sodium chloride 3 mL Intravenous Q12H     Continuous Infusions:    hold 1 each     PRN Meds:.•  hold  •  acetaminophen  •  calcium carbonate  •  HYDROcodone-acetaminophen  •  LORazepam  •  melatonin  •  ondansetron  •  potassium chloride **OR** potassium chloride **OR** potassium chloride  •  potassium chloride **OR** potassium chloride **OR** potassium chloride  •  QUEtiapine  •  sodium chloride  •  sodium chloride  •  traMADol    Assessment/Plan   Assessment / Plan     Active Hospital Problems    Diagnosis POA   • **Lung mass [R91.8] Yes   • Acute on chronic combined systolic and diastolic CHF (congestive heart failure) (CMS/HCC) [I50.43] Yes   • Acute respiratory failure with hypoxia (CMS/[facility], [facility]) [J96.01] Yes   • Postobstructive pneumonia [J18.9] Yes   • Hyponatremia [E87.1] Yes   • Congestive  "heart failure (CMS/HCC) [I50.9] Yes   • Age-related physical debility [R54] Yes   • Cardiomyopathy (CMS/HCC) [I42.9] Yes   • Cerebrovascular disease [I67.9] Yes   • Moderate protein-calorie malnutrition (CMS/HCC) [E44.0] Yes   • Dementia without behavioral disturbance [F03.90] Yes   • Essential hypertension [I10] Yes   • Falls frequently [R29.6] Not Applicable     Brief Hospital Course to date:  Belinda Vega is a 89 y.o. 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 Augmentin for remainder of 7 day duration when shows improvement     Hypervolemic hyponatremia  -Continue with free water diuresis, 1.5 L free water restriction and monitor with am BMP    Large Right Pleural Effusion  -CT guided Thoracentesis    Discussed case with on call - Dr. Taylor    Called Arley today - Left voicemail    NPO after breakfast - hold Lovenox today    CT Guided Thoracentesis planned.    DVT Prophylaxis:  Hold Lovenox SC for Thoracentesis    Disposition: I expect the patient to be discharged TBD    CODE STATUS:   Code Status and Medical Interventions:   Ordered at: 06/07/19 1520     Level Of Support Discussed With:    Health Care Surrogate     Code Status:    No CPR     Medical Interventions (Level of Support Prior to Arrest):    Comfort Measures         Electronically signed by José Johnson MD, 06/11/19, 10:23 AM.    "

## 2019-06-11 NOTE — NURSING NOTE
Patient placed on cardiac monitors, vital signs stable. Images taken and reviewed by Dr. Taylor. A total volume of 1100 mL serosanguinous fluid was removed from the right pleural space with samples sent to lab for testing. Patient tolerated well, vitals remained stable throughout procedure. Report called to 5H and patient returned to unit via stretcher.

## 2019-06-11 NOTE — PLAN OF CARE
Problem: Patient Care Overview  Goal: Plan of Care Review  Outcome: Ongoing (interventions implemented as appropriate)   06/11/19 1612   Coping/Psychosocial   Plan of Care Reviewed With patient;son   Plan of Care Review   Progress improving   OTHER   Outcome Summary Pts mentation improving and thoracentesis performed today removed 1100ml fluid and pt breathing much better, VSS, son at bedside, will continue to monitor.       Problem: Fall Risk (Adult)  Goal: Absence of Fall  Outcome: Ongoing (interventions implemented as appropriate)   06/11/19 1612   Fall Risk (Adult)   Absence of Fall making progress toward outcome       Problem: Skin Injury Risk (Adult)  Goal: Skin Health and Integrity  Outcome: Ongoing (interventions implemented as appropriate)   06/11/19 1612   Skin Injury Risk (Adult)   Skin Health and Integrity making progress toward outcome       Problem: Pain, Chronic (Adult)  Goal: Acceptable Pain/Comfort Level and Functional Ability  Outcome: Ongoing (interventions implemented as appropriate)   06/11/19 1612   Pain, Chronic (Adult)   Acceptable Pain/Comfort Level and Functional Ability making progress toward outcome       Problem: Palliative Care (Adult)  Goal: Maximized Comfort  Outcome: Ongoing (interventions implemented as appropriate)   06/11/19 1612   Palliative Care (Adult)   Maximized Comfort making progress toward outcome     Goal: Enhanced Quality of Life  Outcome: Ongoing (interventions implemented as appropriate)   06/11/19 1612   Palliative Care (Adult)   Enhanced Quality of Life making progress toward outcome

## 2019-06-11 NOTE — THERAPY EVALUATION
Acute Care - Physical Therapy Initial Evaluation  [facility]     Patient Name: Belinda Vega  : 1928  MRN: [id number]  Today's Date: 2019   Onset of Illness/Injury or Date of Surgery: 19  Date of Referral to PT: 06/10/19  Referring Physician: José Johnson MD      Admit Date: 2019    Visit Dx:     ICD-10-CM ICD-9-CM   1. Congestive heart failure, unspecified HF chronicity, unspecified heart failure type (CMS/HCC) I50.9 428.0   2. Lung mass R91.8 786.6   3. Pneumonia of right middle lobe due to infectious organism (CMS/HCC) J18.1 486   4. Pleural effusion J90 511.9   5. Hypoxia R09.02 799.02   6. Impaired functional mobility, balance, gait, and endurance Z74.09 V49.89     Patient Active Problem List   Diagnosis   • Dizziness   • Lightheadedness   • Falls frequently   • Dehydration   • Lichen planus   • Essential hypertension   • Anxiety disorder   • Dementia without behavioral disturbance   • Moderate protein-calorie malnutrition (CMS/HCC)   • Traumatic hematoma of forehead   • Heart murmur   • Cardiomyopathy (CMS/HCC)   • Cerebrovascular disease   • Impaired functional mobility, balance, gait, and endurance   • Age-related physical debility   • Acute on chronic combined systolic and diastolic CHF (congestive heart failure) (CMS/HCC)   • Lung mass   • Acute respiratory failure with hypoxia (CMS/HCC)   • Postobstructive pneumonia   • Hyponatremia   • Congestive heart failure (CMS/HCC)     Past Medical History:   Diagnosis Date   • Cardiomyopathy (CMS/HCC)    • Dementia    • GERD (gastroesophageal reflux disease)    • Hypertension    • Insomnia    • Stroke (CMS/HCC)      Past Surgical History:   Procedure Laterality Date   • CHOLECYSTECTOMY          PT ASSESSMENT (last 12 hours)      Physical Therapy Evaluation     Row Name 19 1125          PT Evaluation Time/Intention    Subjective Information  complains of;weakness  -LM     Document Type  evaluation  -LM     Mode of Treatment  individual  therapy;physical therapy  -LM     Patient Effort  good  -LM     Symptoms Noted During/After Treatment  fatigue  -LM     Row Name 06/11/19 1125          General Information    Patient Profile Reviewed?  yes  -LM     Onset of Illness/Injury or Date of Surgery  06/07/19  -     Referring Physician  José Johnson MD  -LM     Patient Observations  alert;cooperative;agree to therapy  -LM     Patient/Family Observations  Neighbor present throughout eval.  -LM     General Observations of Patient  Pt lying in bed.  IV intact.  Pleasant and agreeable to PT eval.  -LM     Prior Level of Function  independent:;all household mobility;gait;ADL's  -LM     Equipment Currently Used at Home  rollator;shower chair;grab bar GB in shower and by toilet  -LM     Pertinent History of Current Functional Problem  Presents from [facility] hypoxic and with BLE edema.  Dx: Acute Hypoxic Respiratory Failure; Pneumonia; Acute on Chronic CHF; Large L upper lobe mass likely malignancy  -LM     Existing Precautions/Restrictions  fall  -LM     Risks Reviewed  patient:;LOB;increased discomfort;change in vital signs  -LM     Benefits Reviewed  patient:;improve function;increase independence;increase strength;increase balance  -LM     Barriers to Rehab  none identified  -LM     Row Name 06/11/19 1125          Relationship/Environment    Lives With  facility resident  -LM     Name(s) of Who Lives With Patient  [facility]  -     Row Name 06/11/19 1125          Resource/Environmental Concerns    Current Living Arrangements  home/apartment/condo  -     Row Name 06/11/19 1125          Cognitive Assessment/Interventions    Additional Documentation  Cognitive Assessment/Intervention (Group)  -LM     Row Name 06/11/19 1125          Cognitive Assessment/Intervention- PT/OT    Affect/Mental Status (Cognitive)  WFL  -     Orientation Status (Cognition)  oriented to;person;verbal cues/prompts needed for orientation;place;time Able to get place and time  with cues  -LM     Follows Commands (Cognition)  follows one step commands;75-90% accuracy;verbal cues/prompting required  -LM     Cognitive Function (Cognitive)  safety deficit  -LM     Safety Deficit (Cognitive)  mild deficit;safety precautions awareness;safety precautions follow-through/compliance  -LM     Personal Safety Interventions  fall prevention program maintained;gait belt;nonskid shoes/slippers when out of bed;muscle strengthening facilitated  -LM     Row Name 06/11/19 1125          Bed Mobility Assessment/Treatment    Bed Mobility Assessment/Treatment  supine-sit;sit-supine  -LM     Sit-Supine [place] (Bed Mobility)  moderate assist (50% patient effort);1 person assist  -LM     Assistive Device (Bed Mobility)  bed rails;head of bed elevated  -LM     Row Name 06/11/19 1125          Transfer Assessment/Treatment    Transfer Assessment/Treatment  sit-stand transfer;stand-sit transfer  -LM     Comment (Transfers)  Stood x 2.  First stand pt required ModAx1, second stand pt required MinAx1.  Vc's for hand placement.  -LM     Sit-Stand [place] (Transfers)  moderate assist (50% patient effort);1 person assist  -LM     Stand-Sit [place] (Transfers)  minimum assist (75% patient effort);1 person assist  -LM     Row Name 06/11/19 1125          Sit-Stand Transfer    Assistive Device (Sit-Stand Transfers)  walker, 4-wheeled  -LM     Row Name 06/11/19 1125          Stand-Sit Transfer    Assistive Device (Stand-Sit Transfers)  walker, 4-wheeled  -LM     Row Name 06/11/19 1125          Gait/Stairs Assessment/Training    Gait/Stairs Assessment/Training  gait/ambulation independence;gait/ambulation assistive device;distance ambulated  -LM     [place] Level (Gait)  moderate assist (50% patient effort);1 person assist  -LM     Assistive Device (Gait)  walker, 4-wheeled  -LM     Distance in Feet (Gait)  24 feet  -LM     Deviations/Abnormal Patterns (Gait)  michelle decreased;other (see comments) Dec step  length  -LM     Bilateral Gait Deviations  forward flexed posture;heel strike decreased;weight shift ability decreased  -LM     Comment (Gait/Stairs)  Pt with significant posterior lean and difficulty correcting with cues.  -LM     Row Name 06/11/19 1125          General ROM    GENERAL ROM COMMENTS  BLE AROM WFL with exception of active ankle DF  -LM     Row Name 06/11/19 1125          MMT (Manual Muscle Testing)    General MMT Comments  BLEs - Hip flex - 4/5; Knee flex - 4-/5; Knee ext - 4/5; Ankle DF - 2+/5  -LM     Row Name 06/11/19 1125          Sensory Assessment/Intervention    Sensory General Assessment  no sensation deficits identified BLEs  -LM     Row Name 06/11/19 1125          Vision Assessment/Intervention    Visual Impairment/Limitations  corrective lenses full time  -LM     Row Name 06/11/19 1125          Pain Assessment    Additional Documentation  Pain Scale: Numbers Pre/Post-Treatment (Group)  -LM     Row Name 06/11/19 1125          Pain Scale: Numbers Pre/Post-Treatment    Pain Scale: Numbers, Pretreatment  0/10 - no pain  -LM     Pain Scale: Numbers, Post-Treatment  0/10 - no pain  -LM     Row Name 06/11/19 1125          Plan of Care Review    Plan of Care Reviewed With  patient  -LM     Row Name 06/11/19 1125          Physical Therapy Clinical Impression    Date of Referral to PT  06/10/19  -LM     PT Diagnosis (PT Clinical Impression)  Impaired functional mobility, balance, gait, and endurance  -LM     Criteria for Skilled Interventions Met (PT Clinical Impression)  yes;treatment indicated  -LM     Rehab Potential (PT Clinical Summary)  good, to achieve stated therapy goals  -LM     Care Plan Review (PT)  evaluation/treatment results reviewed;care plan/treatment goals reviewed;risks/benefits reviewed;current/potential barriers reviewed;patient/other agree to care plan  -LM     Care Plan Review, Other Participant (PT Clinical Impression)  friend  -LM     Row Name 06/11/19 1125          Vital  Signs    Pre Systolic BP Rehab  121  -LM     Pre Treatment Diastolic BP  64  -LM     Pretreatment Heart Rate (beats/min)  64  -LM     Pre SpO2 (%)  94  -LM     O2 Delivery Pre Treatment  room air  -LM     Post SpO2 (%)  95  -LM     O2 Delivery Post Treatment  room air  -LM     Pre Patient Position  Supine  -LM     Intra Patient Position  Standing  -LM     Post Patient Position  Supine  -LM     Row Name 06/11/19 1125          Physical Therapy Goals    Bed Mobility Goal Selection (PT)  bed mobility, PT goal 1  -LM     Transfer Goal Selection (PT)  transfer, PT goal 1  -LM     Gait Training Goal Selection (PT)  gait training, PT goal 1  -LM     Row Name 06/11/19 1125          Bed Mobility Goal 1 (PT)    Activity/Assistive Device (Bed Mobility Goal 1, PT)  sit to supine/supine to sit  -LM     [place] Level/Cues Needed (Bed Mobility Goal 1, PT)  supervision required  -LM     Time Frame (Bed Mobility Goal 1, PT)  long term goal (LTG);2 weeks  -LM     Row Name 06/11/19 1125          Transfer Goal 1 (PT)    Activity/Assistive Device (Transfer Goal 1, PT)  bed-to-chair/chair-to-bed  -LM     [place] Level/Cues Needed (Transfer Goal 1, PT)  supervision required  -LM     Time Frame (Transfer Goal 1, PT)  long term goal (LTG);2 weeks  -     Row Name 06/11/19 1125          Gait Training Goal 1 (PT)    Activity/Assistive Device (Gait Training Goal 1, PT)  gait (walking locomotion);assistive device use  -LM     [place] Level (Gait Training Goal 1, PT)  contact guard assist  -LM     Distance (Gait Goal 1, PT)  100 feet  -LM     Time Frame (Gait Training Goal 1, PT)  long term goal (LTG);2 weeks  -     Row Name 06/11/19 1125          Positioning and Restraints    Pre-Treatment Position  in bed  -LM     Post Treatment Position  bed  -LM     In Bed  supine;call light within reach;encouraged to call for assist;exit alarm on;with family/caregiver;notified nsg  -     Row Name 06/11/19 1125          Living Environment     Home Accessibility  -- No concerns noted  -LM       User Key  (r) = Recorded By, (t) = Taken By, (c) = Cosigned By    Initials Name Provider Type    LM Brenna Oneal, PT Physical Therapist          PT Recommendation and Plan  Anticipated Discharge Disposition (PT): skilled nursing facility  Planned Therapy Interventions (PT Eval): balance training, bed mobility training, gait training, home exercise program, neuromuscular re-education, patient/family education, postural re-education, ROM (range of motion), strengthening, stretching, transfer training  Therapy Frequency (PT Clinical Impression): daily  Outcome Summary/Treatment Plan (PT)  Anticipated Discharge Disposition (PT): skilled nursing facility  Plan of Care Reviewed With: patient  Outcome Summary: PT evaluation completed on this date.  Pt transferred supine-->sit with Eran, stood with ModA, and ambulated 24 feet using rollator with ModAx1.  Pt demonstrates a significant posterior lean with standing/ambulation and has difficulty correcting.  Skilled PT Services warranted to improve mobility and safety.  Recommend SNF at d/c.  Outcome Measures     Row Name 06/11/19 1125             How much help from another person do you currently need...    Turning from your back to your side while in flat bed without using bedrails?  4  -LM      Moving from lying on back to sitting on the side of a flat bed without bedrails?  3  -LM      Moving to and from a bed to a chair (including a wheelchair)?  2  -LM      Standing up from a chair using your arms (e.g., wheelchair, bedside chair)?  2  -LM      Climbing 3-5 steps with a railing?  1  -LM      To walk in hospital room?  2  -LM      AM-PAC 6 Clicks Score  14  -LM         Functional Assessment    Outcome Measure Options  AM-PAC 6 Clicks Basic Mobility (PT)  -LM        User Key  (r) = Recorded By, (t) = Taken By, (c) = Cosigned By    Initials Name Provider Type    Brenna Roe, PT Physical Therapist         Time  Calculation:   PT Charges     Row Name 06/11/19 1125             Time Calculation    Start Time  1125  -LM      PT Received On  06/11/19  -RUSS      PT Goal Re-Cert Due Date  06/21/19  -LM        User Key  (r) = Recorded By, (t) = Taken By, (c) = Cosigned By    Initials Name Provider Type    LM Brenna Oneal, PT Physical Therapist        Therapy Charges for Today     Code Description Service Date Service Provider Modifiers Qty    [id number] HC PT EVAL MOD COMPLEXITY 4 6/11/2019 Brenna Oneal, PT GP 1          PT G-Codes  Outcome Measure Options: AM-PAC 6 Clicks Basic Mobility (PT)  AM-PAC 6 Clicks Score: 14      Brenna Oneal PT  6/11/2019

## 2019-06-12 NOTE — PROGRESS NOTES
Continued Stay Note  [facility]     Patient Name: Belinda Vega  MRN: [id number]  Today's Date: 6/12/2019    Admit Date: 6/7/2019    Discharge Plan     Row Name 06/12/19 1659       Plan    Plan  discharge plan    Patient/Family in Agreement with Plan  yes    Plan Comments  Received a call from [facility] and pt current with [facility] for skilled nursing. Spoke with Scott at [facility] and she will call CM back with diagnosis. Pt will need resume HH at discharge faxed to [phone] if HH needed. Per provider note and discussion in unit rounds, pt not medically ready for discharge today.  CM will cont to follow        Discharge Codes    No documentation.       Expected Discharge Date and Time     Expected Discharge Date Expected Discharge Time    Jack 15, 2019             Roxane Ramires RN

## 2019-06-12 NOTE — THERAPY TREATMENT NOTE
Acute Care - Physical Therapy Treatment Note  [facility]     Patient Name: Belinda Vega  : 1928  MRN: [id number]  Today's Date: 2019  Onset of Illness/Injury or Date of Surgery: 19  Date of Referral to PT: 06/10/19  Referring Physician: MD Alex    Admit Date: 2019    Visit Dx:    ICD-10-CM ICD-9-CM   1. Congestive heart failure, unspecified HF chronicity, unspecified heart failure type (CMS/HCC) I50.9 428.0   2. Lung mass R91.8 786.6   3. Pneumonia of right middle lobe due to infectious organism (CMS/HCC) J18.1 486   4. Pleural effusion J90 511.9   5. Hypoxia R09.02 799.02   6. Impaired functional mobility, balance, gait, and endurance Z74.09 V49.89   7. Impaired mobility and ADLs Z74.09 799.89     Patient Active Problem List   Diagnosis   • Dizziness   • Lightheadedness   • Falls frequently   • Dehydration   • Lichen planus   • Essential hypertension   • Anxiety disorder   • Dementia without behavioral disturbance   • Moderate protein-calorie malnutrition (CMS/HCC)   • Traumatic hematoma of forehead   • Heart murmur   • Cardiomyopathy (CMS/HCC)   • Cerebrovascular disease   • Impaired functional mobility, balance, gait, and endurance   • Age-related physical debility   • Acute on chronic combined systolic and diastolic CHF (congestive heart failure) (CMS/HCC)   • Lung mass   • Acute respiratory failure with hypoxia (CMS/HCC)   • Postobstructive pneumonia   • Hyponatremia   • Congestive heart failure (CMS/HCC)       Therapy Treatment    Rehabilitation Treatment Summary     Row Name 19 1504             Treatment Time/Intention    Discipline  physical therapist  -LM      Document Type  therapy note (daily note)  -LM      Subjective Information  no complaints  -LM      Mode of Treatment  physical therapy  -LM      Patient/Family Observations  Pt lying in bed.  Pleasant and agreeable to PT tx.  Niece present throughout.  -LM      Care Plan Review  care plan/treatment goals  reviewed;risks/benefits reviewed;patient/other agree to care plan  -LM      Patient Effort  good  -LM      Existing Precautions/Restrictions  fall  -LM      Recorded by [LM] Brenna Oneal, PT 06/12/19 1549      Row Name 06/12/19 1504             Vital Signs    Pre Systolic BP Rehab  -- VSS - RN cleared for tx  -LM      Pre Patient Position  Supine  -LM      Intra Patient Position  Standing  -LM      Post Patient Position  Supine  -LM      Recorded by [LM] Brenna Oneal, PT 06/12/19 1549      Row Name 06/12/19 1504             Cognitive Assessment/Intervention    Additional Documentation  Cognitive Assessment/Intervention (Group)  -LM      Recorded by [LM] Brenna Oneal, PT 06/12/19 1549      Row Name 06/12/19 1504             Cognitive Assessment/Intervention- PT/OT    Affect/Mental Status (Cognitive)  WFL  -LM      Orientation Status (Cognition)  oriented to;person;place;time  -LM      Follows Commands (Cognition)  follows one step commands;75-90% accuracy;verbal cues/prompting required [place]  -LM      Cognitive Function (Cognitive)  safety deficit  -LM      Safety Deficit (Cognitive)  mild deficit;safety precautions awareness;safety precautions follow-through/compliance  -LM      Personal Safety Interventions  fall prevention program maintained;gait belt;muscle strengthening facilitated;nonskid shoes/slippers when out of bed  -LM      Recorded by [LM] Brenna Oneal, PT 06/12/19 1549      Row Name 06/12/19 1504             Bed Mobility Assessment/Treatment    Bed Mobility Assessment/Treatment  scooting/bridging  -LM      Scooting/Bridging [place] (Bed Mobility)  moderate assist (50% patient effort);1 person assist  -LM      Supine-Sit [place] (Bed Mobility)  minimum assist (75% patient effort);1 person assist  -LM      Sit-Supine [place] (Bed Mobility)  moderate assist (50% patient effort);1 person assist  -LM2      Assistive Device (Bed Mobility)  bed rails;head of bed elevated  -LM2      Comment (Bed  Mobility)  Pt able to get trunk upright but required ModA to scoot hips forward.  -LM      Recorded by [LM] Brenna Oneal, PT 06/12/19 1550  [LM2] Brenna Oneal, PT 06/12/19 1549      Row Name 06/12/19 1504             Transfer Assessment/Treatment    Comment (Transfers)  Pt stood x 2 from EOB.  Vc's for hand placement.  Pt contines to demonstrate significant posterior lean.  Has difficulty correcting and maintaining.  -LM      Recorded by [LM] Brenna Oneal, PT 06/12/19 1549      Row Name 06/12/19 1504             Sit-Stand Transfer    Sit-Stand [place] (Transfers)  moderate assist (50% patient effort);1 person assist  -LM      Assistive Device (Sit-Stand Transfers)  walker, 4-wheeled  -LM      Recorded by [LM] Brenna Oneal, PT 06/12/19 1549      Row Name 06/12/19 1504             Stand-Sit Transfer    Stand-Sit [place] (Transfers)  minimum assist (75% patient effort);1 person assist  -LM      Assistive Device (Stand-Sit Transfers)  walker, 4-wheeled  -LM      Recorded by [LM] Brenna Oneal, PT 06/12/19 1549      Row Name 06/12/19 1507             Gait/Stairs Assessment/Training    02070 - Gait Training Minutes   15  -LM      Gait/Stairs Assessment/Training  gait/ambulation independence;gait/ambulation assistive device;distance ambulated  -LM      [place] Level (Gait)  moderate assist (50% patient effort);1 person assist  -LM      Assistive Device (Gait)  walker, 4-wheeled  -LM      Distance in Feet (Gait)  50 feet  -LM      Deviations/Abnormal Patterns (Gait)  michelle decreased;other (see comments) Dec step length  -LM      Bilateral Gait Deviations  forward flexed posture;heel strike decreased;weight shift ability decreased  -LM      Comment (Gait/Stairs)  Vc's for upright posture.  Pt continues to have significant posterior lean.  Pt also veers to the R with ambulation and requires assist with guideing walker.   -LM      Recorded by [LM] Brenna Oneal, PT 06/12/19 1549      Row Name 06/12/19 1501              Motor Skills Assessment/Interventions    Additional Documentation  Balance (Group)  -LM      Recorded by [LM] Brenna Oneal, PT 06/12/19 1549      Row Name 06/12/19 1504             Balance    Balance  static sitting balance;static standing balance;dynamic standing balance  -LM      Recorded by [LM] Brenna Oneal, PT 06/12/19 1549      Row Name 06/12/19 1504             Static Sitting Balance    Level of [place] (Unsupported Sitting, Static Balance)  supervision  -LM      Sitting Position (Unsupported Sitting, Static Balance)  sitting on edge of bed  -LM      Time Able to Maintain Position (Unsupported Sitting, Static Balance)  4 to 5 minutes  -LM      Recorded by [LM] Brenna Oneal, PT 06/12/19 1549      Row Name 06/12/19 1504             Static Standing Balance    Level of [place] (Supported Standing, Static Balance)  minimal assist, 75% patient effort  -LM      Time Able to Maintain Position (Supported Standing, Static Balance)  2 to 3 minutes  -LM      Assistive Device Utilized (Supported Standing, Static Balance)  walker, rolling  -LM      Recorded by [LM] Brenna Oneal, PT 06/12/19 1549      Row Name 06/12/19 1504             Dynamic Standing Balance    Level of [place], Reaches Outside Midline (Standing, Dynamic Balance)  moderate assist, 50 to 74% patient effort  -LM      Time Able to Maintain Position, Reaches Outside Midline (Standing, Dynamic Balance)  1 to 2 minutes  -LM      Assistive Device Utilized (Supported Standing, Dynamic Balance)  walker, rolling  -LM      Recorded by [LM] Brenna Oneal, PT 06/12/19 1549      Row Name 06/12/19 1504             Positioning and Restraints    Pre-Treatment Position  in bed  -LM      Post Treatment Position  bed  -LM      In Bed  supine;call light within reach;encouraged to call for assist;exit alarm on;with family/caregiver;notified nsg  -LM      Recorded by [LM] Brenna Oneal, PT 06/12/19 1549      Row Name 06/12/19 1504             Pain  Scale: Numbers Pre/Post-Treatment    Pain Scale: Numbers, Pretreatment  0/10 - no pain  -LM      Pain Scale: Numbers, Post-Treatment  0/10 - no pain  -LM      Recorded by [LM] Brenna Oneal, PT 06/12/19 1549      Row Name 06/12/19 1504             Plan of Care Review    Plan of Care Reviewed With  patient;other (see comments) Niece  -LM      Recorded by [LM] Brenna Oneal, PT 06/12/19 1549      Row Name 06/12/19 1504             Outcome Summary/Treatment Plan (PT)    Daily Summary of Progress (PT)  progress toward functional goals as expected  -LM      Recorded by [LM] Brenna Oneal, PT 06/12/19 1549        User Key  (r) = Recorded By, (t) = Taken By, (c) = Cosigned By    Initials Name Effective Dates Discipline    LM Brenna Oneal, PT 06/15/16 -  PT               Rehab Goal Summary     Row Name 06/12/19 1506             Occupational Therapy Goals    Bed Mobility Goal Selection (OT)  bed mobility, OT goal 1  -AC      Transfer Goal Selection (OT)  transfer, OT goal 1  -AC      Toileting Goal Selection (OT)  toileting, OT goal 1  -AC      Grooming Goal Selection (OT)  grooming, OT goal 1  -AC         Bed Mobility Goal 1 (OT)    Activity/Assistive Device (Bed Mobility Goal 1, OT)  sit to supine;supine to sit  -AC      [place] Level/Cues Needed (Bed Mobility Goal 1, OT)  minimum assist (75% or more patient effort)  -AC      Time Frame (Bed Mobility Goal 1, OT)  by discharge  -AC      Progress/Outcomes (Bed Mobility Goal 1, OT)  goal ongoing  -AC         Transfer Goal 1 (OT)    Activity/Assistive Device (Transfer Goal 1, OT)  bed-to-chair/chair-to-bed;toilet;walker, rolling  -AC      [place] Level/Cues Needed (Transfer Goal 1, OT)  minimum assist (75% or more patient effort)  -AC      Time Frame (Transfer Goal 1, OT)  by discharge  -AC      Progress/Outcome (Transfer Goal 1, OT)  goal ongoing  -AC         Toileting Goal 1 (OT)    Activity/Device (Toileting Goal 1, OT)  adjust/manage clothing;perform perineal  hygiene  -AC      [place] Level/Cues Needed (Toileting Goal 1, OT)  supervision required;set-up required  -AC      Time Frame (Toileting Goal 1, OT)  by discharge  -AC      Progress/Outcome (Toileting Goal 1, OT)  goal ongoing  -AC         Grooming Goal 1 (OT)    Activity/Device (Grooming Goal 1, OT)  hair care;oral care;wash face, hands  -AC      [place] (Grooming Goal 1, OT)  set-up required;verbal cues required  -AC      Time Frame (Grooming Goal 1, OT)  by discharge  -AC      Progress/Outcome (Grooming Goal 1, OT)  goal ongoing  -AC        User Key  (r) = Recorded By, (t) = Taken By, (c) = Cosigned By    Initials Name Provider Type Discipline    AC Verito Carrillo, OT Occupational Therapist OT              PT Recommendation and Plan  Anticipated Discharge Disposition (PT): skilled nursing facility  Planned Therapy Interventions (PT Eval): balance training, bed mobility training, gait training, home exercise program, neuromuscular re-education, patient/family education, postural re-education, ROM (range of motion), strengthening, stretching, transfer training  Therapy Frequency (PT Clinical Impression): daily  Outcome Summary/Treatment Plan (PT)  Daily Summary of Progress (PT): progress toward functional goals as expected  Anticipated Discharge Disposition (PT): skilled nursing facility  Plan of Care Reviewed With: patient, other (see comments)([place])  Outcome Summary: Pt progressing well towards skilled PT goals.  Pt stood with ModAx1 and increased gait distance to 50' using rollator with ModAx1.  Continue with skilled PT to improve mobility and safety.  Outcome Measures     Row Name 06/12/19 1506 06/12/19 1504 06/11/19 1125       How much help from another person do you currently need...    Turning from your back to your side while in flat bed without using bedrails?  --  3  -LM  4  -LM    Moving from lying on back to sitting on the side of a flat bed without bedrails?  --  3  -LM  3  -LM    Moving to  and from a bed to a chair (including a wheelchair)?  --  2  -LM  2  -LM    Standing up from a chair using your arms (e.g., wheelchair, bedside chair)?  --  2  -LM  2  -LM    Climbing 3-5 steps with a railing?  --  1  -LM  1  -LM    To walk in hospital room?  --  2  -LM  2  -LM    AM-PAC 6 Clicks Score  --  13  -LM  14  -LM       How much help from another is currently needed...    Putting on and taking off regular lower body clothing?  2  -AC  --  --    Bathing (including washing, rinsing, and drying)  2  -AC  --  --    Toileting (which includes using toilet bed pan or urinal)  2  -AC  --  --    Putting on and taking off regular upper body clothing  3  -AC  --  --    Taking care of personal grooming (such as brushing teeth)  3  -AC  --  --    Eating meals  3  -AC  --  --    Score  15  -AC  --  --       Functional Assessment    Outcome Measure Options  AM-PAC 6 Clicks Daily Activity (OT)  -AC  AM-PAC 6 Clicks Basic Mobility (PT)  -LM  AM-PAC 6 Clicks Basic Mobility (PT)  -LM      User Key  (r) = Recorded By, (t) = Taken By, (c) = Cosigned By    Initials Name Provider Type    AC Verito Carrillo, OT Occupational Therapist    LM Brenna Oneal PT Physical Therapist         Time Calculation:   PT Charges     Row Name 06/12/19 1504             Time Calculation    Start Time  1504  -LM      PT Received On  06/12/19  -LM      PT Goal Re-Cert Due Date  06/21/19  -LM         Timed Charges    46350 - Gait Training Minutes   15  -LM      62697 - PT Therapeutic Activity Minutes  8  -LM        User Key  (r) = Recorded By, (t) = Taken By, (c) = Cosigned By    Initials Name Provider Type    LM Brenna Oneal, [place] Physical Therapist        Therapy Charges for Today     Code Description Service Date Service Provider Modifiers Qty    47367108516 HC PT EVAL MOD COMPLEXITY 4 6/11/2019 Brenna Oneal, PT GP 1    28736527054 HC GAIT TRAINING EA 15 MIN 6/12/2019 Brenna Oneal, PT GP 1    91556740120 HC PT THERAPEUTIC ACT EA 15 MIN 6/12/2019  Brenna Oneal, PT GP 1          PT G-Codes  Outcome Measure Options: AM-PAC 6 Clicks Daily Activity (OT)  AM-PAC 6 Clicks Score: 13  Score: 15    Brenna Oneal, PT  6/12/2019

## 2019-06-12 NOTE — PLAN OF CARE
Problem: Patient Care Overview  Goal: Plan of Care Review  Outcome: Ongoing (interventions implemented as appropriate)   06/12/19 9769   Coping/Psychosocial   Plan of Care Reviewed With patient   Plan of Care Review   Progress no change   OTHER   Outcome Summary VSS. No c/o pain or discomfort. No shortness of air. Resting comfortably. States she is ready to go home. Will continue to monitor.

## 2019-06-12 NOTE — PROGRESS NOTES
[facility] Medicine Services  PROGRESS NOTE    Patient Name: Belinda Vega  : 1928  MRN: [id number]    Date of Admission: 2019  Length of Stay: 5  Primary Care Physician: Marlen Berman MD    Subjective   Subjective     CC:  Shortness of Breath    HPI:  Pt sitting up in bed, no family at bedside. Pt confused this am and states that she is 82 years old.     Review of Systems    Gen- No fevers, chills  CV- No chest pain, palpitations  Resp- No cough, dyspnea  GI- No N/V/D, abd pain    Otherwise ROS is negative except as mentioned in the HPI.    Objective   Objective     Vital Signs:   Temp:  [97.5 °F (36.4 °C)-100.2 °F (37.9 °C)] 97.9 °F (36.6 °C)  Heart Rate:  [64-71] 64  Resp:  [15-20] 18  BP: ()/(48-93) 140/68     Physical Exam:    Constitutional: No acute distress, awake, alert  HENT: NCAT, mucous membranes moist  Respiratory: poor inspiratory effort, diminished breath sounds bilaterally  Cardiovascular: RRR, s1 and s2  Gastrointestinal: Positive bowel sounds, soft, nontender, nondistended  Musculoskeletal: No bilateral ankle edema  Psychiatric: Appropriate affect, cooperative  Neurologic: AOx1, generalized weakness  Skin: No rashes    Results Reviewed:  I have personally reviewed current lab, radiology, and data and agree.    Results from last 7 days   Lab Units 06/10/19  0415 19  0430 19  1154   WBC 10*3/mm3 9.09 8.63 9.88   HEMOGLOBIN g/dL 13.6 12.2 12.8   HEMATOCRIT % 41.8 36.0 38.2   PLATELETS 10*3/mm3 298 294 299   INR  1.06 1.10  --      Results from last 7 days   Lab Units 06/10/19  0415 19  2204 19  0430 19  0513 19  1154   SODIUM mmol/L 134*  --  133* 131* 126*   POTASSIUM mmol/L 4.9 4.4 2.8* 3.5 4.2   CHLORIDE mmol/L 90*  --  90* 90* 90*   CO2 mmol/L 37.0*  --  32.0* 28.0 24.0   BUN mg/dL 13  --  11 13 13   CREATININE mg/dL 0.66  --  0.50* 0.60 0.50*   GLUCOSE mg/dL 92  --  88 98 96   CALCIUM mg/dL 9.4  --  8.3 8.4 8.8    ALT (SGPT) U/L 8  --  8  --  8   AST (SGOT) U/L 14  --  15  --  14   TROPONIN T ng/mL  --   --   --   --  <0.010   PROBNP pg/mL 5,443.0*  --   --   --  12,922.0*     Estimated Creatinine Clearance: 30.7 mL/min (by C-G formula based on SCr of 0.66 mg/dL).    Microbiology Results Abnormal     Procedure Component Value - Date/Time    Body Fluid Culture - Body Fluid, Pleural Cavity [[id number]] Collected:  06/11/19 1636    Lab Status:  Preliminary result Specimen:  Body Fluid from Pleural Cavity Updated:  06/11/19 2003     Gram Stain Moderate (3+) WBCs seen      No organisms seen    Blood Culture - Blood, Arm, Right [[id number]] Collected:  06/07/19 1400    Lab Status:  Preliminary result Specimen:  Blood from Arm, Right Updated:  06/11/19 1431     Blood Culture No growth at 4 days    Blood Culture - Blood, Arm, Left [[id number]]  (Abnormal) Collected:  06/07/19 1355    Lab Status:  Final result Specimen:  Blood from Arm, Left Updated:  06/11/19 1029     Blood Culture Staphylococcus, coagulase negative     Comment: Probable contaminant requires clinical correlation, susceptibility not performed unless requested by physician.          Isolated from Aerobic Bottle     Gram Stain Aerobic Bottle Gram positive cocci in groups    Blood Culture ID, PCR - Blood, Arm, Left [[id number]]  (Abnormal) Collected:  06/07/19 1355    Lab Status:  Final result Specimen:  Blood from Arm, Left Updated:  06/10/19 1335     BCID, PCR Staphylococcus spp, not aureus. Identification by BCID PCR.        Imaging Results (last 24 hours)     Procedure Component Value Units Date/Time    CT Guided Thoracentesis [[id number]] Collected:  06/11/19 1630     Updated:  06/12/19 0840    Narrative:       EXAMINATION: CT-GUIDED RIGHT THORACENTESIS-06/11/2019:     HISTORY: Shortness of breath, pleural effusion.     The radiation dose reduction device was turned on as low as reasonably  achievable for each scan per ALARA protocol.     FINDINGS: Using CT guidance,  local anesthesia and sterile technique,  1,000 cc of straw-colored fluid was removed via right thoracentesis.  A  specimen was sent to the laboratory as per request. The patient  tolerated the procedure well without complication.       Impression:       Successful right thoracentesis yielding 1,000 cc of  straw-colored fluid. No complications were encountered. A specimen was  sent to the laboratory as per request.     D:  06/11/2019  E:  06/11/2019     This report was finalized on 6/12/2019 8:37 AM by Dr. Michael Taylor MD.           Results for orders placed during the hospital encounter of 03/15/19   Transthoracic Echo Complete With Contrast if Necessary Per Protocol    Narrative · Calculated EF = 32%. Estimated EF was in agreement with the calculated   EF. Estimated EF appears to be in the range of 31 - 35%. Estimated EF =   32%. Normal left ventricular cavity size and wall thickness noted. There   is left ventricular global hypokinesis noted. Left ventricular diastolic   dysfunction is noted (grade I a w/high LAP) consistent with impaired   relaxation.  · Left atrial volume is severely increased.  · Right atrial cavity size is moderately dilated  · The aortic valve is abnormal in structure. There is severe calcification   of the aortic valve.Mild aortic valve regurgitation is present. Mild   aortic valve stenosis is present. There is mild low output low gradient   aortic valve stenosis Aortic valve mean pressure gradient is 7.0 mmHg.   Aortic valve area is 1.2 cm2.  · Severe MAC is present. There is severe bileaflet mitral valve thickening   present. Mild mitral valve regurgitation is present. Moderate mitral valve   stenosis is present. The mitral valve mean gradient is 7.0 mmHg. The   mitral valve peak gradient is 16.3 mmHg. The mitral valve area by   continuity equation is 1.1 cm².  · Mild tricuspid valve regurgitation is present. Estimated right   ventricular systolic pressure from tricuspid regurgitation is  mildly   elevated (35-45 mmHg). Calculated right ventricular systolic pressure from   tricuspid regurgitation is 44 mmHg.        I have reviewed the medications:  Scheduled Meds:    citalopram 20 mg Oral Daily   enoxaparin 40 mg Subcutaneous Q24H   famotidine 20 mg Oral Daily   hydrOXYzine 10 mg Oral Nightly   losartan 50 mg Oral Daily   metoprolol succinate XL 25 mg Oral Q24H   pharmacy consult - MTM  Does not apply Daily   piperacillin-tazobactam 3.375 g Intravenous Q8H   sennosides-docusate sodium 2 tablet Oral Nightly   sodium chloride 3 mL Intravenous Q12H     Continuous Infusions:    hold 1 each     PRN Meds:.•  hold  •  acetaminophen  •  calcium carbonate  •  HYDROcodone-acetaminophen  •  LORazepam  •  melatonin  •  ondansetron  •  potassium chloride **OR** potassium chloride **OR** potassium chloride  •  potassium chloride **OR** potassium chloride **OR** potassium chloride  •  QUEtiapine  •  sodium chloride  •  sodium chloride  •  traMADol    Assessment/Plan   Assessment / Plan     Active Hospital Problems    Diagnosis POA   • **Lung mass [R91.8] Yes   • Acute on chronic combined systolic and diastolic CHF (congestive heart failure) (CMS/HCC) [I50.43] Yes   • Acute respiratory failure with hypoxia (CMS/HCC) [J96.01] Yes   • Postobstructive pneumonia [J18.9] Yes   • Hyponatremia [E87.1] Yes   • Congestive heart failure (CMS/HCC) [I50.9] Yes   • Age-related physical debility [R54] Yes   • Cardiomyopathy (CMS/HCC) [I42.9] Yes   • Cerebrovascular disease [I67.9] Yes   • Moderate protein-calorie malnutrition (CMS/HCC) [E44.0] Yes   • Dementia without behavioral disturbance [F03.90] Yes   • Essential hypertension [I10] Yes   • Falls frequently [R29.6] Not Applicable     Brief Hospital Course to date:  Belinda Vega is a 89 y.o. 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 Augmentin for remainder of 7 day duration when shows improvement     Hypervolemic hyponatremia  -Continue with free water diuresis, 1.5 L free water restriction and monitor with am BMP    Large Right Pleural Effusion  -CT guided Thoracentesis c/w exudative effusion. Pathology PENDING      DVT Prophylaxis:  Hold Lovenox SC for Thoracentesis    Disposition: I expect the patient to be discharged TBD    CODE STATUS:   Code Status and Medical Interventions:   Ordered at: 06/07/19 1520     Level Of Support Discussed With:    Health Care Surrogate     Code Status:    No CPR     Medical Interventions (Level of Support Prior to Arrest):    Comfort Measures         Electronically signed by Giovana Carvalho MD, 06/12/19, 11:55 AM.

## 2019-06-12 NOTE — PLAN OF CARE
Problem: Patient Care Overview  Goal: Plan of Care Review  Outcome: Ongoing (interventions implemented as appropriate)   06/12/19 7264   Coping/Psychosocial   Plan of Care Reviewed With patient;other (see comments)  ([place])   OTHER   Outcome Summary Pt progressing well towards skilled PT goals. Pt stood with ModAx1 and increased gait distance to 50' using rollator with ModAx1. Continue with skilled PT to improve mobility and safety.

## 2019-06-12 NOTE — PLAN OF CARE
Problem: Patient Care Overview  Goal: Plan of Care Review   06/12/19 0454   OTHER   Outcome Summary no c/o pain/sob. Appears to be resting comfortably between care.

## 2019-06-12 NOTE — THERAPY EVALUATION
Acute Care - Occupational Therapy Initial Evaluation  [facility]     Patient Name: Belinda Vega  : 1928  MRN: [id number]  Today's Date: 2019  Onset of Illness/Injury or Date of Surgery: 19  Date of Referral to OT: 06/10/19  Referring Physician: MD Alex    Admit Date: 2019       ICD-10-CM ICD-9-CM   1. Congestive heart failure, unspecified HF chronicity, unspecified heart failure type (CMS/HCC) I50.9 428.0   2. Lung mass R91.8 786.6   3. Pneumonia of right middle lobe due to infectious organism (CMS/HCC) J18.1 486   4. Pleural effusion J90 511.9   5. Hypoxia R09.02 799.02   6. Impaired functional mobility, balance, gait, and endurance Z74.09 V49.89   7. Impaired mobility and ADLs Z74.09 799.89     Patient Active Problem List   Diagnosis   • Dizziness   • Lightheadedness   • Falls frequently   • Dehydration   • Lichen planus   • Essential hypertension   • Anxiety disorder   • Dementia without behavioral disturbance   • Moderate protein-calorie malnutrition (CMS/HCC)   • Traumatic hematoma of forehead   • Heart murmur   • Cardiomyopathy (CMS/HCC)   • Cerebrovascular disease   • Impaired functional mobility, balance, gait, and endurance   • Age-related physical debility   • Acute on chronic combined systolic and diastolic CHF (congestive heart failure) (CMS/HCC)   • Lung mass   • Acute respiratory failure with hypoxia (CMS/HCC)   • Postobstructive pneumonia   • Hyponatremia   • Congestive heart failure (CMS/HCC)     Past Medical History:   Diagnosis Date   • Cardiomyopathy (CMS/HCC)    • Dementia    • GERD (gastroesophageal reflux disease)    • Hypertension    • Insomnia    • Stroke (CMS/HCC)      Past Surgical History:   Procedure Laterality Date   • CHOLECYSTECTOMY            OT ASSESSMENT FLOWSHEET (last 12 hours)      Occupational Therapy Evaluation     Row Name 19 1506                   OT Evaluation Time/Intention    Subjective Information  no complaints  -AC        Document Type   "evaluation  -        Mode of Treatment  occupational therapy  -        Patient Effort  good  -AC        Symptoms Noted During/After Treatment  fatigue  -           General Information    Patient Profile Reviewed?  yes  -        Onset of Illness/Injury or Date of Surgery  06/07/19  -        Referring Physician  MD Alex  -        Patient Observations  alert;cooperative;agree to therapy  -        Patient/Family Observations  Niece present  -        General Observations of Patient  Pt received in bed  -        Prior Level of Function  independent:;all household mobility;feeding;grooming;mod assist:;dressing;bathing  -        Equipment Currently Used at Home  rollator;shower chair;grab bar GB in shower and by toilet  -        Pertinent History of Current Functional Problem  Pt admit from [facility] hypoxia, and BLE edema.  Pt  with dementia and CHRSI lung mass  -        Existing Precautions/Restrictions  fall  -        Limitations/Impairments  hearing;safety/cognitive  -AC        Risks Reviewed  patient:;LOB  -        Benefits Reviewed  patient:;improve function;increase independence;increase strength;increase balance;increase knowledge  -        Barriers to Rehab  cognitive status;previous functional deficit;hearing deficit  -           Relationship/Environment    Lives With  facility resident  -        Family Caregiver if Needed  child(johnson), adult  -           Resource/Environmental Concerns    Current Living Arrangements  independent/assisted living facility  -           Cognitive Assessment/Interventions    Additional Documentation  Cognitive Assessment/Intervention (Group)  -           Cognitive Assessment/Intervention- PT/OT    Affect/Mental Status (Cognitive)  --  -        Orientation Status (Cognition)  oriented to;person;place \"1919\" for year  -AC        Follows Commands (Cognition)  follows one step commands;75-90% accuracy;verbal cues/prompting required  -    "     Cognitive Function (Cognitive)  safety deficit  -        Safety Deficit (Cognitive)  mild deficit;at risk behavior observed;safety precautions awareness;safety precautions follow-through/compliance  -        Personal Safety Interventions  fall prevention program maintained;gait belt;nonskid shoes/slippers when out of bed  -           Bed Mobility Assessment/Treatment    Bed Mobility Assessment/Treatment  supine-sit;sit-supine  -        Supine-Sit [place] (Bed Mobility)  moderate assist (50% patient effort)  -        Sit-Supine [place] (Bed Mobility)  moderate assist (50% patient effort);1 person assist  -        Assistive Device (Bed Mobility)  bed rails;head of bed elevated  -           Functional Mobility    Functional Mobility- Ind. Level  verbal cues required;moderate assist (50% patient effort)  -        Functional Mobility- Device  rollator  -        Functional Mobility-Distance (Feet)  -- within room  -        Functional Mobility- Comment  asssit to guide shaquille as pt pushes right  -           Transfer Assessment/Treatment    Transfer Assessment/Treatment  sit-stand transfer;stand-sit transfer;toilet transfer  -        Comment (Transfers)  posterior lean  -AC           Sit-Stand Transfer    Sit-Stand [place] (Transfers)  moderate assist (50% patient effort);1 person assist  -AC        Assistive Device (Sit-Stand Transfers)  walker, 4-wheeled  -AC           Stand-Sit Transfer    Stand-Sit [place] (Transfers)  minimum assist (75% patient effort);1 person assist  -AC        Assistive Device (Stand-Sit Transfers)  walker, 4-wheeled  -AC           Toilet Transfer    Type (Toilet Transfer)  sit-stand;stand-sit  -AC        [place] Level (Toilet Transfer)  moderate assist (50% patient effort)  -        Assistive Device (Toilet Transfer)  commode, bedside without drop arms;walker, 4-wheeled  -AC           ADL Assessment/Intervention    49654 - OT Self Care/Mgmt  Minutes  4  -AC        BADL Assessment/Intervention  lower body dressing;toileting  -           Lower Body Dressing Assessment/Training    Lower Body Dressing [place] Level  don;socks;maximum assist (25% patient effort)  -        Lower Body Dressing Position  unsupported sitting  -AC        Comment (Lower Body Dressing)  pt attempted to cross legs to attempt, and stated she has not been able to don socks for a couple of months  -           Toileting Assessment/Training    [place] Level (Toileting)  adjust/manage clothing;perform perineal hygiene  -        Assistive Devices (Toileting)  commode, bedside without drop arms  -        Toileting Position  unsupported sitting  -AC        Comment (Toileting)  min A hygiene, mod A to manage gown  -AC           BADL Safety/Performance    Impairments, BADL Safety/Performance  balance;cognition;strength;trunk/postural control;endurance/activity tolerance  -           General ROM    GENERAL ROM COMMENTS  B AROM shld flex limited 50%, otherwise WFL  -AC           MMT (Manual Muscle Testing)    General MMT Comments  B shld 2+/5,  otherwise LUE 3+/5,  RUE 4-/5  -           Sensory Assessment/Intervention    Sensory General Assessment  no sensation deficits identified BUEs  -        Additional Documentation  Vision Assessment/Intervention (Group)  -           Vision Assessment/Intervention    Visual Impairment/Limitations  corrective lenses full time  -           Positioning and Restraints    Pre-Treatment Position  in bed  -        Post Treatment Position  bed  -AC        In Bed  fowlers;call light within reach;encouraged to call for assist;exit alarm on;with family/caregiver  -           Pain Scale: Numbers Pre/Post-Treatment    Pain Scale: Numbers, Pretreatment  0/10 - no pain  -        Pain Scale: Numbers, Post-Treatment  0/10 - no pain  -           Clinical Impression (OT)    Date of Referral to OT  06/10/19  -        OT Diagnosis  ADL  decline  -AC        Patient/Family Goals Statement (OT Eval)  increase ADL independence  -AC        Criteria for Skilled Therapeutic Interventions Met (OT Eval)  yes;treatment indicated  -AC        Rehab Potential (OT Eval)  good, to achieve stated therapy goals  -AC        Therapy Frequency (OT Eval)  daily  -AC        Care Plan Review (OT)  evaluation/treatment results reviewed  -AC        Anticipated Discharge Disposition (OT)  skilled nursing facility  -AC           Vital Signs    Pre Systolic BP Rehab  140  -AC        Pre Treatment Diastolic BP  68  -AC        Pretreatment Heart Rate (beats/min)  64  -AC        Posttreatment Heart Rate (beats/min)  65  -AC        Pre SpO2 (%)  94  -AC        O2 Delivery Pre Treatment  room air  -AC        Post SpO2 (%)  95  -AC        O2 Delivery Post Treatment  room air  -AC        Pre Patient Position  Supine  -AC        Intra Patient Position  Standing  -AC        Post Patient Position  Supine  -AC           Planned OT Interventions    Planned Therapy Interventions (OT Eval)  BADL retraining;functional balance retraining;occupation/activity based interventions;strengthening exercise;transfer/mobility retraining  -AC           OT Goals    Bed Mobility Goal Selection (OT)  bed mobility, OT goal 1  -AC        Transfer Goal Selection (OT)  transfer, OT goal 1  -AC        Toileting Goal Selection (OT)  toileting, OT goal 1  -AC        Grooming Goal Selection (OT)  grooming, OT goal 1  -AC        Additional Documentation  Grooming Goal Selection (OT) (Row)  -AC           Bed Mobility Goal 1 (OT)    Activity/Assistive Device (Bed Mobility Goal 1, OT)  sit to supine;supine to sit  -AC        [place] Level/Cues Needed (Bed Mobility Goal 1, OT)  minimum assist (75% or more patient effort)  -AC        Time Frame (Bed Mobility Goal 1, OT)  by discharge  -AC        Progress/Outcomes (Bed Mobility Goal 1, OT)  goal ongoing  -AC           Transfer Goal 1 (OT)    Activity/Assistive  Device (Transfer Goal 1, OT)  bed-to-chair/chair-to-bed;toilet;walker, rolling  -AC        [place] Level/Cues Needed (Transfer Goal 1, OT)  minimum assist (75% or more patient effort)  -AC        Time Frame (Transfer Goal 1, OT)  by discharge  -AC        Progress/Outcome (Transfer Goal 1, OT)  goal ongoing  -AC           Toileting Goal 1 (OT)    Activity/Device (Toileting Goal 1, OT)  adjust/manage clothing;perform perineal hygiene  -AC        [place] Level/Cues Needed (Toileting Goal 1, OT)  supervision required;set-up required  -AC        Time Frame (Toileting Goal 1, OT)  by discharge  -AC        Progress/Outcome (Toileting Goal 1, OT)  goal ongoing  -AC           Grooming Goal 1 (OT)    Activity/Device (Grooming Goal 1, OT)  hair care;oral care;wash face, hands  -AC        [place] (Grooming Goal 1, OT)  set-up required;verbal cues required  -AC        Time Frame (Grooming Goal 1, OT)  by discharge  -AC        Progress/Outcome (Grooming Goal 1, OT)  goal ongoing  -AC           Living Environment    Home Accessibility  wheelchair accessible  -AC          User Key  (r) = Recorded By, (t) = Taken By, (c) = Cosigned By    Initials Name Effective Dates     Verito Carrillo OT 06/23/15 -          Occupational Therapy Education     Title: PT OT SLP Therapies (In Progress)     Topic: Occupational Therapy (In Progress)     Point: ADL training (Done)     Description: Instruct learner(s) on proper safety adaptation and remediation techniques during self care or transfers.   Instruct in proper use of assistive devices.    Learning Progress Summary           Patient Acceptance, E, VU,NR by  at 6/12/2019  3:06 PM    Comment:  benefits of activity, role of OT                               User Key     Initials Effective Dates Name Provider Type Discipline     06/23/15 -  Verito Carrillo, OT Occupational Therapist OT                  OT Recommendation and Plan  Outcome Summary/Treatment Plan (OT)  Anticipated  Discharge Disposition (OT): skilled nursing facility  Planned Therapy Interventions (OT Eval): BADL retraining, functional balance retraining, occupation/activity based interventions, strengthening exercise, transfer/mobility retraining  Therapy Frequency (OT Eval): daily  Plan of Care Review  Plan of Care Reviewed With: patient  Plan of Care Reviewed With: patient  Outcome Summary: OT eval complete.  Pt presents with weakness and decreased balance limitng independence with self care.  Pt required mod A supine to sit, mod A STS w/ RW,  max a to don socks, mod a BSC transfer,  min A post toileting hygiene.  OT will follow to advance pt toward PLOF with ADLs.  Recommend SNF for rehab upon d/c.     Outcome Measures     Row Name 06/12/19 1506 06/12/19 1504 06/11/19 1125       How much help from another person do you currently need...    Turning from your back to your side while in flat bed without using bedrails?  --  3  -LM  4  -LM    Moving from lying on back to sitting on the side of a flat bed without bedrails?  --  3  -LM  3  -LM    Moving to and from a bed to a chair (including a wheelchair)?  --  2  -LM  2  -LM    Standing up from a chair using your arms (e.g., wheelchair, bedside chair)?  --  2  -LM  2  -LM    Climbing 3-5 steps with a railing?  --  1  -LM  1  -LM    To walk in hospital room?  --  2  -LM  2  -LM    AM-PAC 6 Clicks Score  --  13  -LM  14  -LM       How much help from another is currently needed...    Putting on and taking off regular lower body clothing?  2  -AC  --  --    Bathing (including washing, rinsing, and drying)  2  -AC  --  --    Toileting (which includes using toilet bed pan or urinal)  2  -AC  --  --    Putting on and taking off regular upper body clothing  3  -AC  --  --    Taking care of personal grooming (such as brushing teeth)  3  -AC  --  --    Eating meals  3  -AC  --  --    Score  15  -AC  --  --       Functional Assessment    Outcome Measure Options  AM-PAC 6 Clicks Daily  Activity (OT)  -AC  AM-PAC 6 Clicks Basic Mobility (PT)  -LM  AM-PAC 6 Clicks Basic Mobility (PT)  -LM      User Key  (r) = Recorded By, (t) = Taken By, (c) = Cosigned By    Initials Name Provider Type    Verito Nicolas, OT Occupational Therapist    Brenna Roe, SEBASTIEN Physical Therapist          Time Calculation:   Time Calculation- OT     Row Name 06/12/19 1506 06/12/19 1504          Time Calculation- OT    OT Start Time  1506  -AC  --     OT Received On  06/12/19  -  --     OT Goal Re-Cert Due Date  06/22/19  -  --        Timed Charges    23086 - Gait Training Minutes   --  15  -LM     37838 - OT Self Care/Mgmt Minutes  4  -AC  --       User Key  (r) = Recorded By, (t) = Taken By, (c) = Cosigned By    Initials Name Provider Type    Verito Nicolas, OT Occupational Therapist    Brenna Roe, SEBASTIEN Physical Therapist        Therapy Charges for Today     Code Description Service Date Service Provider Modifiers Qty    [id number]  OT EVAL MOD COMPLEXITY 4 6/12/2019 Verito Carrillo, OT GO 1               Verito Carrillo OT  6/12/2019

## 2019-06-12 NOTE — PLAN OF CARE
Problem: Patient Care Overview  Goal: Plan of Care Review  Outcome: Ongoing (interventions implemented as appropriate)   06/12/19 9747   Coping/Psychosocial   Plan of Care Reviewed With patient   Plan of Care Review   Progress (Evaluation)   OTHER   Outcome Summary OT eval complete. Pt presents with weakness and decreased balance limitng independence with self care. Pt required mod A supine to sit, mod A STS w/ RW, max a to don socks, mod a BSC transfer, min A post toileting hygiene. OT will follow to advance pt toward PLOF with ADLs. Recommend SNF for rehab upon d/c.

## 2019-06-13 NOTE — MBS/VFSS/FEES
Acute Care - Speech Language Pathology   Swallow Initial Evaluation [facility]   Clinical Swallow Evaluation +  Modified Barium Swallow Study (MBS)     Patient Name: Belinda Vega  : 1928  MRN: [id number]  Today's Date: 2019  Onset of Illness/Injury or Date of Surgery: 19     Referring Physician: MD Alex      Admit Date: 2019    Visit Dx:     ICD-10-CM ICD-9-CM   1. Congestive heart failure, unspecified HF chronicity, unspecified heart failure type (CMS/HCC) I50.9 428.0   2. Lung mass R91.8 786.6   3. Pneumonia of right middle lobe due to infectious organism (CMS/HCC) J18.1 486   4. Pleural effusion J90 511.9   5. Hypoxia R09.02 799.02   6. Impaired functional mobility, balance, gait, and endurance Z74.09 V49.89   7. Impaired mobility and ADLs Z74.09 799.89   8. Dysphagia, unspecified type R13.10 787.20     Patient Active Problem List   Diagnosis   • Dizziness   • Lightheadedness   • Falls frequently   • Dehydration   • Lichen planus   • Essential hypertension   • Anxiety disorder   • Dementia without behavioral disturbance   • Moderate protein-calorie malnutrition (CMS/HCC)   • Traumatic hematoma of forehead   • Heart murmur   • Cardiomyopathy (CMS/HCC)   • Cerebrovascular disease   • Impaired functional mobility, balance, gait, and endurance   • Age-related physical debility   • Acute on chronic combined systolic and diastolic CHF (congestive heart failure) (CMS/HCC)   • Lung mass   • Acute respiratory failure with hypoxia (CMS/HCC)   • Postobstructive pneumonia   • Hyponatremia   • Congestive heart failure (CMS/HCC)     Past Medical History:   Diagnosis Date   • Cardiomyopathy (CMS/HCC)    • Dementia    • GERD (gastroesophageal reflux disease)    • Hypertension    • Insomnia    • Stroke (CMS/HCC)      Past Surgical History:   Procedure Laterality Date   • CHOLECYSTECTOMY          SWALLOW EVALUATION (last 72 hours)      SLP Adult Swallow Evaluation     Row Name 19 1030                    Rehab Evaluation    Document Type  evaluation  -        Subjective Information  no complaints  -        Patient Observations  alert;lethargic;cooperative  -           General Information    Patient Profile Reviewed  yes  -SM        Pertinent History Of Current Problem  Large CHRIS lung mass. Plan is home though RN conveys that son in not currently pursuing full comfort measures approach. Pt has had increased confusion and lethargy over past day(s), RN now concerned for swallow safety.   -        Current Method of Nutrition  regular textures;thin liquids  -        Prior Level of Function-Communication  cognitive-linguistic impairment  -        Prior Level of Function-Swallowing  safe, efficient swallowing in all situations  -        Plans/Goals Discussed with  patient;agreed upon  -        Barriers to Rehab  cognitive status  -        Patient's Goals for Discharge  patient did not state  -           Pain Scale: Numbers Pre/Post-Treatment    Pain Scale: Numbers, Pretreatment  0/10 - no pain  -SM        Pain Scale: Numbers, Post-Treatment  0/10 - no pain  -           Oral Musculature and Cranial Nerve Assessment    Oral Motor General Assessment  generalized oral motor weakness  -           Clinical Swallow Eval    Clinical Swallow Evaluation Summary  Dysarthric/imprecise speech, new per RN. Oral holding and cues needed intermittently to swallow. Multiple swallows noted intermittently. No immediate coughing though obvious mistimed/miscorrdinated swallow sequence, at least partially 2' cognitive status/lethargy but also generalized weakness noted. Increased risk for aspiration present. Adjusting diet to Dysphagia Level 3 Puree with some soft foods and nectar-thick liquids - general aspiration precautions. MBS followed at 1300  -           MBS/VFSS    Utensils Used  spoon;cup;straw  -        Consistencies Trialed  thin liquids;pureed;regular textures  -           MBS/VFSS Interpretation     Oral Prep Phase  impaired oral phase of swallowing  -        Oral Transit Phase  impaired  -SM        Oral Residue  impaired  -SM           Oral Preparatory Phase    Oral Preparatory Phase  oral holding;prolonged manipulation  -        Oral Holding  all consistencies tested;secondary to impaired cognitive status  -        Prolonged Manipulation  regular textures;secondary to impaired cognitive status  -           Oral Transit Phase    Impaired Oral Transit Phase  tongue pumping  -        Tongue Pumping  all consistencies tested;other (see comments)  -        Oral Transit Phase, Comment  intermittently  -           Oral Residue    Impaired Oral Residue  diffuse residue throughout oral cavity;other (see comments)  -SM        Diffuse Residue throughout Oral Cavity  all consistencies tested;secondary to reduced lingual strength;secondary to reduced lingual range of motion  -        Response to Oral Residue  cleared residue;with spontaneous subsequent swallow  -           Initiation of Pharyngeal Swallow    Pharyngeal Phase  functional pharyngeal phase of swallowing  -        Pharyngeal Phase, Comment  No aspiration with any consistency. Mild base of tongue/vallecular residue which pt cleared with spontaneous subsequent swallow. Often not smoothly coordinated swallow sequence, reflective of cognitive status and general weakness, though good airway protection throughout  -           Clinical Impression    SLP Swallowing Diagnosis  mild;oral dysfunction  -        Swallow Criteria for Skilled Therapeutic Interventions Met  no problems identified which require skilled intervention  -           Recommendations    Therapy Frequency (Swallow)  evaluation only  -        SLP Diet Recommendation  soft textures;chopped;thin liquids;other (see comments) can adjust solids to pt's preference/cognitive needs  -        Recommended Precautions and Strategies  upright posture during/after eating;small  bites of food and sips of liquid  -        SLP Rec. for Method of Medication Administration  as tolerated  -          User Key  (r) = Recorded By, (t) = Taken By, (c) = Cosigned By    Initials Name Effective Dates    Orquidea Elder MS CCC-SLP 06/22/15 -           EDUCATION  The patient has been educated in the following areas:   Dysphagia (Swallowing Impairment) Modified Diet Instruction.    SLP Recommendation and Plan  SLP Swallowing Diagnosis: mild, oral dysfunction  SLP Diet Recommendation: soft textures, chopped, thin liquids, other (see comments)(can adjust solids to pt's preference/cognitive needs)  Recommended Precautions and Strategies: upright posture during/after eating, small bites of food and sips of liquid  SLP Rec. for Method of Medication Administration: as tolerated           Swallow Criteria for Skilled Therapeutic Interventions Met: no problems identified which require skilled intervention        Therapy Frequency (Swallow): evaluation only          Plan of Care Reviewed With: patient  Plan of Care Review  Plan of Care Reviewed With: patient           Time Calculation:   Time Calculation- SLP     Row Name 06/13/19 1339             Time Calculation- [facility]    SLP Start Time  1000  -      SLP Received On  06/13/19  -        User Key  (r) = Recorded By, (t) = Taken By, (c) = Cosigned By    Initials Name Provider Type    Orquidea Elder MS CCC-SLP Speech and Language Pathologist          Therapy Charges for Today     Code Description Service Date Service Provider Modifiers Qty    40231164836 HC ST EVAL ORAL PHARYNG SWALLOW 3 6/13/2019 Orquidea Nino MS CCC-SLP GN 1    91985536069 HC ST MOTION FLUORO EVAL SWALLOW 3 6/13/2019 Orquidea Nino MS CCC-SLP GN 1               Orquidea Nino MS CCC-SLP  6/13/2019

## 2019-06-13 NOTE — PROGRESS NOTES
Continued Stay Note  [facility]     Patient Name: Belinda Vega  MRN: [id number]  Today's Date: 6/13/2019    Admit Date: 6/7/2019    Discharge Plan     Row Name 06/13/19 1547       Plan    Plan  discharge plan    Patient/Family in Agreement with Plan  yes    Plan Comments  Spoke with son, Arley([phone]) on cell and plan is back to [facility] and he has arranged private sitter(s) for his mom.  Spoke with Rajwinder HERNANDEZ) at [facility], and they are able to accept pt back when medically ready for discharge and aware pt possibly ready tomorrow(Friday).  CM will need to fax discharge summary to [phone]. Nurse will need to call report to [phone] and send a copy of discharge summary with pt along with any scripts for controlled meds.  Pt's son is a retired pharmacist and asking for pt to have a prescription for 3 days worth of pain meds to cover weekend. Family will transport. CM will cont to follow.         Discharge Codes    No documentation.       Expected Discharge Date and Time     Expected Discharge Date Expected Discharge Time    Jun 14, 2019             Roxane Ramires RN

## 2019-06-13 NOTE — PLAN OF CARE
Problem: Patient Care Overview  Goal: Plan of Care Review   06/13/19 1042   OTHER   Outcome Summary no c/o pain/sob. Desat overnight requiring 2LO2 while sleeping to maintain O2 sat >90%. Appears to be resting comfortably at this time.

## 2019-06-13 NOTE — PLAN OF CARE
Problem: Patient Care Overview  Goal: Plan of Care Review  Outcome: Ongoing (interventions implemented as appropriate)   06/13/19 7242   Coping/Psychosocial   Plan of Care Reviewed With patient   Plan of Care Review   Progress no change   OTHER   Outcome Summary VSS. no c/o pain. Room air most of the day. Speech eval for difficulty swallowing this am. Plans for d/c tomorrow. Will continue to monitor.

## 2019-06-13 NOTE — PROGRESS NOTES
[facility] Medicine Services  PROGRESS NOTE    Patient Name: Belinda Vega  : 1928  MRN: [id number]    Date of Admission: 2019  Length of Stay: 6  Primary Care Physician: Marlen Berman MD    Subjective   Subjective     CC:  Shortness of Breath    HPI:  Pt sleeping, unable to wake her this am.     Review of Systems    UTO  Otherwise ROS is negative except as mentioned in the HPI.    Objective   Objective     Vital Signs:   Temp:  [98.5 °F (36.9 °C)] 98.5 °F (36.9 °C)  Heart Rate:  [66-67] 67  Resp:  [16] 16  BP: (108-134)/(67-83) 134/67     Physical Exam:    Constitutional: No acute distress, sleeping  HENT: NCAT, mucous membranes moist  Respiratory: poor inspiratory effort, diminished breath sounds bilaterally  Cardiovascular: RRR, s1 and s2  Gastrointestinal: Positive bowel sounds, soft, nontender, nondistended  Musculoskeletal: No bilateral ankle edema  Psychiatric: KARLY  Neurologic: KARLY  Skin: No rashes    Results Reviewed:  I have personally reviewed current lab, radiology, and data and agree.    Results from last 7 days   Lab Units 19  0338 06/10/19  0415 19  0430   WBC 10*3/mm3 9.10 9.09 8.63   HEMOGLOBIN g/dL 13.2 13.6 12.2   HEMATOCRIT % 40.1 41.8 36.0   PLATELETS 10*3/mm3 252 298 294   INR   --  1.06 1.10     Results from last 7 days   Lab Units 19  0338 06/10/19  0415 19  2204 19  0430  19  1154   SODIUM mmol/L 133* 134*  --  133*   < > 126*   POTASSIUM mmol/L 3.3* 4.9 4.4 2.8*   < > 4.2   CHLORIDE mmol/L 92* 90*  --  90*   < > 90*   CO2 mmol/L 31.0* 37.0*  --  32.0*   < > 24.0   BUN mg/dL 15 13  --  11   < > 13   CREATININE mg/dL 0.46* 0.66  --  0.50*   < > 0.50*   GLUCOSE mg/dL 91 92  --  88   < > 96   CALCIUM mg/dL 8.8 9.4  --  8.3   < > 8.8   ALT (SGPT) U/L  --  8  --  8  --  8   AST (SGOT) U/L  --  14  --  15  --  14   TROPONIN T ng/mL  --   --   --   --   --  <0.010   PROBNP pg/mL  --  5,443.0*  --   --   --  12,922.0*    < >  = values in this interval not displayed.     Estimated Creatinine Clearance: 30.7 mL/min (A) (by C-G formula based on SCr of 0.46 mg/dL (L)).    Microbiology Results Abnormal     Procedure Component Value - Date/Time    Body Fluid Culture - Body Fluid, Pleural Cavity [[id number]] Collected:  06/11/19 1636    Lab Status:  Preliminary result Specimen:  Body Fluid from Pleural Cavity Updated:  06/13/19 0923     Body Fluid Culture No growth at 2 days     Gram Stain Moderate (3+) WBCs seen      No organisms seen    Blood Culture - Blood, Arm, Right [[id number]] Collected:  06/07/19 1400    Lab Status:  Final result Specimen:  Blood from Arm, Right Updated:  06/12/19 1430     Blood Culture No growth at 5 days    Blood Culture - Blood, Arm, Left [[id number]]  (Abnormal) Collected:  06/07/19 1355    Lab Status:  Final result Specimen:  Blood from Arm, Left Updated:  06/11/19 1029     Blood Culture Staphylococcus, coagulase negative     Comment: Probable contaminant requires clinical correlation, susceptibility not performed unless requested by physician.          Isolated from Aerobic Bottle     Gram Stain Aerobic Bottle Gram positive cocci in groups    Blood Culture ID, PCR - Blood, Arm, Left [[id number]]  (Abnormal) Collected:  06/07/19 1355    Lab Status:  Final result Specimen:  Blood from Arm, Left Updated:  06/10/19 1335     BCID, PCR Staphylococcus spp, not aureus. Identification by BCID PCR.        Imaging Results (last 24 hours)     ** No results found for the last 24 hours. **        Results for orders placed during the hospital encounter of 03/15/19   Transthoracic Echo Complete With Contrast if Necessary Per Protocol    Narrative · Calculated EF = 32%. Estimated EF was in agreement with the calculated   EF. Estimated EF appears to be in the range of 31 - 35%. Estimated EF =   32%. Normal left ventricular cavity size and wall thickness noted. There   is left ventricular global hypokinesis noted. Left ventricular  diastolic   dysfunction is noted (grade I a w/high LAP) consistent with impaired   relaxation.  · Left atrial volume is severely increased.  · Right atrial cavity size is moderately dilated  · The aortic valve is abnormal in structure. There is severe calcification   of the aortic valve.Mild aortic valve regurgitation is present. Mild   aortic valve stenosis is present. There is mild low output low gradient   aortic valve stenosis Aortic valve mean pressure gradient is 7.0 mmHg.   Aortic valve area is 1.2 cm2.  · Severe MAC is present. There is severe bileaflet mitral valve thickening   present. Mild mitral valve regurgitation is present. Moderate mitral valve   stenosis is present. The mitral valve mean gradient is 7.0 mmHg. The   mitral valve peak gradient is 16.3 mmHg. The mitral valve area by   continuity equation is 1.1 cm².  · Mild tricuspid valve regurgitation is present. Estimated right   ventricular systolic pressure from tricuspid regurgitation is mildly   elevated (35-45 mmHg). Calculated right ventricular systolic pressure from   tricuspid regurgitation is 44 mmHg.        I have reviewed the medications:  Scheduled Meds:    amoxicillin-clavulanate 1 tablet Oral Q12H   citalopram 20 mg Oral Daily   enoxaparin 40 mg Subcutaneous Q24H   famotidine 20 mg Oral Daily   hydrOXYzine 10 mg Oral Nightly   losartan 50 mg Oral Daily   metoprolol succinate XL 25 mg Oral Q24H   pharmacy consult - MTM  Does not apply Daily   sennosides-docusate sodium 2 tablet Oral Nightly   sodium chloride 3 mL Intravenous Q12H     Continuous Infusions:    hold 1 each     PRN Meds:.•  hold  •  acetaminophen  •  calcium carbonate  •  HYDROcodone-acetaminophen  •  LORazepam  •  melatonin  •  ondansetron  •  potassium chloride **OR** potassium chloride **OR** potassium chloride  •  QUEtiapine  •  sodium chloride  •  sodium chloride  •  traMADol    Assessment/Plan   Assessment / Plan     Active Hospital Problems    Diagnosis POA   •  **Lung mass [R91.8] Yes   • Acute on chronic combined systolic and diastolic CHF (congestive heart failure) (CMS/HCC) [I50.43] Yes   • Acute respiratory failure with hypoxia (CMS/HCC) [J96.01] Yes   • Postobstructive pneumonia [J18.9] Yes   • Hyponatremia [E87.1] Yes   • Congestive heart failure (CMS/HCC) [I50.9] Yes   • Age-related physical debility [R54] Yes   • Cardiomyopathy (CMS/HCC) [I42.9] Yes   • Cerebrovascular disease [I67.9] Yes   • Moderate protein-calorie malnutrition (CMS/HCC) [E44.0] Yes   • Dementia without behavioral disturbance [F03.90] Yes   • Essential hypertension [I10] Yes   • Falls frequently [R29.6] Not Applicable     Brief Hospital Course to date:  Belinda Vega is a 89 y.o. female with PMH of HTN, dementia, CHF presented with acute hypoxic respiratory failure due to acute decompensated systolic and dHF as well as post-obstructive PNA and right pleural effusion.     Acute hypoxic respiratory failure  -improved/resolved. Likely multifactorial due to below.     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 Augmentin today for total of ten days (d7/10)     Hypervolemic hyponatremia  -improving, monitor periodically    Large Right Pleural Effusion  -CT guided Thoracentesis c/w exudative effusion. Pathology PENDING      DVT Prophylaxis:  Lovenox    Disposition: I expect the patient to be discharged to [facility] 1-2 days    CODE STATUS:   Code Status and Medical Interventions:   Ordered at: 06/07/19 1520     Level Of Support Discussed With:    Health Care Surrogate     Code Status:    No CPR     Medical Interventions (Level of Support Prior to Arrest):    Comfort Measures         Electronically signed by Giovana Fulton  MD Deven, 06/13/19, 11:07 AM.

## 2019-06-13 NOTE — PLAN OF CARE
Problem: Patient Care Overview  Goal: Plan of Care Review  Outcome: Ongoing (interventions implemented as appropriate)   06/13/19 4990   Coping/Psychosocial   Plan of Care Reviewed With patient   SLP evaluation completed. No dysphagia needs. Will sign-off. Please see note for further details and recommendations.

## 2019-06-14 PROBLEM — J96.01 ACUTE RESPIRATORY FAILURE WITH HYPOXIA (HCC): Status: RESOLVED | Noted: 2019-01-01 | Resolved: 2019-01-01

## 2019-06-14 PROBLEM — E87.1 HYPONATREMIA: Status: RESOLVED | Noted: 2019-01-01 | Resolved: 2019-01-01

## 2019-06-14 NOTE — DISCHARGE SUMMARY
[facility] Medicine Services  DISCHARGE SUMMARY    Patient Name: Belinda Vega  : 1928  MRN: [id number]    Date of Admission: 2019  Date of Discharge:  2019  Primary Care Physician: Marlen Berman MD    Consults     Date and Time Order Name Status Description    2019 1504 Inpatient Palliative Care MD Consult Completed           Hospital Course     Presenting Problem:   Congestive heart failure, unspecified HF chronicity, unspecified heart failure type (CMS/HCC) [I50.9]    Active Hospital Problems    Diagnosis  POA   • **Lung mass [R91.8]  Yes     Priority: High   • Acute on chronic combined systolic and diastolic CHF (congestive heart failure) (CMS/HCC) [I50.43]  Yes     Priority: Medium   • Postobstructive pneumonia [J18.9]  Yes     Priority: Medium   • Congestive heart failure (CMS/HCC) [I50.9]  Yes     Priority: Medium   • Cerebrovascular disease [I67.9]  Yes     Priority: Medium   • Falls frequently [R29.6]  Not Applicable     Priority: Medium   • Age-related physical debility [R54]  Yes     Priority: Low   • Cardiomyopathy (CMS/HCC) [I42.9]  Yes     Priority: Low   • Moderate protein-calorie malnutrition (CMS/HCC) [E44.0]  Yes     Priority: Low   • Dementia without behavioral disturbance [F03.90]  Yes     Priority: Low   • Essential hypertension [I10]  Yes     Priority: Low      Resolved Hospital Problems    Diagnosis Date Resolved POA   • Acute respiratory failure with hypoxia (CMS/HCC) [J96.01] 2019 Yes     Priority: High   • Hyponatremia [E87.1] 2019 Yes          Hospital Course:  Belinda Vega is a 89 y.o. female with PMH of HTN, dementia, CHF presented with acute hypoxic respiratory failure due to acute decompensated systolic and dHF as well as post-obstructive PNA and right pleural effusion. Pt was also found to have a large left upper lobe mass likely a malignancy upon admission. She was admitted to our service and Palliative medicine was  consulted.  After discussion with family, specifically her son, Arley, who is POA, comfort focused care was pursued.  Pt was initially given IV Lasix in the ED and then transitioned back to her home dose Spironolactone for her decompensated CHF.  She was Treated for her PNA with IV Zosyn initially and has since been transitioned to PO Augmentin- she will complete her 10 day course after discharge (today is day 8 of 10).  She underwent a thoracentesis for her right pleural effusion mainly for therapeutic purposes, however, pathology and cytology were sent and are currently PENDING.  I did discuss the lung mass again today with her son, Arley, and he reiterated that he would not want to pursue further diagnosis.  Plan is for her to return to her prior living situation at [facility] today.             Discharge Follow Up Recommendations for labs/diagnostics:  With PCP within one week of discharge. Of note, cytology and pathology from thoracentesis are PENDING at the time of discharge.     Day of Discharge     HPI:   Pt alert and interactive this am. Denies any needs other than wanting some help eating her breakfast.     Review of Systems  Gen- No fevers, chills  CV- No chest pain, palpitations  Resp- No cough, dyspnea  GI- No N/V/D, abd pain  Otherwise ROS is negative except as mentioned in the HPI.    Vital Signs:   Temp:  [98.7 °F (37.1 °C)-98.8 °F (37.1 °C)] 98.7 °F (37.1 °C)  Heart Rate:  [64-73] 73  Resp:  [16-18] 16  BP: ()/(55-68) 133/68     Physical Exam:  Constitutional: No acute distress, awake, interactive  HENT: NCAT, mucous membranes moist  Respiratory: poor inspiratory effort, diminished breath sounds bilaterally  Cardiovascular: RRR, normal s1 and s2  Gastrointestinal: Positive bowel sounds, soft, nontender, nondistended  Musculoskeletal: No bilateral ankle edema  Psychiatric: normal affect  Neurologic: no focal deficits, CN II-XII grossly intact, confused at times  Skin: No  william        Pertinent  and/or Most Recent Results     Results from last 7 days   Lab Units 06/13/19 2038 06/13/19 0338 06/10/19  0415 06/09/19  2204 06/09/19  0430 06/08/19  0513 06/07/19  1154   WBC 10*3/mm3  --  9.10 9.09  --  8.63  --  9.88   HEMOGLOBIN g/dL  --  13.2 13.6  --  12.2  --  12.8   HEMATOCRIT %  --  40.1 41.8  --  36.0  --  38.2   PLATELETS 10*3/mm3  --  252 298  --  294  --  299   SODIUM mmol/L  --  133* 134*  --  133* 131* 126*   POTASSIUM mmol/L 4.0 3.3* 4.9 4.4 2.8* 3.5 4.2   CHLORIDE mmol/L  --  92* 90*  --  90* 90* 90*   CO2 mmol/L  --  31.0* 37.0*  --  32.0* 28.0 24.0   BUN mg/dL  --  15 13  --  11 13 13   CREATININE mg/dL  --  0.46* 0.66  --  0.50* 0.60 0.50*   GLUCOSE mg/dL  --  91 92  --  88 98 96   CALCIUM mg/dL  --  8.8 9.4  --  8.3 8.4 8.8     Results from last 7 days   Lab Units 06/10/19  0415 06/09/19  0430 06/07/19  1154   BILIRUBIN mg/dL 0.6 0.6 0.6   ALK PHOS U/L 96 89 102   ALT (SGPT) U/L 8 8 8   AST (SGOT) U/L 14 15 14   PROTIME Seconds 13.2 13.7  --    INR  1.06 1.10  --    APTT seconds 29.2  --   --            Invalid input(s): TG, LDLCALC, LDLREALC  Results from last 7 days   Lab Units 06/10/19  0415 06/07/19  1154   PROBNP pg/mL 5,443.0* 12,922.0*   TROPONIN T ng/mL  --  <0.010       Brief Urine Lab Results  (Last result in the past 365 days)      Color   Clarity   Blood   Leuk Est   Nitrite   Protein   CREAT   Urine HCG        03/16/19 0216 Yellow Cloudy Negative Negative Negative Negative               Microbiology Results Abnormal     Procedure Component Value - Date/Time    Body Fluid Culture - Body Fluid, Pleural Cavity [[id number]] Collected:  06/11/19 1636    Lab Status:  Preliminary result Specimen:  Body Fluid from Pleural Cavity Updated:  06/13/19 0923     Body Fluid Culture No growth at 2 days     Gram Stain Moderate (3+) WBCs seen      No organisms seen    Blood Culture - Blood, Arm, Right [[id number]] Collected:  06/07/19 1400    Lab Status:  Final result  Specimen:  Blood from Arm, Right Updated:  06/12/19 1430     Blood Culture No growth at 5 days    Blood Culture - Blood, Arm, Left [[id number]]  (Abnormal) Collected:  06/07/19 1355    Lab Status:  Final result Specimen:  Blood from Arm, Left Updated:  06/11/19 1029     Blood Culture Staphylococcus, coagulase negative     Comment: Probable contaminant requires clinical correlation, susceptibility not performed unless requested by physician.          Isolated from Aerobic Bottle     Gram Stain Aerobic Bottle Gram positive cocci in groups    Blood Culture ID, PCR - Blood, Arm, Left [[id number]]  (Abnormal) Collected:  06/07/19 1355    Lab Status:  Final result Specimen:  Blood from Arm, Left Updated:  06/10/19 1335     BCID, PCR Staphylococcus spp, not aureus. Identification by BCID PCR.          Imaging Results (all)     Procedure Component Value Units Date/Time    FL Video Swallow With Speech [[id number]] Collected:  06/13/19 1331     Updated:  06/13/19 1331    Narrative:       EXAMINATION: FL VIDEO SWALLOW W SPEECH-     INDICATION: dysphagia; I50.9-Heart failure, unspecified; R91.8-Other  nonspecific abnormal finding of lung field; J18.1-Lobar pneumonia,  unspecified organism; P24-Zsjxgzf effusion, not elsewhere classified;  R09.02-Hypoxemia; Z74.09-Other reduced mobility; Z74.09-Other reduced  mobility     TECHNIQUE: 1 minute and 24 seconds of fluoroscopic time was used for  this exam. 1 associated image was saved. The patient was evaluated in  the seated lateral position while taking a variety of consistencies of  barium by mouth under the direction of speech pathology.     COMPARISON: NONE     FINDINGS: There was no penetration and no aspiration with any of the  media ingested.          Impression:       Fluoroscopy provided for a modified barium swallow. Please  see speech therapy report for full details and recommendations.           CT Guided Thoracentesis [[id number]] Collected:  06/11/19 1630     Updated:   06/12/19 0840    Narrative:       EXAMINATION: CT-GUIDED RIGHT THORACENTESIS-06/11/2019:     HISTORY: Shortness of breath, pleural effusion.     The radiation dose reduction device was turned on as low as reasonably  achievable for each scan per ALARA protocol.     FINDINGS: Using CT guidance, local anesthesia and sterile technique,  1,000 cc of straw-colored fluid was removed via right thoracentesis.  A  specimen was sent to the laboratory as per request. The patient  tolerated the procedure well without complication.       Impression:       Successful right thoracentesis yielding 1,000 cc of  straw-colored fluid. No complications were encountered. A specimen was  sent to the laboratory as per request.     D:  06/11/2019  E:  06/11/2019     This report was finalized on 6/12/2019 8:37 AM by Dr. Michael Taylor MD.       CT Chest Without Contrast [[id number]] Collected:  06/07/19 1331     Updated:  06/07/19 1423    Narrative:       EXAMINATION: CT CHEST WO CONTRAST- 06/07/2019      INDICATION: Pleural effusion, shortness of air     TECHNIQUE: Multiple axial CT imaging was obtained of the chest without  the administration of intravenous contrast.     The radiation dose reduction device was turned on for each scan per the  ALARA (As Low as Reasonably Achievable) protocol.     COMPARISON: NONE     FINDINGS: Extensive edema identified within the subcutaneous tissues.  Tiny left pleural effusion identified with moderate to large right  pleural effusion present with consolidation of the right lung. There is  a large mass identified anteriorly within the left upper lobe. The soft  tissue mass in its largest axial dimension measures 7.2 x 4.7 cm. The  cardiac chambers are enlarged. There is enlargement of the   ascending thoracic aorta. No mediastinal adenopathy or bulky hilar  adenopathy. Degenerative changes seen within the spine. Upper abdomen is  grossly unremarkable.       Impression:       Large pleural effusion identified  of the right with tiny  pleural effusion on the left with consolidation filling the right mid  and lower lung field. Large mass identified anteriorly within the left  upper lobe.     D:  06/07/2019  E:  06/07/2019     This report was finalized on 6/7/2019 2:20 PM by Dr. Lulú Desouza MD.       XR Chest 1 View [[id number]] Collected:  06/07/19 1222     Updated:  06/07/19 1423    Narrative:       EXAMINATION: XR CHEST 1 VW- 06/07/2019      INDICATION: SOA triage protocol      COMPARISON: NONE     FINDINGS: Portable chest reveals enlargement of the heart. There is a  moderate size pleural effusion on the right with ill-defined  opacification seen within the right lung base. Increased markings seen  within the left midlung. Continued follow up recommended. Increased  pulmonary vascularity bilaterally. Degenerative changes seen within the  spine.       Impression:       Heart is enlarged with moderate size pleural effusion on the  right. Increased markings seen in the left perihilar region with  increased pulmonary vascularity bilaterally.     D:  06/07/2019  E:  06/07/2019     This report was finalized on 6/7/2019 2:19 PM by Dr. Lulú Desouza MD.                       Results for orders placed during the hospital encounter of 03/15/19   Transthoracic Echo Complete With Contrast if Necessary Per Protocol    Narrative · Calculated EF = 32%. Estimated EF was in agreement with the calculated   EF. Estimated EF appears to be in the range of 31 - 35%. Estimated EF =   32%. Normal left ventricular cavity size and wall thickness noted. There   is left ventricular global hypokinesis noted. Left ventricular diastolic   dysfunction is noted (grade I a w/high LAP) consistent with impaired   relaxation.  · Left atrial volume is severely increased.  · Right atrial cavity size is moderately dilated  · The aortic valve is abnormal in structure. There is severe calcification   of the aortic valve.Mild aortic valve  regurgitation is present. Mild   aortic valve stenosis is present. There is mild low output low gradient   aortic valve stenosis Aortic valve mean pressure gradient is 7.0 mmHg.   Aortic valve area is 1.2 cm2.  · Severe MAC is present. There is severe bileaflet mitral valve thickening   present. Mild mitral valve regurgitation is present. Moderate mitral valve   stenosis is present. The mitral valve mean gradient is 7.0 mmHg. The   mitral valve peak gradient is 16.3 mmHg. The mitral valve area by   continuity equation is 1.1 cm².  · Mild tricuspid valve regurgitation is present. Estimated right   ventricular systolic pressure from tricuspid regurgitation is mildly   elevated (35-45 mmHg). Calculated right ventricular systolic pressure from   tricuspid regurgitation is 44 mmHg.           Order Current Status    Anaerobic Culture - Pleural Fluid, Pleural Cavity In process    Flow Cytometry In process    Non-gynecologic Cytology In process    Body Fluid Culture - Body Fluid, Pleural Cavity Preliminary result        Discharge Details        Discharge Medications      New Medications      Instructions Start Date   amoxicillin-clavulanate 875-125 MG per tablet  Commonly known as:  AUGMENTIN   1 tablet, Oral, Every 12 Hours Scheduled      HYDROcodone-acetaminophen 5-325 MG per tablet  Commonly known as:  NORCO   1 tablet, Oral, Every 4 Hours PRN      QUEtiapine 25 MG tablet  Commonly known as:  SEROquel   12.5 mg, Oral, Every 12 Hours PRN         Changes to Medications      Instructions Start Date   LORazepam 0.5 MG tablet  Commonly known as:  ATIVAN  What changed:    · medication strength  · how much to take  · Another medication with the same name was removed. Continue taking this medication, and follow the directions you see here.   0.5 mg, Oral, Every 6 Hours PRN         Continue These Medications      Instructions Start Date   al mag oxide-diphenhydramine-nystatin suspension  Commonly known as:  MAGIC MOUTHWASH   10  mL, Oral, 3 Times Daily      calcium carbonate 500 MG chewable tablet  Commonly known as:  TUMS   1 tablet, Oral, As Needed      citalopram 20 MG tablet  Commonly known as:  CeleXA   20 mg, Oral, Daily      famotidine 20 MG tablet  Commonly known as:  PEPCID   20 mg, Oral, Daily      hydrOXYzine 10 MG tablet  Commonly known as:  ATARAX   10 mg, Oral, Nightly      losartan 50 MG tablet  Commonly known as:  COZAAR   50 mg, Oral, Daily      melatonin 5 MG tablet tablet   5 mg, Oral, Nightly PRN      metoprolol succinate XL 25 MG 24 hr tablet  Commonly known as:  TOPROL-XL   25 mg, Oral, Every 24 Hours Scheduled      multivitamin with minerals tablet tablet   1 tablet, Oral, Daily      spironolactone 25 MG tablet  Commonly known as:  ALDACTONE   25 mg, Oral, Daily      traMADol 50 MG tablet  Commonly known as:  ULTRAM   50 mg, Oral, Every 6 Hours PRN         Stop These Medications    dexamethasone 0.5 MG/5ML solution     doxycycline 100 MG capsule  Commonly known as:  VIBRAMYCIN            No Known Allergies      Discharge Disposition:  Skilled Nursing Facility (DC - External)    Discharge Diet:  Diet Order   Procedures   • Diet Soft Texture; Chopped; Thin; Cardiac         Discharge Activity:         CODE STATUS:    Code Status and Medical Interventions:   Ordered at: 06/07/19 1520     Level Of Support Discussed With:    Health Care Surrogate     Code Status:    No CPR     Medical Interventions (Level of Support Prior to Arrest):    Comfort Measures         No future appointments.    Additional Instructions for the Follow-ups that You Need to Schedule     Discharge Follow-up with PCP   As directed       Currently Documented PCP:    Marlen Berman MD    PCP Phone Number:    [phone]     Follow Up Details:  in one week with PCP               Time Spent on Discharge:  40 minutes    Electronically signed by Giovana Carvalho MD, 06/14/19, 11:04 AM.

## 2019-06-14 NOTE — DISCHARGE PLACEMENT REQUEST
"Jessica Rosado (89 y.o. Female)     To [facility]  From [facility] at [facility]() [phone]    Date of Birth Social Security Number Address Home Phone MRN    1928  [address] [place] [address] [phone] [id number]    Orthodox Marital Status          Presbyterian        Admission Date Admission Type Admitting Provider Attending Provider Department, Room/Bed    19 Emergency Giovana Carvalho MD Howard, Gabriela Kirk, MD [address], S584/1    Discharge Date Discharge Disposition Discharge Destination         Skilled Nursing Facility (DC - External)              Attending Provider:  Giovana Carvalho MD    Allergies:  No Known Allergies    Isolation:  None   Infection:  None   Code Status:  No CPR    Ht:  147.3 cm (58\")   Wt:  42.5 kg (93 lb 9.6 oz)    Admission Cmt:  None   Principal Problem:  Lung mass [R91.8]                 Active Insurance as of 2019     Primary Coverage     Payor Plan Insurance Group Employer/Plan Group    [facility] MEDICARE REPLACEMENT [facility] [id number]     Payor Plan Address Payor Plan Phone Number Payor Plan Fax Number Effective Dates    [address] [address]   2019 - None Entered    [facility] [id number]       Subscriber Name Subscriber Birth Date Member ID       JESSICA ROSADO 1928 [id number]                 Emergency Contacts      (Rel.) Home Phone Work Phone Mobile Phone    CLARENCE ROSADO (Son) [phone] -- --    MARIE ROSADO (Son) [phone] -- --               Discharge Summary      Giovana Carvalho MD at 2019 11:04 AM              [facility]  DISCHARGE SUMMARY    Patient Name: Jessica Rosado  : 1928  MRN: [id number]    Date of Admission: 2019  Date of Discharge:  2019  Primary Care Physician: Marlen Berman MD    Consults     Date and Time Order Name Status Description    2019 1504 Inpatient Palliative Care MD Consult Completed     "       Hospital Course     Presenting Problem:   Congestive heart failure, unspecified HF chronicity, unspecified heart failure type (CMS/HCC) [I50.9]    Active Hospital Problems    Diagnosis  POA   • **Lung mass [R91.8]  Yes     Priority: High   • Acute on chronic combined systolic and diastolic CHF (congestive heart failure) (CMS/HCC) [I50.43]  Yes     Priority: Medium   • Postobstructive pneumonia [J18.9]  Yes     Priority: Medium   • Congestive heart failure (CMS/HCC) [I50.9]  Yes     Priority: Medium   • Cerebrovascular disease [I67.9]  Yes     Priority: Medium   • Falls frequently [R29.6]  Not Applicable     Priority: Medium   • Age-related physical debility [R54]  Yes     Priority: Low   • Cardiomyopathy (CMS/HCC) [I42.9]  Yes     Priority: Low   • Moderate protein-calorie malnutrition (CMS/HCC) [E44.0]  Yes     Priority: Low   • Dementia without behavioral disturbance [F03.90]  Yes     Priority: Low   • Essential hypertension [I10]  Yes     Priority: Low      Resolved Hospital Problems    Diagnosis Date Resolved POA   • Acute respiratory failure with hypoxia (CMS/HCC) [J96.01] 06/14/2019 Yes     Priority: High   • Hyponatremia [E87.1] 06/14/2019 Yes          Hospital Course:  Belinda Vega is a 89 y.o. female with PMH of HTN, dementia, CHF presented with acute hypoxic respiratory failure due to acute decompensated systolic and dHF as well as post-obstructive PNA and right pleural effusion. Pt was also found to have a large left upper lobe mass likely a malignancy upon admission. She was admitted to our service and Palliative medicine was consulted.  After discussion with family, specifically her son, Arley, who is POA, comfort focused care was pursued.  Pt was initially given IV Lasix in the ED and then transitioned back to her home dose Spironolactone for her decompensated CHF.  She was Treated for her PNA with IV Zosyn initially and has since been transitioned to PO Augmentin- she will complete her 10 day  course after discharge (today is day 8 of 10).  She underwent a thoracentesis for her right pleural effusion mainly for therapeutic purposes, however, pathology and cytology were sent and are currently PENDING.  I did discuss the lung mass again today with her son, Arley, and he reiterated that he would not want to pursue further diagnosis.  Plan is for her to return to her prior living situation at [facility] today.             Discharge Follow Up Recommendations for labs/diagnostics:  With PCP within one week of discharge. Of note, cytology and pathology from thoracentesis are PENDING at the time of discharge.     Day of Discharge     HPI:   Pt alert and interactive this am. Denies any needs other than wanting some help eating her breakfast.     Review of Systems  Gen- No fevers, chills  CV- No chest pain, palpitations  Resp- No cough, dyspnea  GI- No N/V/D, abd pain  Otherwise ROS is negative except as mentioned in the HPI.    Vital Signs:   Temp:  [98.7 °F (37.1 °C)-98.8 °F (37.1 °C)] 98.7 °F (37.1 °C)  Heart Rate:  [64-73] 73  Resp:  [16-18] 16  BP: ()/(55-68) 133/68     Physical Exam:  Constitutional: No acute distress,  awake, interactive  HENT: NCAT, mucous membranes moist  Respiratory: poor inspiratory effort, diminished breath sounds bilaterally  Cardiovascular: RRR, normal s1 and s2  Gastrointestinal: Positive bowel sounds, soft, nontender, nondistended  Musculoskeletal: No bilateral ankle edema  Psychiatric:  normal affect  Neurologic:  no focal deficits, CN II-XII grossly intact, confused at times  Skin: No rashes        Pertinent  and/or Most Recent Results     Results from last 7 days   Lab Units 06/13/19 2038 06/13/19  0338 06/10/19  0415 06/09/19  2204 06/09/19  0430 06/08/19  0513 06/07/19  1154   WBC 10*3/mm3  --  9.10 9.09  --  8.63  --  9.88   HEMOGLOBIN g/dL  --  13.2 13.6  --  12.2  --  12.8   HEMATOCRIT %  --  40.1 41.8  --  36.0  --  38.2   PLATELETS 10*3/mm3  --  252 298  --  294   --  299   SODIUM mmol/L  --  133* 134*  --  133* 131* 126*   POTASSIUM mmol/L 4.0 3.3* 4.9 4.4 2.8* 3.5 4.2   CHLORIDE mmol/L  --  92* 90*  --  90* 90* 90*   CO2 mmol/L  --  31.0* 37.0*  --  32.0* 28.0 24.0   BUN mg/dL  --  15 13  --  11 13 13   CREATININE mg/dL  --  0.46* 0.66  --  0.50* 0.60 0.50*   GLUCOSE mg/dL  --  91 92  --  88 98 96   CALCIUM mg/dL  --  8.8 9.4  --  8.3 8.4 8.8     Results from last 7 days   Lab Units 06/10/19  0415 06/09/19  0430 06/07/19  1154   BILIRUBIN mg/dL 0.6 0.6 0.6   ALK PHOS U/L 96 89 102   ALT (SGPT) U/L 8 8 8   AST (SGOT) U/L 14 15 14   PROTIME Seconds 13.2 13.7  --    INR  1.06 1.10  --    APTT seconds 29.2  --   --            Invalid input(s): TG, LDLCALC, LDLREALC  Results from last 7 days   Lab Units 06/10/19  0415 06/07/19  1154   PROBNP pg/mL 5,443.0* 12,922.0*   TROPONIN T ng/mL  --  <0.010       Brief Urine Lab Results  (Last result in the past 365 days)      Color   Clarity   Blood   Leuk Est   Nitrite   Protein   CREAT   Urine HCG        03/16/19 0216 Yellow Cloudy Negative Negative Negative Negative               Microbiology Results Abnormal     Procedure Component Value - Date/Time    Body Fluid Culture - Body Fluid, Pleural Cavity [[id number]] Collected:  06/11/19 1636    Lab Status:  Preliminary result Specimen:  Body Fluid from Pleural Cavity Updated:  06/13/19 0923     Body Fluid Culture No growth at 2 days     Gram Stain Moderate (3+) WBCs seen      No organisms seen    Blood Culture - Blood, Arm, Right [[id number]] Collected:  06/07/19 1400    Lab Status:  Final result Specimen:  Blood from Arm, Right Updated:  06/12/19 1430     Blood Culture No growth at 5 days    Blood Culture - Blood, Arm, Left [[id number]]  (Abnormal) Collected:  06/07/19 1355    Lab Status:  Final result Specimen:  Blood from Arm, Left Updated:  06/11/19 1029     Blood Culture Staphylococcus, coagulase negative     Comment: Probable contaminant requires clinical correlation, susceptibility  not performed unless requested by physician.          Isolated from Aerobic Bottle     Gram Stain Aerobic Bottle Gram positive cocci in groups    Blood Culture ID, PCR - Blood, Arm, Left [[id number]]  (Abnormal) Collected:  06/07/19 1355    Lab Status:  Final result Specimen:  Blood from Arm, Left Updated:  06/10/19 1335     BCID, PCR Staphylococcus spp, not aureus. Identification by BCID PCR.          Imaging Results (all)     Procedure Component Value Units Date/Time    FL Video Swallow With Speech [[id number]] Collected:  06/13/19 1331     Updated:  06/13/19 1331    Narrative:       EXAMINATION: FL VIDEO SWALLOW W SPEECH-     INDICATION: dysphagia; I50.9-Heart failure, unspecified; R91.8-Other  nonspecific abnormal finding of lung field; J18.1-Lobar pneumonia,  unspecified organism; M45-Qiytjtd effusion, not elsewhere classified;  R09.02-Hypoxemia; Z74.09-Other reduced mobility; Z74.09-Other reduced  mobility     TECHNIQUE: 1 minute and 24 seconds of fluoroscopic time was used for  this exam. 1 associated image was saved. The patient was evaluated in  the seated lateral position while taking a variety of consistencies of  barium by mouth under the direction of speech pathology.     COMPARISON: NONE     FINDINGS: There was no penetration and no aspiration with any of the  media ingested.          Impression:       Fluoroscopy provided for a modified barium swallow. Please  see speech therapy report for full details and recommendations.           CT Guided Thoracentesis [[id number]] Collected:  06/11/19 1630     Updated:  06/12/19 0840    Narrative:       EXAMINATION: CT-GUIDED RIGHT THORACENTESIS-06/11/2019:     HISTORY: Shortness of breath, pleural effusion.     The radiation dose reduction device was turned on as low as reasonably  achievable for each scan per ALARA protocol.     FINDINGS: Using CT guidance, local anesthesia and sterile technique,  1,000 cc of straw-colored fluid was removed via right  thoracentesis.  A  specimen was sent to the laboratory as per request. The patient  tolerated the procedure well without complication.       Impression:       Successful right thoracentesis yielding 1,000 cc of  straw-colored fluid. No complications were encountered. A specimen was  sent to the laboratory as per request.     D:  06/11/2019  E:  06/11/2019     This report was finalized on 6/12/2019 8:37 AM by Dr. Michael Taylor MD.       CT Chest Without Contrast [[id number]] Collected:  06/07/19 1331     Updated:  06/07/19 1423    Narrative:       EXAMINATION: CT CHEST WO CONTRAST- 06/07/2019      INDICATION: Pleural effusion, shortness of air     TECHNIQUE: Multiple axial CT imaging was obtained of the chest without  the administration of intravenous contrast.     The radiation dose reduction device was turned on for each scan per the  ALARA (As Low as Reasonably Achievable) protocol.     COMPARISON: NONE     FINDINGS: Extensive edema identified within the subcutaneous tissues.  Tiny left pleural effusion identified with moderate to large right  pleural effusion present with consolidation of the right lung. There is  a large mass identified anteriorly within the left upper lobe. The soft  tissue mass in its largest axial dimension measures 7.2 x 4.7 cm. The  cardiac chambers are enlarged. There is enlargement of the   ascending thoracic aorta. No mediastinal adenopathy or bulky hilar  adenopathy. Degenerative changes seen within the spine. Upper abdomen is  grossly unremarkable.       Impression:       Large pleural effusion identified of the right with tiny  pleural effusion on the left with consolidation filling the right mid  and lower lung field. Large mass identified anteriorly within the left  upper lobe.     D:  06/07/2019  E:  06/07/2019     This report was finalized on 6/7/2019 2:20 PM by Dr. Lulú Desouza MD.       XR Chest 1 View [[id number]] Collected:  06/07/19 1222     Updated:  06/07/19 1423     Narrative:       EXAMINATION: XR CHEST 1 VW- 06/07/2019      INDICATION: SOA triage protocol      COMPARISON: NONE     FINDINGS: Portable chest reveals enlargement of the heart. There is a  moderate size pleural effusion on the right with ill-defined  opacification seen within the right lung base. Increased markings seen  within the left midlung. Continued follow up recommended. Increased  pulmonary vascularity bilaterally. Degenerative changes seen within the  spine.       Impression:       Heart is enlarged with moderate size pleural effusion on the  right. Increased markings seen in the left perihilar region with  increased pulmonary vascularity bilaterally.     D:  06/07/2019  E:  06/07/2019     This report was finalized on 6/7/2019 2:19 PM by Dr. Lulú Desouza MD.                       Results for orders placed during the hospital encounter of 03/15/19   Transthoracic Echo Complete With Contrast if Necessary Per Protocol    Narrative · Calculated EF = 32%. Estimated EF was in agreement with the calculated   EF. Estimated EF appears to be in the range of 31 - 35%. Estimated EF =   32%. Normal left ventricular cavity size and wall thickness noted. There   is left ventricular global hypokinesis noted. Left ventricular diastolic   dysfunction is noted (grade I a w/high LAP) consistent with impaired   relaxation.  · Left atrial volume is severely increased.  · Right atrial cavity size is moderately dilated  · The aortic valve is abnormal in structure. There is severe calcification   of the aortic valve.Mild aortic valve regurgitation is present. Mild   aortic valve stenosis is present. There is mild low output low gradient   aortic valve stenosis Aortic valve mean pressure gradient is 7.0 mmHg.   Aortic valve area is 1.2 cm2.  · Severe MAC is present. There is severe bileaflet mitral valve thickening   present. Mild mitral valve regurgitation is present. Moderate mitral valve   stenosis is present. The mitral  valve mean gradient is 7.0 mmHg. The   mitral valve peak gradient is 16.3 mmHg. The mitral valve area by   continuity equation is 1.1 cm².  · Mild tricuspid valve regurgitation is present. Estimated right   ventricular systolic pressure from tricuspid regurgitation is mildly   elevated (35-45 mmHg). Calculated right ventricular systolic pressure from   tricuspid regurgitation is 44 mmHg.           Order Current Status    Anaerobic Culture - Pleural Fluid, Pleural Cavity In process    Flow Cytometry In process    Non-gynecologic Cytology In process    Body Fluid Culture - Body Fluid, Pleural Cavity Preliminary result        Discharge Details        Discharge Medications      New Medications      Instructions Start Date   amoxicillin-clavulanate 875-125 MG per tablet  Commonly known as:  AUGMENTIN   1 tablet, Oral, Every 12 Hours Scheduled      HYDROcodone-acetaminophen 5-325 MG per tablet  Commonly known as:  NORCO   1 tablet, Oral, Every 4 Hours PRN      QUEtiapine 25 MG tablet  Commonly known as:  SEROquel   12.5 mg, Oral, Every 12 Hours PRN         Changes to Medications      Instructions Start Date   LORazepam 0.5 MG tablet  Commonly known as:  ATIVAN  What changed:    · medication strength  · how much to take  · Another medication with the same name was removed. Continue taking this medication, and follow the directions you see here.   0.5 mg, Oral, Every 6 Hours PRN         Continue These Medications      Instructions Start Date   al mag oxide-diphenhydramine-nystatin suspension  Commonly known as:  MAGIC MOUTHWASH   10 mL, Oral, 3 Times Daily      calcium carbonate 500 MG chewable tablet  Commonly known as:  TUMS   1 tablet, Oral, As Needed      citalopram 20 MG tablet  Commonly known as:  CeleXA   20 mg, Oral, Daily      famotidine 20 MG tablet  Commonly known as:  PEPCID   20 mg, Oral, Daily      hydrOXYzine 10 MG tablet  Commonly known as:  ATARAX   10 mg, Oral, Nightly      losartan 50 MG tablet  Commonly  known as:  COZAAR   50 mg, Oral, Daily      melatonin 5 MG tablet tablet   5 mg, Oral, Nightly PRN      metoprolol succinate XL 25 MG 24 hr tablet  Commonly known as:  TOPROL-XL   25 mg, Oral, Every 24 Hours Scheduled      multivitamin with minerals tablet tablet   1 tablet, Oral, Daily      spironolactone 25 MG tablet  Commonly known as:  ALDACTONE   25 mg, Oral, Daily      traMADol 50 MG tablet  Commonly known as:  ULTRAM   50 mg, Oral, Every 6 Hours PRN         Stop These Medications    dexamethasone 0.5 MG/5ML solution     doxycycline 100 MG capsule  Commonly known as:  VIBRAMYCIN            No Known Allergies      Discharge Disposition:  Skilled Nursing Facility (DC - External)    Discharge Diet:  Diet Order   Procedures   • Diet Soft Texture; Chopped; Thin; Cardiac         Discharge Activity:         CODE STATUS:    Code Status and Medical Interventions:   Ordered at: 06/07/19 1520     Level Of Support Discussed With:    Health Care Surrogate     Code Status:    No CPR     Medical Interventions (Level of Support Prior to Arrest):    Comfort Measures         No future appointments.    Additional Instructions for the Follow-ups that You Need to Schedule     Discharge Follow-up with PCP   As directed       Currently Documented PCP:    Marlen Berman MD    PCP Phone Number:    [phone]     Follow Up Details:  in one week with PCP               Time Spent on Discharge:  40 minutes    Electronically signed by Giovana Carvalho MD, 06/14/19, 11:04 AM.      Electronically signed by Giovana Carvalho MD at 6/14/2019 11:11 AM

## 2019-06-14 NOTE — NURSING NOTE
Patient Name:  Belinda Vega  :  1928  DOS:  19    Active Hospital Problems    Diagnosis   • **Lung mass   • Acute on chronic combined systolic and diastolic CHF (congestive heart failure) (CMS/HCC)   • Postobstructive pneumonia   • Congestive heart failure (CMS/HCC)   • Age-related physical debility   • Cardiomyopathy (CMS/HCC)   • Cerebrovascular disease   • Moderate protein-calorie malnutrition (CMS/HCC)   • Dementia without behavioral disturbance   • Essential hypertension   • Falls frequently       Consult has been received.  Medical records have been reviewed. Patient with history of dementia,hypertension,cardiomyopathy/combined systolic and diastolic heart failure,stroke currently admitted with acute on chronic CHF.  Limited education provided. No family in room. Education time 5min.  Patient currently resides at SNF. It is unclear whether she will have transportation to outpatient appts. GOC are comfort measures. Left information regarding our clinic as well as HF Zones for family to review.     Echo Results:  · Calculated EF = 32%. Estimated EF was in agreement with the calculated EF. Estimated EF appears to be in the range of 31 - 35%. Estimated EF = 32%. Normal left ventricular cavity size and wall thickness noted. There is left ventricular global hypokinesis noted. Left ventricular diastolic dysfunction is noted (grade I a w/high LAP) consistent with impaired relaxation.  · Left atrial volume is severely increased.  · Right atrial cavity size is moderately dilated  · The aortic valve is abnormal in structure. There is severe calcification of the aortic valve.Mild aortic valve regurgitation is present. Mild aortic valve stenosis is present. There is mild low output low gradient aortic valve stenosis Aortic valve mean pressure gradient is 7.0 mmHg. Aortic valve area is 1.2 cm2.  · Severe MAC is present. There is severe bileaflet mitral valve thickening present. Mild mitral valve regurgitation is  present. Moderate mitral valve stenosis is present. The mitral valve mean gradient is 7.0 mmHg. The mitral valve peak gradient is 16.3 mmHg. The mitral valve area by continuity equation is 1.1 cm².  · Mild tricuspid valve regurgitation is present. Estimated right ventricular systolic pressure from tricuspid regurgitation is mildly elevated (35-45 mmHg). Calculated right ventricular systolic pressure from tricuspid regurgitation is 44 mmHg.  NYHA Class:    Heart Failure Quality Measures    ACE I, ARB, ARNI (if EF <40%)     Yes  losartan    Evidence-based Beta Blocker (EF<40%)    (Bisoprolol, Carvedilol, Metoprolol Succinate)  Yes  Metoprolol succinate    Aldosterone Antagonist (EF <40%)  If no contraindications:  Hypotension    Heart Failure Education    Signs / symptoms and Role of Heart and Valve Center and when to call    If EF < 35%  Patient is comfort measures

## 2019-06-14 NOTE — PROGRESS NOTES
Case Management Discharge Note    Final Note: Plan is back to [facility] assisted living and son will transport. Spoke with Rajwinder(director of nursing at [facility]) and they can accept pt today.  CM faxed discharge summary to [phone]. Nurse can call report to [phone] and send a copy of discharge summary with pt along with any scripts for controlled meds. Son has arranged a private sitter for his mom.      Destination      No service has been selected for the patient.      Durable Medical Equipment      No service has been selected for the patient.      Dialysis/Infusion      No service has been selected for the patient.      Home Medical Care      No service has been selected for the patient.      Therapy      No service has been selected for the patient.      Community Resources      No service has been selected for the patient.             Final Discharge Disposition Code: 01 - home or self-care

## 2019-06-14 NOTE — PLAN OF CARE
Problem: Patient Care Overview  Goal: Plan of Care Review  Outcome: Ongoing (interventions implemented as appropriate)   06/14/19 0257   Coping/Psychosocial   Plan of Care Reviewed With patient   Plan of Care Review   Progress no change       Problem: Fall Risk (Adult)  Goal: Absence of Fall  Outcome: Ongoing (interventions implemented as appropriate)   06/14/19 0257   Fall Risk (Adult)   Absence of Fall making progress toward outcome       Problem: Skin Injury Risk (Adult)  Goal: Skin Health and Integrity  Outcome: Ongoing (interventions implemented as appropriate)   06/14/19 0257   Skin Injury Risk (Adult)   Skin Health and Integrity making progress toward outcome       Problem: Pain, Chronic (Adult)  Goal: Acceptable Pain/Comfort Level and Functional Ability  Outcome: Ongoing (interventions implemented as appropriate)   06/14/19 0257   Pain, Chronic (Adult)   Acceptable Pain/Comfort Level and Functional Ability making progress toward outcome       Problem: Palliative Care (Adult)  Goal: Maximized Comfort  Outcome: Ongoing (interventions implemented as appropriate)   06/14/19 0257   Palliative Care (Adult)   Maximized Comfort making progress toward outcome     Goal: Enhanced Quality of Life  Outcome: Ongoing (interventions implemented as appropriate)   06/14/19 0257   Palliative Care (Adult)   Enhanced Quality of Life making progress toward outcome

## 2019-11-27 NOTE — PLAN OF CARE
Problem: Patient Care Overview  Goal: Plan of Care Review  Outcome: Ongoing (interventions implemented as appropriate)   03/16/19 1500   Coping/Psychosocial   Plan of Care Reviewed With patient;family   OTHER   Outcome Summary Pt is a 89 y.o. female presenting to PT with impaired mobility, generalized weakness, and decreased activity tolerance after being admitted to [facility] for dizziness. Pt was able to perform bed mobility, transfers, and ambulate 60 ft with RWx and Minx1. She demonstrates no overt unsteadiness/LOB during mobility. She also denies any lightheadedness/dizziness throughout. She will benefit from skilled PT to address her impairments in order to improve her independence with functional mobility and to reduce her falls risk as she has a history of frequent falling at home. PT recommends d/c to SNF at this time pending progress.           General

## 2019-11-29 PROBLEM — N17.9 AKI (ACUTE KIDNEY INJURY) (HCC): Status: ACTIVE | Noted: 2019-01-01

## 2019-11-29 PROBLEM — D64.9 ANEMIA: Status: ACTIVE | Noted: 2019-01-01

## 2019-11-29 PROBLEM — J90 PLEURAL EFFUSION: Status: ACTIVE | Noted: 2019-01-01

## 2019-11-29 PROBLEM — R56.9 SEIZURE (HCC): Status: ACTIVE | Noted: 2019-01-01

## 2019-11-29 PROBLEM — D72.829 LEUKOCYTOSIS: Status: ACTIVE | Noted: 2019-01-01

## 2019-11-29 PROBLEM — R77.8 ELEVATED TROPONIN: Status: ACTIVE | Noted: 2019-01-01

## 2019-11-29 PROBLEM — Z51.5 END OF LIFE CARE: Status: ACTIVE | Noted: 2019-01-01

## 2020-09-02 NOTE — PLAN OF CARE
Problem: Patient Care Overview  Goal: Plan of Care Review  Outcome: Ongoing (interventions implemented as appropriate)   03/20/19 0426   Coping/Psychosocial   Plan of Care Reviewed With patient   Plan of Care Review   Progress improving   OTHER   Outcome Summary No significant change this shift, pt slept on/off this shift, fluids encouraged, ativan administerd at bedtime for anxiety       Problem: Fall Risk (Adult)  Goal: Absence of Fall  Outcome: Ongoing (interventions implemented as appropriate)      Problem: Skin Injury Risk (Adult)  Goal: Skin Health and Integrity  Outcome: Ongoing (interventions implemented as appropriate)      Problem: Nutrition, Imbalanced: Inadequate Oral Intake (Adult)  Goal: Improved Oral Intake  Outcome: Ongoing (interventions implemented as appropriate)         8

## 2023-01-01 NOTE — PLAN OF CARE
Problem: Patient Care Overview  Goal: Plan of Care Review   19 1422 19 2105 19 0500   Coping/Psychosocial   Plan of Care Reviewed With --  patient --    Plan of Care Review   Progress improving --  --    OTHER   Outcome Summary --  --  Patient resting well this shift. No pain noted. VSS. Some anxiety noted at beginning of shift, but resolved with discussion. Continue to monitor labs and vitals.       Problem: Fall Risk (Adult)  Intervention: Reduce Risk/Promote Restraint Free Environment   19 0300 19 0400   Safety Management   Environmental Safety Modification --  assistive device/personal items within reach;clutter free environment maintained;lighting adjusted;mobility aid in reach;room near unit station;room organization consistent   Safety Promotion/Fall Prevention --  fall prevention program maintained;safety round/check completed   Prevent Lucas Drop/Fall   Safety/Security Measures bed alarm set --      Intervention: Review Medications/Identify Contributors to Fall Risk   19 180   Safety Management   Medication Review/Management medications reviewed       Goal: Absence of Fall   19 180   Fall Risk (Adult)   Absence of Fall making progress toward outcome       Problem: Skin Injury Risk (Adult)  Goal: Skin Health and Integrity   19 180   Skin Injury Risk (Adult)   Skin Health and Integrity making progress toward outcome       Problem: Nutrition, Imbalanced: Inadequate Oral Intake (Adult)  Intervention: Explore Factors Potentially Impacting Oral Intake   03/15/19 1631 19 0904   Coping/Psychosocial Interventions   Supportive Measures --  active listening utilized   Psychosocial Support   Family/Support System Care self-care encouraged --        Goal: Improved Oral Intake   19 180   Nutrition, Imbalanced: Inadequate Oral Intake (Adult)   Improved Oral Intake making progress toward outcome     Goal: Prevent Further Weight Loss   19 0500    Nutrition, Imbalanced: Inadequate Oral Intake (Adult)   Prevent Further Weight Loss making progress toward outcome          show